# Patient Record
Sex: FEMALE | Race: WHITE | Employment: PART TIME | URBAN - METROPOLITAN AREA
[De-identification: names, ages, dates, MRNs, and addresses within clinical notes are randomized per-mention and may not be internally consistent; named-entity substitution may affect disease eponyms.]

---

## 2017-01-12 ENCOUNTER — TELEPHONE (OUTPATIENT)
Dept: FAMILY MEDICINE CLINIC | Age: 37
End: 2017-01-12

## 2017-05-22 ENCOUNTER — TELEPHONE (OUTPATIENT)
Dept: FAMILY MEDICINE CLINIC | Age: 37
End: 2017-05-22

## 2017-05-31 ENCOUNTER — OFFICE VISIT (OUTPATIENT)
Dept: FAMILY MEDICINE CLINIC | Age: 37
End: 2017-05-31

## 2017-05-31 VITALS
SYSTOLIC BLOOD PRESSURE: 124 MMHG | OXYGEN SATURATION: 98 % | WEIGHT: 293 LBS | HEART RATE: 72 BPM | BODY MASS INDEX: 53.63 KG/M2 | DIASTOLIC BLOOD PRESSURE: 82 MMHG

## 2017-05-31 DIAGNOSIS — E66.01 OBESITY, MORBID, BMI 50 OR HIGHER (HCC): ICD-10-CM

## 2017-05-31 DIAGNOSIS — M25.552 LEFT HIP PAIN: Primary | ICD-10-CM

## 2017-05-31 PROCEDURE — 96160 PT-FOCUSED HLTH RISK ASSMT: CPT | Performed by: FAMILY MEDICINE

## 2017-05-31 PROCEDURE — 96372 THER/PROPH/DIAG INJ SC/IM: CPT | Performed by: FAMILY MEDICINE

## 2017-05-31 PROCEDURE — 99213 OFFICE O/P EST LOW 20 MIN: CPT | Performed by: FAMILY MEDICINE

## 2017-05-31 RX ORDER — ACETAMINOPHEN AND CODEINE PHOSPHATE 300; 30 MG/1; MG/1
1 TABLET ORAL EVERY 6 HOURS PRN
Qty: 30 TABLET | Refills: 0 | Status: SHIPPED | OUTPATIENT
Start: 2017-05-31 | End: 2017-10-16

## 2017-05-31 RX ORDER — PHENTERMINE HYDROCHLORIDE 37.5 MG/1
37.5 TABLET ORAL
Qty: 30 TABLET | Refills: 0 | Status: SHIPPED | OUTPATIENT
Start: 2017-05-31 | End: 2017-06-30

## 2017-05-31 RX ORDER — METHOCARBAMOL 750 MG/1
750 TABLET, FILM COATED ORAL 2 TIMES DAILY
Qty: 30 TABLET | Refills: 0 | Status: SHIPPED | OUTPATIENT
Start: 2017-05-31 | End: 2017-06-15

## 2017-05-31 ASSESSMENT — PATIENT HEALTH QUESTIONNAIRE - PHQ9
SUM OF ALL RESPONSES TO PHQ9 QUESTIONS 1 & 2: 2
8. MOVING OR SPEAKING SO SLOWLY THAT OTHER PEOPLE COULD HAVE NOTICED. OR THE OPPOSITE, BEING SO FIGETY OR RESTLESS THAT YOU HAVE BEEN MOVING AROUND A LOT MORE THAN USUAL: 0
4. FEELING TIRED OR HAVING LITTLE ENERGY: 1
2. FEELING DOWN, DEPRESSED OR HOPELESS: 1
5. POOR APPETITE OR OVEREATING: 1
3. TROUBLE FALLING OR STAYING ASLEEP: 0
6. FEELING BAD ABOUT YOURSELF - OR THAT YOU ARE A FAILURE OR HAVE LET YOURSELF OR YOUR FAMILY DOWN: 1
9. THOUGHTS THAT YOU WOULD BE BETTER OFF DEAD, OR OF HURTING YOURSELF: 0
SUM OF ALL RESPONSES TO PHQ QUESTIONS 1-9: 5
1. LITTLE INTEREST OR PLEASURE IN DOING THINGS: 1
10. IF YOU CHECKED OFF ANY PROBLEMS, HOW DIFFICULT HAVE THESE PROBLEMS MADE IT FOR YOU TO DO YOUR WORK, TAKE CARE OF THINGS AT HOME, OR GET ALONG WITH OTHER PEOPLE: 0
7. TROUBLE CONCENTRATING ON THINGS, SUCH AS READING THE NEWSPAPER OR WATCHING TELEVISION: 0

## 2017-06-12 ENCOUNTER — TELEPHONE (OUTPATIENT)
Dept: FAMILY MEDICINE CLINIC | Age: 37
End: 2017-06-12

## 2017-06-12 RX ORDER — MELOXICAM 15 MG/1
15 TABLET ORAL DAILY
Qty: 30 TABLET | Refills: 1 | Status: SHIPPED | OUTPATIENT
Start: 2017-06-12 | End: 2017-12-15 | Stop reason: SDUPTHER

## 2017-06-12 ASSESSMENT — ENCOUNTER SYMPTOMS
COUGH: 0
SHORTNESS OF BREATH: 0

## 2017-06-29 ENCOUNTER — OFFICE VISIT (OUTPATIENT)
Dept: FAMILY MEDICINE CLINIC | Age: 37
End: 2017-06-29

## 2017-06-29 VITALS
DIASTOLIC BLOOD PRESSURE: 85 MMHG | OXYGEN SATURATION: 98 % | WEIGHT: 293 LBS | SYSTOLIC BLOOD PRESSURE: 125 MMHG | BODY MASS INDEX: 53.22 KG/M2 | HEART RATE: 94 BPM

## 2017-06-29 DIAGNOSIS — E66.01 OBESITY, MORBID, BMI 50 OR HIGHER (HCC): ICD-10-CM

## 2017-06-29 DIAGNOSIS — F41.9 ANXIETY: Primary | ICD-10-CM

## 2017-06-29 PROCEDURE — 99213 OFFICE O/P EST LOW 20 MIN: CPT | Performed by: FAMILY MEDICINE

## 2017-07-03 ENCOUNTER — TELEPHONE (OUTPATIENT)
Dept: BARIATRICS/WEIGHT MGMT | Age: 37
End: 2017-07-03

## 2017-07-09 ASSESSMENT — ENCOUNTER SYMPTOMS
COUGH: 0
SHORTNESS OF BREATH: 0

## 2017-07-13 ENCOUNTER — TELEPHONE (OUTPATIENT)
Dept: BARIATRICS/WEIGHT MGMT | Age: 37
End: 2017-07-13

## 2017-10-16 ENCOUNTER — OFFICE VISIT (OUTPATIENT)
Dept: FAMILY MEDICINE CLINIC | Age: 37
End: 2017-10-16

## 2017-10-16 VITALS
WEIGHT: 293 LBS | OXYGEN SATURATION: 97 % | HEART RATE: 67 BPM | DIASTOLIC BLOOD PRESSURE: 84 MMHG | SYSTOLIC BLOOD PRESSURE: 127 MMHG | BODY MASS INDEX: 52.84 KG/M2

## 2017-10-16 DIAGNOSIS — F41.9 ANXIETY: Primary | ICD-10-CM

## 2017-10-16 DIAGNOSIS — M25.562 CHRONIC PAIN OF BOTH KNEES: ICD-10-CM

## 2017-10-16 DIAGNOSIS — Z23 NEEDS FLU SHOT: ICD-10-CM

## 2017-10-16 DIAGNOSIS — G89.29 CHRONIC PAIN OF BOTH KNEES: ICD-10-CM

## 2017-10-16 DIAGNOSIS — M25.561 CHRONIC PAIN OF BOTH KNEES: ICD-10-CM

## 2017-10-16 PROCEDURE — 99213 OFFICE O/P EST LOW 20 MIN: CPT | Performed by: FAMILY MEDICINE

## 2017-10-16 PROCEDURE — G8417 CALC BMI ABV UP PARAM F/U: HCPCS | Performed by: FAMILY MEDICINE

## 2017-10-16 PROCEDURE — G8484 FLU IMMUNIZE NO ADMIN: HCPCS | Performed by: FAMILY MEDICINE

## 2017-10-16 PROCEDURE — 90471 IMMUNIZATION ADMIN: CPT | Performed by: FAMILY MEDICINE

## 2017-10-16 PROCEDURE — 90688 IIV4 VACCINE SPLT 0.5 ML IM: CPT | Performed by: FAMILY MEDICINE

## 2017-10-16 PROCEDURE — G8427 DOCREV CUR MEDS BY ELIG CLIN: HCPCS | Performed by: FAMILY MEDICINE

## 2017-10-16 PROCEDURE — 1036F TOBACCO NON-USER: CPT | Performed by: FAMILY MEDICINE

## 2017-10-16 NOTE — PROGRESS NOTES
Antonietta Steele  1980  10/21/17    Chief Complaint   Patient presents with    3 Month Follow-Up           Patient here for 3 months f/u anxiety, and chronic knees pain, and anemia, doing fine and has no new concerns. History reviewed. No pertinent past medical history. Past Surgical History:   Procedure Laterality Date     SECTION       History reviewed. No pertinent family history. Social History     Social History    Marital status: Single     Spouse name: N/A    Number of children: N/A    Years of education: N/A     Occupational History    Not on file. Social History Main Topics    Smoking status: Never Smoker    Smokeless tobacco: Never Used    Alcohol use No    Drug use: No    Sexual activity: Yes     Partners: Male     Other Topics Concern    Not on file     Social History Narrative    No narrative on file       Allergies   Allergen Reactions    Fentanyl Itching     Current Outpatient Prescriptions   Medication Sig Dispense Refill    meloxicam (MOBIC) 15 MG tablet Take 1 tablet by mouth daily 30 tablet 1    naproxen (NAPROSYN) 500 MG tablet Take 1 tablet by mouth 2 times daily (with meals) 60 tablet 1    citalopram (CELEXA) 40 MG tablet TAKE ONE TABLET BY MOUTH ONCE DAILY 30 tablet 5    Cholecalciferol (VITAMIN D) 2000 UNITS TABS tablet Take 1 tablet by mouth daily 30 tablet 5    ferrous sulfate (RIAZ-ARAMIS) 325 (65 FE) MG tablet Take 1 tablet by mouth daily 30 tablet 5    docosanol (ABREVA) 10 % CREA cream Apply topically 5 times daily 1 Tube 0     No current facility-administered medications for this visit. Review of Systems   Constitutional: Negative for activity change, chills and fatigue. Respiratory: Negative for cough and shortness of breath. Cardiovascular: Negative for chest pain and leg swelling. Psychiatric/Behavioral: Negative for agitation. The patient is not nervous/anxious.         /84 (Site: Left Arm, Position: Sitting, Cuff Size:

## 2017-10-21 ASSESSMENT — ENCOUNTER SYMPTOMS
SHORTNESS OF BREATH: 0
COUGH: 0

## 2017-12-15 ENCOUNTER — OFFICE VISIT (OUTPATIENT)
Dept: FAMILY MEDICINE CLINIC | Age: 37
End: 2017-12-15

## 2017-12-15 VITALS
SYSTOLIC BLOOD PRESSURE: 116 MMHG | BODY MASS INDEX: 52.37 KG/M2 | WEIGHT: 293 LBS | DIASTOLIC BLOOD PRESSURE: 83 MMHG | OXYGEN SATURATION: 96 % | HEART RATE: 70 BPM

## 2017-12-15 DIAGNOSIS — M25.552 LEFT HIP PAIN: ICD-10-CM

## 2017-12-15 PROCEDURE — G8417 CALC BMI ABV UP PARAM F/U: HCPCS | Performed by: FAMILY MEDICINE

## 2017-12-15 PROCEDURE — 99213 OFFICE O/P EST LOW 20 MIN: CPT | Performed by: FAMILY MEDICINE

## 2017-12-15 PROCEDURE — G8427 DOCREV CUR MEDS BY ELIG CLIN: HCPCS | Performed by: FAMILY MEDICINE

## 2017-12-15 PROCEDURE — 1036F TOBACCO NON-USER: CPT | Performed by: FAMILY MEDICINE

## 2017-12-15 PROCEDURE — G8484 FLU IMMUNIZE NO ADMIN: HCPCS | Performed by: FAMILY MEDICINE

## 2017-12-15 RX ORDER — NAPROXEN 500 MG/1
500 TABLET ORAL 2 TIMES DAILY WITH MEALS
Qty: 60 TABLET | Refills: 5 | Status: SHIPPED | OUTPATIENT
Start: 2017-12-15 | End: 2018-01-16

## 2017-12-15 RX ORDER — ACETAMINOPHEN AND CODEINE PHOSPHATE 300; 30 MG/1; MG/1
1 TABLET ORAL EVERY 6 HOURS PRN
Qty: 30 TABLET | Refills: 0 | Status: SHIPPED | OUTPATIENT
Start: 2017-12-15 | End: 2018-04-25 | Stop reason: ALTCHOICE

## 2017-12-15 RX ORDER — MELOXICAM 15 MG/1
15 TABLET ORAL DAILY
Qty: 30 TABLET | Refills: 5 | Status: SHIPPED | OUTPATIENT
Start: 2017-12-15 | End: 2019-12-13 | Stop reason: SDUPTHER

## 2017-12-15 NOTE — PATIENT INSTRUCTIONS
Patient Education        Patellofemoral Pain Syndrome (Runner's Knee): Exercises  Your Care Instructions  Here are some examples of typical rehabilitation exercises for your condition. Start each exercise slowly. Ease off the exercise if you start to have pain. Your doctor or physical therapist will tell you when you can start these exercises and which ones will work best for you. How to do the exercises  Calf wall stretch    1. Stand facing a wall with your hands on the wall at about eye level. Put your affected leg about a step behind your other leg. 2. Keeping your back leg straight and your back heel on the floor, bend your front knee and gently bring your hip and chest toward the wall until you feel a stretch in the calf of your back leg. 3. Hold the stretch for at least 15 to 30 seconds. 4. Repeat 2 to 4 times. 5. Repeat steps 1 through 4, but this time keep your back knee bent. Quadriceps stretch    1. If you are not steady on your feet, hold on to a chair, counter, or wall. 2. Bend your affected leg, and reach behind you to grab the front of your foot or ankle with the hand on the same side. For example, if you are stretching your right leg, use your right hand. 3. Keeping your knees next to each other, pull your foot toward your buttock until you feel a gentle stretch across the front of your hip and down the front of your thigh. Your knee should be pointed directly to the ground, and not out to the side. 4. Hold the stretch for at least 15 to 30 seconds. 5. Repeat 2 to 4 times. Hamstring wall stretch    1. Lie on your back in a doorway, with your good leg through the open door. 2. Slide your affected leg up the wall to straighten your knee. You should feel a gentle stretch down the back of your leg. 1. Do not arch your back. 2. Do not bend either knee. 3. Keep one heel touching the floor and the other heel touching the wall. Do not point your toes.   3. Hold the stretch for at least 1 minute. Then over time, try to lengthen the time you hold the stretch to as long as 6 minutes. 4. Repeat 2 to 4 times. 5. If you do not have a place to do this exercise in a doorway, there is another way to do it:  6. Lie on your back, and bend your affected leg. 7. Loop a towel under the ball and toes of that foot, and hold the ends of the towel in your hands. 8. Straighten your knee, and slowly pull back on the towel. You should feel a gentle stretch down the back of your leg. 9. Hold the stretch for at least 15 to 30 seconds. Or even better, hold the stretch for 1 minute if you can. 10. Repeat 2 to 4 times. Quad sets    1. Sit with your affected leg straight and supported on the floor or a firm bed. Place a small, rolled-up towel under your affected knee. Your other leg should be bent, with that foot flat on the floor. 2. Tighten the thigh muscles of your affected leg by pressing the back of your knee down into the towel. 3. Hold for about 6 seconds, then rest for up to 10 seconds. 4. Repeat 8 to 12 times. Straight-leg raises to the front    1. Lie on your back with your good knee bent so that your foot rests flat on the floor. Your affected leg should be straight. Make sure that your low back has a normal curve. You should be able to slip your hand in between the floor and the small of your back, with your palm touching the floor and your back touching the back of your hand. 2. Tighten the thigh muscles in your affected leg by pressing the back of your knee flat down to the floor. Hold your knee straight. 3. Keeping the thigh muscles tight and your leg straight, lift your affected leg up so that your heel is about 12 inches off the floor. 4. Hold for about 6 seconds, then lower your leg slowly. Rest for up to 10 seconds between repetitions. 5. Repeat 8 to 12 times. Straight-leg raises to the back    1. Lie on your stomach, and lift your leg straight up behind you (toward the ceiling).   2. Lift your toes about 6 inches off the floor, hold for about 6 seconds, then lower slowly. 3. Do 8 to 12 repetitions. Wall slide with ball squeeze    1. Stand with your back against a wall and with your feet about shoulder-width apart. Your feet should be about 12 inches away from the wall. 2. Put a ball about the size of a soccer ball between your knees. Then slowly slide down the wall until your knees are bent about 20 to 30 degrees. 3. Tighten your thigh muscles by squeezing the ball between your knees. Hold that position for about 10 seconds, then stop squeezing. Rest for up to 10 seconds between repetitions. 4. Repeat 8 to 12 times. Follow-up care is a key part of your treatment and safety. Be sure to make and go to all appointments, and call your doctor if you are having problems. It's also a good idea to know your test results and keep a list of the medicines you take. Where can you learn more? Go to https://Scoot Networkspeyepme.com.[employer]. org and sign in to your [employer] account. Enter A404 in the V[employer]Venta box to learn more about \"Patellofemoral Pain Syndrome (Runner's Knee): Exercises. \"     If you do not have an account, please click on the \"Sign Up Now\" link. Current as of: March 21, 2017  Content Version: 11.4  © 2436-7153 Health[employer], Incorporated. Care instructions adapted under license by [facility] ([facility]). If you have questions about a medical condition or this instruction, always ask your healthcare professional. [place] [address] any warranty or liability for your use of this information.

## 2017-12-16 ASSESSMENT — ENCOUNTER SYMPTOMS
SHORTNESS OF BREATH: 0
COUGH: 0

## 2017-12-16 NOTE — PROGRESS NOTES
Osito Arnett  16    Chief Complaint   Patient presents with    Knee Pain     x 7 days           Patient here c/o:      Knee Pain    There was no injury mechanism. Pain location: flare up left knee X 7 days. The quality of the pain is described as aching. The pain is at a severity of 6/10. The pain is moderate. The pain has been fluctuating since onset. Pertinent negatives include no inability to bear weight, loss of motion, loss of sensation, muscle weakness, numbness or tingling. Nothing aggravates the symptoms. She has tried acetaminophen and NSAIDs for the symptoms. The treatment provided mild relief. History reviewed. No pertinent past medical history. Past Surgical History:   Procedure Laterality Date     SECTION       History reviewed. No pertinent family history. Social History     Social History    Marital status: Single     Spouse name: N/A    Number of children: N/A    Years of education: N/A     Occupational History    Not on file. Social History Main Topics    Smoking status: Never Smoker    Smokeless tobacco: Never Used    Alcohol use No    Drug use: No    Sexual activity: Yes     Partners: Male     Other Topics Concern    Not on file     Social History Narrative    No narrative on file       Allergies   Allergen Reactions    Fentanyl Itching     Current Outpatient Prescriptions   Medication Sig Dispense Refill    naproxen (NAPROSYN) 500 MG tablet Take 1 tablet by mouth 2 times daily (with meals) 60 tablet 5    meloxicam (MOBIC) 15 MG tablet Take 1 tablet by mouth daily 30 tablet 5    acetaminophen-codeine (TYLENOL #3) 300-30 MG per tablet Take 1 tablet by mouth every 6 hours as needed for Pain .  30 tablet 0    citalopram (CELEXA) 40 MG tablet TAKE ONE TABLET BY MOUTH ONCE DAILY 30 tablet 5    Cholecalciferol (VITAMIN D) 2000 UNITS TABS tablet Take 1 tablet by mouth daily 30 tablet 5    ferrous sulfate (RIAZ-ARAMIS) 325 (65 FE) MG tablet Take 1

## 2017-12-18 DIAGNOSIS — M25.552 LEFT HIP PAIN: ICD-10-CM

## 2017-12-18 RX ORDER — ACETAMINOPHEN AND CODEINE PHOSPHATE 300; 30 MG/1; MG/1
1 TABLET ORAL EVERY 6 HOURS PRN
Qty: 30 TABLET | Refills: 0 | OUTPATIENT
Start: 2017-12-18

## 2018-01-16 ENCOUNTER — OFFICE VISIT (OUTPATIENT)
Dept: FAMILY MEDICINE CLINIC | Age: 38
End: 2018-01-16

## 2018-01-16 VITALS
HEIGHT: 67 IN | OXYGEN SATURATION: 97 % | DIASTOLIC BLOOD PRESSURE: 85 MMHG | HEART RATE: 67 BPM | BODY MASS INDEX: 45.99 KG/M2 | WEIGHT: 293 LBS | SYSTOLIC BLOOD PRESSURE: 124 MMHG

## 2018-01-16 DIAGNOSIS — F41.9 ANXIETY: Primary | ICD-10-CM

## 2018-01-16 DIAGNOSIS — M79.605 PAIN IN BOTH LOWER EXTREMITIES: ICD-10-CM

## 2018-01-16 DIAGNOSIS — M79.604 PAIN IN BOTH LOWER EXTREMITIES: ICD-10-CM

## 2018-01-16 DIAGNOSIS — D50.9 IRON DEFICIENCY ANEMIA, UNSPECIFIED IRON DEFICIENCY ANEMIA TYPE: ICD-10-CM

## 2018-01-16 DIAGNOSIS — D50.8 OTHER IRON DEFICIENCY ANEMIA: ICD-10-CM

## 2018-01-16 DIAGNOSIS — E55.9 VITAMIN D DEFICIENCY: ICD-10-CM

## 2018-01-16 LAB
A/G RATIO: 1.8 (ref 1.1–2.2)
ALBUMIN SERPL-MCNC: 4.2 G/DL (ref 3.4–5)
ALP BLD-CCNC: 67 U/L (ref 40–129)
ALT SERPL-CCNC: 14 U/L (ref 10–40)
ANION GAP SERPL CALCULATED.3IONS-SCNC: 11 MMOL/L (ref 3–16)
AST SERPL-CCNC: 14 U/L (ref 15–37)
BASOPHILS ABSOLUTE: 0.1 K/UL (ref 0–0.2)
BASOPHILS RELATIVE PERCENT: 0.7 %
BILIRUB SERPL-MCNC: 0.3 MG/DL (ref 0–1)
BUN BLDV-MCNC: 15 MG/DL (ref 7–20)
CALCIUM SERPL-MCNC: 8.8 MG/DL (ref 8.3–10.6)
CHLORIDE BLD-SCNC: 104 MMOL/L (ref 99–110)
CO2: 26 MMOL/L (ref 21–32)
CREAT SERPL-MCNC: 0.6 MG/DL (ref 0.6–1.1)
EOSINOPHILS ABSOLUTE: 0.1 K/UL (ref 0–0.6)
EOSINOPHILS RELATIVE PERCENT: 1 %
FOLATE: 13.57 NG/ML (ref 4.78–24.2)
GFR AFRICAN AMERICAN: >60
GFR NON-AFRICAN AMERICAN: >60
GLOBULIN: 2.3 G/DL
GLUCOSE BLD-MCNC: 81 MG/DL (ref 70–99)
HCT VFR BLD CALC: 36.7 % (ref 36–48)
HEMOGLOBIN: 12.2 G/DL (ref 12–16)
LYMPHOCYTES ABSOLUTE: 2.2 K/UL (ref 1–5.1)
LYMPHOCYTES RELATIVE PERCENT: 28.3 %
MCH RBC QN AUTO: 27.5 PG (ref 26–34)
MCHC RBC AUTO-ENTMCNC: 33.3 G/DL (ref 31–36)
MCV RBC AUTO: 82.8 FL (ref 80–100)
MONOCYTES ABSOLUTE: 0.5 K/UL (ref 0–1.3)
MONOCYTES RELATIVE PERCENT: 6.2 %
NEUTROPHILS ABSOLUTE: 4.9 K/UL (ref 1.7–7.7)
NEUTROPHILS RELATIVE PERCENT: 63.8 %
PDW BLD-RTO: 13.5 % (ref 12.4–15.4)
PLATELET # BLD: 223 K/UL (ref 135–450)
PMV BLD AUTO: 8.3 FL (ref 5–10.5)
POTASSIUM SERPL-SCNC: 4.5 MMOL/L (ref 3.5–5.1)
RBC # BLD: 4.43 M/UL (ref 4–5.2)
SODIUM BLD-SCNC: 141 MMOL/L (ref 136–145)
TOTAL PROTEIN: 6.5 G/DL (ref 6.4–8.2)
VITAMIN B-12: 522 PG/ML (ref 211–911)
VITAMIN D 25-HYDROXY: 18.1 NG/ML
WBC # BLD: 7.7 K/UL (ref 4–11)

## 2018-01-16 PROCEDURE — G8417 CALC BMI ABV UP PARAM F/U: HCPCS | Performed by: FAMILY MEDICINE

## 2018-01-16 PROCEDURE — 99213 OFFICE O/P EST LOW 20 MIN: CPT | Performed by: FAMILY MEDICINE

## 2018-01-16 PROCEDURE — G8484 FLU IMMUNIZE NO ADMIN: HCPCS | Performed by: FAMILY MEDICINE

## 2018-01-16 PROCEDURE — G8427 DOCREV CUR MEDS BY ELIG CLIN: HCPCS | Performed by: FAMILY MEDICINE

## 2018-01-16 PROCEDURE — 36415 COLL VENOUS BLD VENIPUNCTURE: CPT | Performed by: FAMILY MEDICINE

## 2018-01-16 PROCEDURE — 1036F TOBACCO NON-USER: CPT | Performed by: FAMILY MEDICINE

## 2018-01-16 RX ORDER — PNV NO.95/FERROUS FUM/FOLIC AC 28MG-0.8MG
TABLET ORAL
Qty: 30 TABLET | Refills: 5 | Status: SHIPPED | OUTPATIENT
Start: 2018-01-16 | End: 2019-12-13 | Stop reason: SDUPTHER

## 2018-01-16 RX ORDER — CHOLECALCIFEROL (VITAMIN D3) 125 MCG
CAPSULE ORAL
Qty: 30 TABLET | Refills: 5 | Status: SHIPPED | OUTPATIENT
Start: 2018-01-16 | End: 2019-12-13 | Stop reason: SDUPTHER

## 2018-01-16 RX ORDER — SERTRALINE HYDROCHLORIDE 25 MG/1
25 TABLET, FILM COATED ORAL DAILY
Qty: 30 TABLET | Refills: 3 | Status: SHIPPED | OUTPATIENT
Start: 2018-01-16 | End: 2018-03-06 | Stop reason: SDUPTHER

## 2018-01-16 ASSESSMENT — ENCOUNTER SYMPTOMS
DIARRHEA: 0
COUGH: 0
CONSTIPATION: 0
SINUS PAIN: 0
SHORTNESS OF BREATH: 0

## 2018-01-16 NOTE — PROGRESS NOTES
Leia Phillipsd  17    Chief Complaint   Patient presents with    Anxiety     3 month    Leg Pain           Patient here for 3 months f/u regarding anxiety, patient feels the celexa not working any more, patient is not happy, feeling sad, patient also c/o: leg pain, both leg, going on for 2 months, burning sensation only when shave her legs. Denies fever or swelling. History reviewed. No pertinent past medical history. Past Surgical History:   Procedure Laterality Date     SECTION       History reviewed. No pertinent family history. Social History     Social History    Marital status: Single     Spouse name: N/A    Number of children: N/A    Years of education: N/A     Occupational History    Not on file. Social History Main Topics    Smoking status: Never Smoker    Smokeless tobacco: Never Used    Alcohol use No    Drug use: No    Sexual activity: Yes     Partners: Male     Other Topics Concern    Not on file     Social History Narrative    No narrative on file       Allergies   Allergen Reactions    Fentanyl Itching     Current Outpatient Prescriptions   Medication Sig Dispense Refill    sertraline (ZOLOFT) 25 MG tablet Take 1 tablet by mouth daily 30 tablet 3    meloxicam (MOBIC) 15 MG tablet Take 1 tablet by mouth daily 30 tablet 5    Cholecalciferol (VITAMIN D) 2000 UNITS TABS tablet Take 1 tablet by mouth daily 30 tablet 5    ferrous sulfate (RIAZ-ARAMIS) 325 (65 FE) MG tablet Take 1 tablet by mouth daily 30 tablet 5    docosanol (ABREVA) 10 % CREA cream Apply topically 5 times daily 1 Tube 0    acetaminophen-codeine (TYLENOL #3) 300-30 MG per tablet Take 1 tablet by mouth every 6 hours as needed for Pain . 30 tablet 0     No current facility-administered medications for this visit. Review of Systems   Constitutional: Negative for activity change, chills and fatigue. HENT: Negative for congestion, nosebleeds, sinus pain and sneezing. jeff su. - Questions answered and patient verbalizes understanding.  - Call for any problem, questions, or concerns. Return in about 1 month (around 2/16/2018).

## 2018-01-17 DIAGNOSIS — E55.9 VITAMIN D DEFICIENCY: ICD-10-CM

## 2018-01-17 RX ORDER — ERGOCALCIFEROL (VITAMIN D2) 1250 MCG
50000 CAPSULE ORAL WEEKLY
Qty: 4 CAPSULE | Refills: 0 | Status: SHIPPED | OUTPATIENT
Start: 2018-01-17 | End: 2018-03-06

## 2018-02-02 ENCOUNTER — OFFICE VISIT (OUTPATIENT)
Dept: FAMILY MEDICINE CLINIC | Age: 38
End: 2018-02-02

## 2018-02-02 VITALS
OXYGEN SATURATION: 98 % | WEIGHT: 293 LBS | SYSTOLIC BLOOD PRESSURE: 114 MMHG | BODY MASS INDEX: 52.94 KG/M2 | HEART RATE: 71 BPM | DIASTOLIC BLOOD PRESSURE: 85 MMHG

## 2018-02-02 DIAGNOSIS — M62.838 MUSCLE SPASM OF LEFT SHOULDER AREA: ICD-10-CM

## 2018-02-02 DIAGNOSIS — M25.512 ACUTE PAIN OF LEFT SHOULDER: Primary | ICD-10-CM

## 2018-02-02 PROCEDURE — 99213 OFFICE O/P EST LOW 20 MIN: CPT | Performed by: NURSE PRACTITIONER

## 2018-02-02 RX ORDER — CYCLOBENZAPRINE HCL 10 MG
10 TABLET ORAL 3 TIMES DAILY PRN
Qty: 21 TABLET | Refills: 0 | Status: SHIPPED | OUTPATIENT
Start: 2018-02-02 | End: 2018-02-19 | Stop reason: SDUPTHER

## 2018-02-02 ASSESSMENT — ENCOUNTER SYMPTOMS
CHOKING: 0
NAUSEA: 0
DIARRHEA: 0
VOMITING: 0
SHORTNESS OF BREATH: 0
CHEST TIGHTNESS: 0
WHEEZING: 0

## 2018-02-02 NOTE — PATIENT INSTRUCTIONS
Patient Education        Shoulder Pain: Care Instructions  Your Care Instructions    You can hurt your shoulder by using it too much during an activity, such as fishing or baseball. It can also happen as part of the everyday wear and tear of getting older. Shoulder injuries can be slow to heal, but your shoulder should get better with time. Your doctor may recommend a sling to rest your shoulder. If you have injured your shoulder, you may need testing and treatment. Follow-up care is a key part of your treatment and safety. Be sure to make and go to all appointments, and call your doctor if you are having problems. It's also a good idea to know your test results and keep a list of the medicines you take. How can you care for yourself at home? · Take pain medicines exactly as directed. ¨ If the doctor gave you a prescription medicine for pain, take it as prescribed. ¨ If you are not taking a prescription pain medicine, ask your doctor if you can take an over-the-counter medicine. ¨ Do not take two or more pain medicines at the same time unless the doctor told you to. Many pain medicines contain acetaminophen, which is Tylenol. Too much acetaminophen (Tylenol) can be harmful. · If your doctor recommends that you wear a sling, use it as directed. Do not take it off before your doctor tells you to. · Put ice or a cold pack on the sore area for 10 to 20 minutes at a time. Put a thin cloth between the ice and your skin. · If there is no swelling, you can put moist heat, a heating pad, or a warm cloth on your shoulder. Some doctors suggest alternating between hot and cold. · Rest your shoulder for a few days. If your doctor recommends it, you can then begin gentle exercise of the shoulder, but do not lift anything heavy. When should you call for help? Call 911 anytime you think you may need emergency care. For example, call if:  ? · You have chest pain or pressure.  This may occur and no health information about you is shared. We are eager to improve for you and we are counting on your feedback to help make that happen.

## 2018-02-07 DIAGNOSIS — B00.2 ORAL HERPES SIMPLEX INFECTION: ICD-10-CM

## 2018-02-07 RX ORDER — ACYCLOVIR 400 MG/1
TABLET ORAL
Qty: 60 TABLET | Refills: 2 | Status: ON HOLD | OUTPATIENT
Start: 2018-02-07 | End: 2021-08-15

## 2018-02-19 ENCOUNTER — TELEPHONE (OUTPATIENT)
Dept: FAMILY MEDICINE CLINIC | Age: 38
End: 2018-02-19

## 2018-02-19 DIAGNOSIS — M62.838 MUSCLE SPASM OF LEFT SHOULDER AREA: ICD-10-CM

## 2018-02-19 RX ORDER — CYCLOBENZAPRINE HCL 10 MG
10 TABLET ORAL 2 TIMES DAILY PRN
Qty: 60 TABLET | Refills: 1 | Status: SHIPPED | OUTPATIENT
Start: 2018-02-19 | End: 2018-03-21

## 2018-02-19 NOTE — TELEPHONE ENCOUNTER
Patient was seen on 2/2/2018 by Nayana Parra and was given Flexeril for the muscle spasm and would like to know if she is to keep taking the Flexeril if so she needs a new script.  Please advise

## 2018-03-06 ENCOUNTER — OFFICE VISIT (OUTPATIENT)
Dept: FAMILY MEDICINE CLINIC | Age: 38
End: 2018-03-06

## 2018-03-06 VITALS
BODY MASS INDEX: 54.5 KG/M2 | DIASTOLIC BLOOD PRESSURE: 70 MMHG | RESPIRATION RATE: 18 BRPM | WEIGHT: 293 LBS | SYSTOLIC BLOOD PRESSURE: 120 MMHG | HEART RATE: 80 BPM

## 2018-03-06 DIAGNOSIS — M79.89 CYST OF SOFT TISSUE: ICD-10-CM

## 2018-03-06 DIAGNOSIS — F41.9 ANXIETY: Primary | ICD-10-CM

## 2018-03-06 PROCEDURE — 1036F TOBACCO NON-USER: CPT | Performed by: FAMILY MEDICINE

## 2018-03-06 PROCEDURE — G8417 CALC BMI ABV UP PARAM F/U: HCPCS | Performed by: FAMILY MEDICINE

## 2018-03-06 PROCEDURE — G8482 FLU IMMUNIZE ORDER/ADMIN: HCPCS | Performed by: FAMILY MEDICINE

## 2018-03-06 PROCEDURE — G8427 DOCREV CUR MEDS BY ELIG CLIN: HCPCS | Performed by: FAMILY MEDICINE

## 2018-03-06 PROCEDURE — 99213 OFFICE O/P EST LOW 20 MIN: CPT | Performed by: FAMILY MEDICINE

## 2018-03-13 ASSESSMENT — ENCOUNTER SYMPTOMS
DIARRHEA: 0
COUGH: 0
SHORTNESS OF BREATH: 0
CONSTIPATION: 0

## 2018-04-23 ENCOUNTER — TELEPHONE (OUTPATIENT)
Dept: FAMILY MEDICINE CLINIC | Age: 38
End: 2018-04-23

## 2018-04-23 DIAGNOSIS — G89.29 CHRONIC PAIN OF LEFT KNEE: Primary | ICD-10-CM

## 2018-04-23 DIAGNOSIS — M25.562 CHRONIC PAIN OF LEFT KNEE: Primary | ICD-10-CM

## 2018-04-24 RX ORDER — HYDROCODONE BITARTRATE AND ACETAMINOPHEN 5; 325 MG/1; MG/1
1 TABLET ORAL EVERY 8 HOURS PRN
Qty: 30 TABLET | Refills: 0 | Status: SHIPPED | OUTPATIENT
Start: 2018-04-24 | End: 2022-05-16 | Stop reason: SDUPTHER

## 2018-04-25 ENCOUNTER — OFFICE VISIT (OUTPATIENT)
Dept: FAMILY MEDICINE CLINIC | Age: 38
End: 2018-04-25

## 2018-04-25 VITALS
DIASTOLIC BLOOD PRESSURE: 88 MMHG | SYSTOLIC BLOOD PRESSURE: 128 MMHG | WEIGHT: 293 LBS | OXYGEN SATURATION: 98 % | HEART RATE: 77 BPM | RESPIRATION RATE: 24 BRPM | BODY MASS INDEX: 53.38 KG/M2

## 2018-04-25 DIAGNOSIS — M25.562 CHRONIC PAIN OF LEFT KNEE: Primary | ICD-10-CM

## 2018-04-25 DIAGNOSIS — J01.00 ACUTE NON-RECURRENT MAXILLARY SINUSITIS: ICD-10-CM

## 2018-04-25 DIAGNOSIS — G89.29 CHRONIC PAIN OF LEFT KNEE: Primary | ICD-10-CM

## 2018-04-25 PROCEDURE — 1036F TOBACCO NON-USER: CPT | Performed by: FAMILY MEDICINE

## 2018-04-25 PROCEDURE — G8427 DOCREV CUR MEDS BY ELIG CLIN: HCPCS | Performed by: FAMILY MEDICINE

## 2018-04-25 PROCEDURE — 96372 THER/PROPH/DIAG INJ SC/IM: CPT | Performed by: FAMILY MEDICINE

## 2018-04-25 PROCEDURE — G8417 CALC BMI ABV UP PARAM F/U: HCPCS | Performed by: FAMILY MEDICINE

## 2018-04-25 PROCEDURE — 99214 OFFICE O/P EST MOD 30 MIN: CPT | Performed by: FAMILY MEDICINE

## 2018-04-25 RX ORDER — HYDROCODONE BITARTRATE AND ACETAMINOPHEN 5; 325 MG/1; MG/1
1 TABLET ORAL EVERY 6 HOURS PRN
Qty: 10 TABLET | Refills: 0 | Status: SHIPPED | OUTPATIENT
Start: 2018-04-25 | End: 2018-04-28

## 2018-04-25 RX ORDER — AZITHROMYCIN 250 MG/1
TABLET, FILM COATED ORAL
Qty: 1 PACKET | Refills: 0 | Status: SHIPPED | OUTPATIENT
Start: 2018-04-25 | End: 2018-04-29

## 2018-04-25 ASSESSMENT — ENCOUNTER SYMPTOMS
TROUBLE SWALLOWING: 0
SINUS PRESSURE: 0
HEMOPTYSIS: 0
SINUS PAIN: 0
RHINORRHEA: 0
WHEEZING: 0
SORE THROAT: 0
COUGH: 1
SHORTNESS OF BREATH: 0

## 2018-04-30 ENCOUNTER — TELEPHONE (OUTPATIENT)
Dept: FAMILY MEDICINE CLINIC | Age: 38
End: 2018-04-30

## 2018-04-30 DIAGNOSIS — R05.9 COUGH: Primary | ICD-10-CM

## 2018-05-03 ENCOUNTER — HOSPITAL ENCOUNTER (OUTPATIENT)
Dept: GENERAL RADIOLOGY | Age: 38
Discharge: HOME | End: 2018-05-03
Attending: FAMILY MEDICINE | Admitting: FAMILY MEDICINE

## 2018-05-03 DIAGNOSIS — R05.9 COUGH: ICD-10-CM

## 2018-05-07 ENCOUNTER — TELEPHONE (OUTPATIENT)
Dept: FAMILY MEDICINE CLINIC | Age: 38
End: 2018-05-07

## 2018-06-06 ENCOUNTER — OFFICE VISIT (OUTPATIENT)
Dept: FAMILY MEDICINE CLINIC | Age: 38
End: 2018-06-06

## 2018-06-06 VITALS
HEART RATE: 70 BPM | OXYGEN SATURATION: 95 % | DIASTOLIC BLOOD PRESSURE: 80 MMHG | SYSTOLIC BLOOD PRESSURE: 108 MMHG | HEIGHT: 67 IN | BODY MASS INDEX: 45.99 KG/M2 | WEIGHT: 293 LBS

## 2018-06-06 DIAGNOSIS — M77.8 TENDINITIS OF RIGHT WRIST: Primary | ICD-10-CM

## 2018-06-06 DIAGNOSIS — F41.9 ANXIETY: ICD-10-CM

## 2018-06-06 DIAGNOSIS — E66.01 MORBID OBESITY (HCC): ICD-10-CM

## 2018-06-06 PROCEDURE — 1036F TOBACCO NON-USER: CPT | Performed by: FAMILY MEDICINE

## 2018-06-06 PROCEDURE — G8427 DOCREV CUR MEDS BY ELIG CLIN: HCPCS | Performed by: FAMILY MEDICINE

## 2018-06-06 PROCEDURE — G8417 CALC BMI ABV UP PARAM F/U: HCPCS | Performed by: FAMILY MEDICINE

## 2018-06-06 PROCEDURE — 99213 OFFICE O/P EST LOW 20 MIN: CPT | Performed by: FAMILY MEDICINE

## 2018-06-06 ASSESSMENT — PATIENT HEALTH QUESTIONNAIRE - PHQ9
SUM OF ALL RESPONSES TO PHQ QUESTIONS 1-9: 0
2. FEELING DOWN, DEPRESSED OR HOPELESS: 0
SUM OF ALL RESPONSES TO PHQ9 QUESTIONS 1 & 2: 0

## 2018-06-10 ASSESSMENT — ENCOUNTER SYMPTOMS
SHORTNESS OF BREATH: 0
COUGH: 0

## 2018-07-27 ENCOUNTER — TELEPHONE (OUTPATIENT)
Dept: FAMILY MEDICINE CLINIC | Age: 38
End: 2018-07-27

## 2018-08-07 ENCOUNTER — OFFICE VISIT (OUTPATIENT)
Dept: FAMILY MEDICINE CLINIC | Age: 38
End: 2018-08-07

## 2018-08-07 VITALS
WEIGHT: 293 LBS | SYSTOLIC BLOOD PRESSURE: 126 MMHG | HEART RATE: 66 BPM | OXYGEN SATURATION: 98 % | DIASTOLIC BLOOD PRESSURE: 85 MMHG | BODY MASS INDEX: 52.63 KG/M2

## 2018-08-07 DIAGNOSIS — M25.571 ACUTE RIGHT ANKLE PAIN: ICD-10-CM

## 2018-08-07 DIAGNOSIS — M79.671 FOOT PAIN, RIGHT: Primary | ICD-10-CM

## 2018-08-07 PROCEDURE — G8428 CUR MEDS NOT DOCUMENT: HCPCS | Performed by: FAMILY MEDICINE

## 2018-08-07 PROCEDURE — 99213 OFFICE O/P EST LOW 20 MIN: CPT | Performed by: FAMILY MEDICINE

## 2018-08-07 PROCEDURE — G8417 CALC BMI ABV UP PARAM F/U: HCPCS | Performed by: FAMILY MEDICINE

## 2018-08-07 PROCEDURE — 1036F TOBACCO NON-USER: CPT | Performed by: FAMILY MEDICINE

## 2018-08-07 ASSESSMENT — PATIENT HEALTH QUESTIONNAIRE - PHQ9
SUM OF ALL RESPONSES TO PHQ QUESTIONS 1-9: 0
SUM OF ALL RESPONSES TO PHQ9 QUESTIONS 1 & 2: 0
1. LITTLE INTEREST OR PLEASURE IN DOING THINGS: 0
2. FEELING DOWN, DEPRESSED OR HOPELESS: 0

## 2018-08-07 NOTE — PATIENT INSTRUCTIONS
Patient Education        Foot Pain: Care Instructions  Your Care Instructions  Foot injuries that cause pain and swelling are fairly common. Almost all sports or home repair projects can cause a misstep that ends up as foot pain. Normal wear and tear, especially as you get older, also can cause foot pain. Most minor foot injuries will heal on their own, and home treatment is usually all you need to do. If you have a severe injury, you may need tests and treatment. Follow-up care is a key part of your treatment and safety. Be sure to make and go to all appointments, and call your doctor if you are having problems. It's also a good idea to know your test results and keep a list of the medicines you take. How can you care for yourself at home? · Take pain medicines exactly as directed. ¨ If the doctor gave you a prescription medicine for pain, take it as prescribed. ¨ If you are not taking a prescription pain medicine, ask your doctor if you can take an over-the-counter medicine. · Rest and protect your foot. Take a break from any activity that may cause pain. · Put ice or a cold pack on your foot for 10 to 20 minutes at a time. Put a thin cloth between the ice and your skin. · Prop up the sore foot on a pillow when you ice it or anytime you sit or lie down during the next 3 days. Try to keep it above the level of your heart. This will help reduce swelling. · Your doctor may recommend that you wrap your foot with an elastic bandage. Keep your foot wrapped for as long as your doctor advises. · If your doctor recommends crutches, use them as directed. · Wear roomy footwear. · As soon as pain and swelling end, begin gentle exercises of your foot. Your doctor can tell you which exercises will help. When should you call for help? Call 911 anytime you think you may need emergency care.  For example, call if:    · Your foot turns pale, white, blue, or cold.    Call your doctor now or seek immediate medical care if:    · You cannot move or stand on your foot.     · Your foot looks twisted or out of its normal position.     · Your foot is not stable when you step down.     · You have signs of infection, such as:  ¨ Increased pain, swelling, warmth, or redness. ¨ Red streaks leading from the sore area. ¨ Pus draining from a place on your foot. ¨ A fever.     · Your foot is numb or tingly.    Watch closely for changes in your health, and be sure to contact your doctor if:    · You do not get better as expected.     · You have bruises from an injury that last longer than 2 weeks. Where can you learn more? Go to https://TuneGOpeSHERPANDIPITY.[employer] Computer. org and sign in to your [employer] account. Enter C163 in the Collexpo box to learn more about \"Foot Pain: Care Instructions. \"     If you do not have an account, please click on the \"Sign Up Now\" link. Current as of: November 29, 2017  Content Version: 11.6  © 4655-8109 [employer]. Care instructions adapted under license by [facility] ([facility]). If you have questions about a medical condition or this instruction, always ask your healthcare professional. [place] [address] any warranty or liability for your use of this information. Patient Education        Ankle: Exercises  Your Care Instructions  Here are some examples of exercises for your ankle. Start each exercise slowly. Ease off the exercise if you start to have pain. Your doctor or physical therapist will tell you when you can start these exercises and which ones will work best for you. How to do the exercises  \"Alphabet\" exercise    1. Trace the alphabet with your toe. This helps your ankle move in all directions. Side-to-side knee swing exercise    1. Sit in a chair with your foot flat on the floor. 2. Slowly move your knee from side to side while keeping your foot pressed flat. 3. Continue this exercise for 2 to 3 minutes. Towel curl    1.  While sitting, place your foot on a towel on the floor and scrunch the towel toward you with your toes. 2. Then use your toes to push the towel away from you. 3. Make this exercise more challenging by placing a weighted object, such as a soup can, on the other end of the towel. Towel stretch    1. Sit with your legs extended and knees straight. 2. Place a towel around your foot just under the toes. 3. Hold each end of the towel in each hand, with your hands above your knees. 4. Pull back with the towel so that your foot stretches toward you. 5. Hold the position for at least 15 to 30 seconds. 6. Repeat 2 to 4 times a session, up to 5 sessions a day. Ankle eversion exercise    1. Start by sitting with your foot flat on the floor and pushing it outward against an immovable object such as the wall or heavy furniture. Hold for about 6 seconds, then relax. Repeat 8 to 12 times. 2. After you feel comfortable with this, try using rubber tubing looped around the outside of your feet for resistance. Push your foot out to the side against the tubing, and then count to 10 as you slowly bring your foot back to the middle. Repeat 8 to 12 times. Isometric opposition exercises    1. While sitting, put your feet together flat on the floor. 2. Press your injured foot inward against your other foot. Hold for about 6 seconds, and relax. Repeat 8 to 12 times. 3. Then place the heel of your other foot on top of the injured one. Push down with the top heel while trying to push up with your injured foot. Hold for about 6 seconds, and relax. Repeat 8 to 12 times. Follow-up care is a key part of your treatment and safety. Be sure to make and go to all appointments, and call your doctor if you are having problems. It's also a good idea to know your test results and keep a list of the medicines you take. Where can you learn more? Go to https://dali.[employer] Media. org and sign in to your Worldscape account.  Enter U603 in the [address] box to learn more about \"Ankle: Exercises. \"     If you do not have an account, please click on the \"Sign Up Now\" link. Current as of: November 29, 2017  Content Version: 11.6  © 3725-6708 [employer], Incorporated. Care instructions adapted under license by [facility] ([facility]). If you have questions about a medical condition or this instruction, always ask your healthcare professional. [address] any warranty or liability for your use of this information.

## 2018-09-06 ENCOUNTER — HOSPITAL ENCOUNTER (OUTPATIENT)
Dept: GENERAL RADIOLOGY | Age: 38
Discharge: HOME | End: 2018-09-06
Attending: PODIATRIST | Admitting: PODIATRIST

## 2018-09-06 DIAGNOSIS — M79.671 RIGHT FOOT PAIN: ICD-10-CM

## 2018-09-06 DIAGNOSIS — M79.672 LEFT FOOT PAIN: ICD-10-CM

## 2018-09-06 DIAGNOSIS — M25.572 LEFT ANKLE PAIN, UNSPECIFIED CHRONICITY: ICD-10-CM

## 2018-09-06 DIAGNOSIS — M25.571 RIGHT ANKLE PAIN, UNSPECIFIED CHRONICITY: ICD-10-CM

## 2019-02-16 DIAGNOSIS — M25.552 LEFT HIP PAIN: ICD-10-CM

## 2019-02-18 RX ORDER — NAPROXEN 500 MG/1
TABLET ORAL
Qty: 60 TABLET | Refills: 5 | Status: SHIPPED | OUTPATIENT
Start: 2019-02-18 | End: 2019-12-13 | Stop reason: SDUPTHER

## 2019-12-13 ENCOUNTER — OFFICE VISIT (OUTPATIENT)
Dept: FAMILY MEDICINE CLINIC | Age: 39
End: 2019-12-13
Payer: COMMERCIAL

## 2019-12-13 VITALS
SYSTOLIC BLOOD PRESSURE: 124 MMHG | HEART RATE: 76 BPM | WEIGHT: 293 LBS | DIASTOLIC BLOOD PRESSURE: 84 MMHG | OXYGEN SATURATION: 97 % | BODY MASS INDEX: 52.16 KG/M2

## 2019-12-13 DIAGNOSIS — E55.9 VITAMIN D DEFICIENCY: ICD-10-CM

## 2019-12-13 DIAGNOSIS — E66.01 MORBID OBESITY (HCC): ICD-10-CM

## 2019-12-13 DIAGNOSIS — D50.8 OTHER IRON DEFICIENCY ANEMIA: ICD-10-CM

## 2019-12-13 DIAGNOSIS — F41.9 ANXIETY: Primary | ICD-10-CM

## 2019-12-13 DIAGNOSIS — M72.2 PLANTAR FASCIITIS: ICD-10-CM

## 2019-12-13 DIAGNOSIS — M25.552 LEFT HIP PAIN: ICD-10-CM

## 2019-12-13 PROCEDURE — G8427 DOCREV CUR MEDS BY ELIG CLIN: HCPCS | Performed by: FAMILY MEDICINE

## 2019-12-13 PROCEDURE — G8417 CALC BMI ABV UP PARAM F/U: HCPCS | Performed by: FAMILY MEDICINE

## 2019-12-13 PROCEDURE — G8484 FLU IMMUNIZE NO ADMIN: HCPCS | Performed by: FAMILY MEDICINE

## 2019-12-13 PROCEDURE — 99214 OFFICE O/P EST MOD 30 MIN: CPT | Performed by: FAMILY MEDICINE

## 2019-12-13 PROCEDURE — 1036F TOBACCO NON-USER: CPT | Performed by: FAMILY MEDICINE

## 2019-12-13 RX ORDER — CHOLECALCIFEROL (VITAMIN D3) 125 MCG
1 CAPSULE ORAL DAILY
Qty: 30 TABLET | Refills: 5 | Status: ON HOLD | OUTPATIENT
Start: 2019-12-13 | End: 2021-08-15

## 2019-12-13 RX ORDER — MELOXICAM 15 MG/1
15 TABLET ORAL DAILY
Qty: 30 TABLET | Refills: 5 | Status: ON HOLD | OUTPATIENT
Start: 2019-12-13 | End: 2021-08-15

## 2019-12-13 RX ORDER — NAPROXEN 500 MG/1
500 TABLET ORAL 2 TIMES DAILY WITH MEALS
Qty: 60 TABLET | Refills: 5 | Status: ON HOLD | OUTPATIENT
Start: 2019-12-13 | End: 2021-08-15

## 2019-12-13 RX ORDER — CITALOPRAM 10 MG/1
10 TABLET ORAL DAILY
Qty: 30 TABLET | Refills: 3 | Status: SHIPPED | OUTPATIENT
Start: 2019-12-13 | End: 2019-12-25

## 2019-12-13 RX ORDER — PNV NO.95/FERROUS FUM/FOLIC AC 28MG-0.8MG
1 TABLET ORAL DAILY
Qty: 30 TABLET | Refills: 5 | Status: ON HOLD | OUTPATIENT
Start: 2019-12-13 | End: 2021-08-15

## 2019-12-13 ASSESSMENT — PATIENT HEALTH QUESTIONNAIRE - PHQ9
2. FEELING DOWN, DEPRESSED OR HOPELESS: 1
SUM OF ALL RESPONSES TO PHQ QUESTIONS 1-9: 1
SUM OF ALL RESPONSES TO PHQ9 QUESTIONS 1 & 2: 1
1. LITTLE INTEREST OR PLEASURE IN DOING THINGS: 0
SUM OF ALL RESPONSES TO PHQ QUESTIONS 1-9: 1

## 2019-12-25 ASSESSMENT — ENCOUNTER SYMPTOMS
SHORTNESS OF BREATH: 0
WHEEZING: 0

## 2020-02-06 ENCOUNTER — TELEPHONE (OUTPATIENT)
Dept: FAMILY MEDICINE CLINIC | Age: 40
End: 2020-02-06

## 2021-08-14 PROCEDURE — 96375 TX/PRO/DX INJ NEW DRUG ADDON: CPT

## 2021-08-14 PROCEDURE — 85025 COMPLETE CBC W/AUTO DIFF WBC: CPT

## 2021-08-14 PROCEDURE — 96374 THER/PROPH/DIAG INJ IV PUSH: CPT

## 2021-08-14 PROCEDURE — 83690 ASSAY OF LIPASE: CPT

## 2021-08-14 PROCEDURE — 93005 ELECTROCARDIOGRAM TRACING: CPT | Performed by: EMERGENCY MEDICINE

## 2021-08-14 PROCEDURE — 99285 EMERGENCY DEPT VISIT HI MDM: CPT

## 2021-08-14 PROCEDURE — 80053 COMPREHEN METABOLIC PANEL: CPT

## 2021-08-14 RX ORDER — ONDANSETRON 4 MG/1
4 TABLET, ORALLY DISINTEGRATING ORAL ONCE
Status: COMPLETED | OUTPATIENT
Start: 2021-08-14 | End: 2021-08-15

## 2021-08-14 ASSESSMENT — PAIN DESCRIPTION - LOCATION: LOCATION: ABDOMEN

## 2021-08-14 ASSESSMENT — PAIN DESCRIPTION - ORIENTATION: ORIENTATION: RIGHT;LOWER

## 2021-08-14 ASSESSMENT — PAIN SCALES - GENERAL: PAINLEVEL_OUTOF10: 9

## 2021-08-15 ENCOUNTER — APPOINTMENT (OUTPATIENT)
Dept: CT IMAGING | Age: 41
DRG: 463 | End: 2021-08-15
Payer: COMMERCIAL

## 2021-08-15 ENCOUNTER — HOSPITAL ENCOUNTER (INPATIENT)
Age: 41
LOS: 1 days | Discharge: HOME | DRG: 463 | End: 2021-08-16
Attending: EMERGENCY MEDICINE | Admitting: STUDENT IN AN ORGANIZED HEALTH CARE EDUCATION/TRAINING PROGRAM
Payer: COMMERCIAL

## 2021-08-15 ENCOUNTER — ANESTHESIA (OUTPATIENT)
Dept: OPERATING ROOM | Age: 41
DRG: 463 | End: 2021-08-15
Payer: COMMERCIAL

## 2021-08-15 ENCOUNTER — APPOINTMENT (OUTPATIENT)
Dept: GENERAL RADIOLOGY | Age: 41
DRG: 463 | End: 2021-08-15
Payer: COMMERCIAL

## 2021-08-15 ENCOUNTER — ANESTHESIA EVENT (OUTPATIENT)
Dept: OPERATING ROOM | Age: 41
DRG: 463 | End: 2021-08-15
Payer: COMMERCIAL

## 2021-08-15 VITALS — OXYGEN SATURATION: 100 % | TEMPERATURE: 98.6 F | SYSTOLIC BLOOD PRESSURE: 133 MMHG | DIASTOLIC BLOOD PRESSURE: 90 MMHG

## 2021-08-15 DIAGNOSIS — N10 ACUTE PYELONEPHRITIS: Primary | ICD-10-CM

## 2021-08-15 DIAGNOSIS — N20.1 URETEROLITHIASIS: ICD-10-CM

## 2021-08-15 PROBLEM — N13.2 HYDRONEPHROSIS WITH URINARY OBSTRUCTION DUE TO URETERAL CALCULUS: Status: ACTIVE | Noted: 2021-08-15

## 2021-08-15 LAB
ALBUMIN SERPL-MCNC: 4.1 GM/DL (ref 3.4–5)
ALP BLD-CCNC: 80 IU/L (ref 40–128)
ALT SERPL-CCNC: 30 U/L (ref 10–40)
ANION GAP SERPL CALCULATED.3IONS-SCNC: 10 MMOL/L (ref 4–16)
APTT: 27.1 SECONDS (ref 25.1–37.1)
AST SERPL-CCNC: 21 IU/L (ref 15–37)
BACTERIA: ABNORMAL /HPF
BASOPHILS ABSOLUTE: 0.1 K/CU MM
BASOPHILS RELATIVE PERCENT: 0.5 % (ref 0–1)
BILIRUB SERPL-MCNC: 0.5 MG/DL (ref 0–1)
BILIRUBIN URINE: NEGATIVE MG/DL
BLOOD, URINE: ABNORMAL
BUN BLDV-MCNC: 11 MG/DL (ref 6–23)
CALCIUM SERPL-MCNC: 9.1 MG/DL (ref 8.3–10.6)
CHLORIDE BLD-SCNC: 103 MMOL/L (ref 99–110)
CLARITY: ABNORMAL
CO2: 26 MMOL/L (ref 21–32)
COLOR: YELLOW
CREAT SERPL-MCNC: 0.8 MG/DL (ref 0.6–1.1)
DIFFERENTIAL TYPE: ABNORMAL
EKG ATRIAL RATE: 73 BPM
EKG ATRIAL RATE: 76 BPM
EKG DIAGNOSIS: NORMAL
EKG DIAGNOSIS: NORMAL
EKG P AXIS: 28 DEGREES
EKG P AXIS: 39 DEGREES
EKG P-R INTERVAL: 152 MS
EKG P-R INTERVAL: 156 MS
EKG Q-T INTERVAL: 358 MS
EKG Q-T INTERVAL: 370 MS
EKG QRS DURATION: 86 MS
EKG QRS DURATION: 92 MS
EKG QTC CALCULATION (BAZETT): 416 MS
EKG QTC CALCULATION (BAZETT): 430 MS
EKG R AXIS: 35 DEGREES
EKG R AXIS: 36 DEGREES
EKG T AXIS: 5 DEGREES
EKG T AXIS: 8 DEGREES
EKG VENTRICULAR RATE: 76 BPM
EKG VENTRICULAR RATE: 87 BPM
EOSINOPHILS ABSOLUTE: 0.1 K/CU MM
EOSINOPHILS RELATIVE PERCENT: 0.5 % (ref 0–3)
GFR AFRICAN AMERICAN: >60 ML/MIN/1.73M2
GFR NON-AFRICAN AMERICAN: >60 ML/MIN/1.73M2
GLUCOSE BLD-MCNC: 118 MG/DL (ref 70–99)
GLUCOSE, URINE: NEGATIVE MG/DL
HCT VFR BLD CALC: 40.1 % (ref 37–47)
HEMOGLOBIN: 12.8 GM/DL (ref 12.5–16)
IMMATURE NEUTROPHIL %: 0.4 % (ref 0–0.43)
INR BLD: 0.98 INDEX
INTERPRETATION: NORMAL
KETONES, URINE: ABNORMAL MG/DL
LEUKOCYTE ESTERASE, URINE: ABNORMAL
LIPASE: 22 IU/L (ref 13–60)
LYMPHOCYTES ABSOLUTE: 1.8 K/CU MM
LYMPHOCYTES RELATIVE PERCENT: 15.2 % (ref 24–44)
MCH RBC QN AUTO: 28.1 PG (ref 27–31)
MCHC RBC AUTO-ENTMCNC: 31.9 % (ref 32–36)
MCV RBC AUTO: 87.9 FL (ref 78–100)
MONOCYTES ABSOLUTE: 0.7 K/CU MM
MONOCYTES RELATIVE PERCENT: 5.6 % (ref 0–4)
MUCUS: ABNORMAL HPF
NITRITE URINE, QUANTITATIVE: NEGATIVE
NUCLEATED RBC %: 0 %
PDW BLD-RTO: 12.6 % (ref 11.7–14.9)
PH, URINE: 6 (ref 5–8)
PLATELET # BLD: 220 K/CU MM (ref 140–440)
PMV BLD AUTO: 9.6 FL (ref 7.5–11.1)
POTASSIUM SERPL-SCNC: 3.9 MMOL/L (ref 3.5–5.1)
PREGNANCY, URINE: NEGATIVE
PROTEIN UA: 30 MG/DL
PROTHROMBIN TIME: 12.7 SECONDS (ref 11.7–14.5)
RBC # BLD: 4.56 M/CU MM (ref 4.2–5.4)
RBC URINE: 34 /HPF (ref 0–6)
SARS-COV-2, NAAT: NOT DETECTED
SEGMENTED NEUTROPHILS ABSOLUTE COUNT: 9 K/CU MM
SEGMENTED NEUTROPHILS RELATIVE PERCENT: 77.8 % (ref 36–66)
SODIUM BLD-SCNC: 139 MMOL/L (ref 135–145)
SOURCE: NORMAL
SPECIFIC GRAVITY UA: 1.02 (ref 1–1.03)
SPECIFIC GRAVITY, URINE: 1.02 (ref 1–1.03)
SQUAMOUS EPITHELIAL: 3 /HPF
TOTAL IMMATURE NEUTOROPHIL: 0.05 K/CU MM
TOTAL NUCLEATED RBC: 0 K/CU MM
TOTAL PROTEIN: 7 GM/DL (ref 6.4–8.2)
TRICHOMONAS: ABNORMAL /HPF
TROPONIN T: <0.01 NG/ML
UROBILINOGEN, URINE: NEGATIVE MG/DL (ref 0.2–1)
WBC # BLD: 11.5 K/CU MM (ref 4–10.5)
WBC UA: 25 /HPF (ref 0–5)
YEAST: ABNORMAL /HPF

## 2021-08-15 PROCEDURE — 94761 N-INVAS EAR/PLS OXIMETRY MLT: CPT

## 2021-08-15 PROCEDURE — 81025 URINE PREGNANCY TEST: CPT

## 2021-08-15 PROCEDURE — 85730 THROMBOPLASTIN TIME PARTIAL: CPT

## 2021-08-15 PROCEDURE — 6360000004 HC RX CONTRAST MEDICATION: Performed by: EMERGENCY MEDICINE

## 2021-08-15 PROCEDURE — 6360000002 HC RX W HCPCS: Performed by: NURSE ANESTHETIST, CERTIFIED REGISTERED

## 2021-08-15 PROCEDURE — 2580000003 HC RX 258: Performed by: EMERGENCY MEDICINE

## 2021-08-15 PROCEDURE — 6360000004 HC RX CONTRAST MEDICATION: Performed by: SPECIALIST

## 2021-08-15 PROCEDURE — 36415 COLL VENOUS BLD VENIPUNCTURE: CPT

## 2021-08-15 PROCEDURE — 74177 CT ABD & PELVIS W/CONTRAST: CPT

## 2021-08-15 PROCEDURE — 3700000001 HC ADD 15 MINUTES (ANESTHESIA): Performed by: SPECIALIST

## 2021-08-15 PROCEDURE — 6370000000 HC RX 637 (ALT 250 FOR IP): Performed by: EMERGENCY MEDICINE

## 2021-08-15 PROCEDURE — C1769 GUIDE WIRE: HCPCS | Performed by: SPECIALIST

## 2021-08-15 PROCEDURE — 87086 URINE CULTURE/COLONY COUNT: CPT

## 2021-08-15 PROCEDURE — 1200000000 HC SEMI PRIVATE

## 2021-08-15 PROCEDURE — 87088 URINE BACTERIA CULTURE: CPT

## 2021-08-15 PROCEDURE — 2709999900 HC NON-CHARGEABLE SUPPLY: Performed by: SPECIALIST

## 2021-08-15 PROCEDURE — C1758 CATHETER, URETERAL: HCPCS | Performed by: SPECIALIST

## 2021-08-15 PROCEDURE — 3600000003 HC SURGERY LEVEL 3 BASE: Performed by: SPECIALIST

## 2021-08-15 PROCEDURE — C2617 STENT, NON-COR, TEM W/O DEL: HCPCS | Performed by: SPECIALIST

## 2021-08-15 PROCEDURE — 3700000000 HC ANESTHESIA ATTENDED CARE: Performed by: SPECIALIST

## 2021-08-15 PROCEDURE — 94664 DEMO&/EVAL PT USE INHALER: CPT

## 2021-08-15 PROCEDURE — 3600000013 HC SURGERY LEVEL 3 ADDTL 15MIN: Performed by: SPECIALIST

## 2021-08-15 PROCEDURE — BT1D1ZZ FLUOROSCOPY OF RIGHT KIDNEY, URETER AND BLADDER USING LOW OSMOLAR CONTRAST: ICD-10-PCS | Performed by: SPECIALIST

## 2021-08-15 PROCEDURE — 85610 PROTHROMBIN TIME: CPT

## 2021-08-15 PROCEDURE — 84484 ASSAY OF TROPONIN QUANT: CPT

## 2021-08-15 PROCEDURE — 93010 ELECTROCARDIOGRAM REPORT: CPT | Performed by: INTERNAL MEDICINE

## 2021-08-15 PROCEDURE — 94150 VITAL CAPACITY TEST: CPT

## 2021-08-15 PROCEDURE — 2500000003 HC RX 250 WO HCPCS: Performed by: NURSE ANESTHETIST, CERTIFIED REGISTERED

## 2021-08-15 PROCEDURE — 0T768DZ DILATION OF RIGHT URETER WITH INTRALUMINAL DEVICE, VIA NATURAL OR ARTIFICIAL OPENING ENDOSCOPIC: ICD-10-PCS | Performed by: SPECIALIST

## 2021-08-15 PROCEDURE — 87635 SARS-COV-2 COVID-19 AMP PRB: CPT

## 2021-08-15 PROCEDURE — 2580000003 HC RX 258: Performed by: STUDENT IN AN ORGANIZED HEALTH CARE EDUCATION/TRAINING PROGRAM

## 2021-08-15 PROCEDURE — 87186 SC STD MICRODIL/AGAR DIL: CPT

## 2021-08-15 PROCEDURE — 6360000002 HC RX W HCPCS: Performed by: EMERGENCY MEDICINE

## 2021-08-15 PROCEDURE — 81001 URINALYSIS AUTO W/SCOPE: CPT

## 2021-08-15 PROCEDURE — 76000 FLUOROSCOPY <1 HR PHYS/QHP: CPT

## 2021-08-15 DEVICE — VARIABLE LENGTH URETERAL STENT
Type: IMPLANTABLE DEVICE | Status: FUNCTIONAL
Brand: CONTOUR VL™

## 2021-08-15 RX ORDER — LIDOCAINE HYDROCHLORIDE 20 MG/ML
INJECTION, SOLUTION INTRAVENOUS PRN
Status: DISCONTINUED | OUTPATIENT
Start: 2021-08-15 | End: 2021-08-15 | Stop reason: SDUPTHER

## 2021-08-15 RX ORDER — POLYETHYLENE GLYCOL 3350 17 G/17G
17 POWDER, FOR SOLUTION ORAL DAILY PRN
Status: DISCONTINUED | OUTPATIENT
Start: 2021-08-15 | End: 2021-08-16 | Stop reason: HOSPADM

## 2021-08-15 RX ORDER — MORPHINE SULFATE 4 MG/ML
4 INJECTION, SOLUTION INTRAMUSCULAR; INTRAVENOUS ONCE
Status: COMPLETED | OUTPATIENT
Start: 2021-08-15 | End: 2021-08-15

## 2021-08-15 RX ORDER — MORPHINE SULFATE 2 MG/ML
2 INJECTION, SOLUTION INTRAMUSCULAR; INTRAVENOUS
Status: DISCONTINUED | OUTPATIENT
Start: 2021-08-15 | End: 2021-08-16 | Stop reason: HOSPADM

## 2021-08-15 RX ORDER — ACETAMINOPHEN 325 MG/1
650 TABLET ORAL EVERY 4 HOURS PRN
Status: DISCONTINUED | OUTPATIENT
Start: 2021-08-15 | End: 2021-08-16 | Stop reason: HOSPADM

## 2021-08-15 RX ORDER — CEFAZOLIN SODIUM 2 G/100ML
2000 INJECTION, SOLUTION INTRAVENOUS ONCE
Status: DISCONTINUED | OUTPATIENT
Start: 2021-08-15 | End: 2021-08-15

## 2021-08-15 RX ORDER — FENTANYL CITRATE 50 UG/ML
INJECTION, SOLUTION INTRAMUSCULAR; INTRAVENOUS PRN
Status: DISCONTINUED | OUTPATIENT
Start: 2021-08-15 | End: 2021-08-15 | Stop reason: SDUPTHER

## 2021-08-15 RX ORDER — SODIUM CHLORIDE 0.9 % (FLUSH) 0.9 %
5-40 SYRINGE (ML) INJECTION 2 TIMES DAILY
Status: DISCONTINUED | OUTPATIENT
Start: 2021-08-15 | End: 2021-08-16 | Stop reason: HOSPADM

## 2021-08-15 RX ORDER — KETAMINE HCL 50MG/ML(1)
SYRINGE (ML) INTRAVENOUS PRN
Status: DISCONTINUED | OUTPATIENT
Start: 2021-08-15 | End: 2021-08-15 | Stop reason: SDUPTHER

## 2021-08-15 RX ORDER — SODIUM CHLORIDE 0.9 % (FLUSH) 0.9 %
5-40 SYRINGE (ML) INJECTION PRN
Status: DISCONTINUED | OUTPATIENT
Start: 2021-08-15 | End: 2021-08-16 | Stop reason: HOSPADM

## 2021-08-15 RX ORDER — 0.9 % SODIUM CHLORIDE 0.9 %
1000 INTRAVENOUS SOLUTION INTRAVENOUS ONCE
Status: COMPLETED | OUTPATIENT
Start: 2021-08-15 | End: 2021-08-15

## 2021-08-15 RX ORDER — PROCHLORPERAZINE EDISYLATE 5 MG/ML
10 INJECTION INTRAMUSCULAR; INTRAVENOUS EVERY 6 HOURS PRN
Status: DISCONTINUED | OUTPATIENT
Start: 2021-08-15 | End: 2021-08-16 | Stop reason: HOSPADM

## 2021-08-15 RX ORDER — SODIUM CHLORIDE 9 MG/ML
INJECTION, SOLUTION INTRAVENOUS CONTINUOUS
Status: DISPENSED | OUTPATIENT
Start: 2021-08-15 | End: 2021-08-15

## 2021-08-15 RX ORDER — ONDANSETRON 2 MG/ML
8 INJECTION INTRAMUSCULAR; INTRAVENOUS ONCE
Status: DISCONTINUED | OUTPATIENT
Start: 2021-08-15 | End: 2021-08-15

## 2021-08-15 RX ORDER — KETOROLAC TROMETHAMINE 30 MG/ML
30 INJECTION, SOLUTION INTRAMUSCULAR; INTRAVENOUS ONCE
Status: COMPLETED | OUTPATIENT
Start: 2021-08-15 | End: 2021-08-15

## 2021-08-15 RX ORDER — PROPOFOL 10 MG/ML
INJECTION, EMULSION INTRAVENOUS PRN
Status: DISCONTINUED | OUTPATIENT
Start: 2021-08-15 | End: 2021-08-15 | Stop reason: SDUPTHER

## 2021-08-15 RX ORDER — SODIUM CHLORIDE 9 MG/ML
25 INJECTION, SOLUTION INTRAVENOUS PRN
Status: DISCONTINUED | OUTPATIENT
Start: 2021-08-15 | End: 2021-08-16 | Stop reason: HOSPADM

## 2021-08-15 RX ORDER — SODIUM CHLORIDE 0.9 % (FLUSH) 0.9 %
5-40 SYRINGE (ML) INJECTION EVERY 12 HOURS SCHEDULED
Status: DISCONTINUED | OUTPATIENT
Start: 2021-08-15 | End: 2021-08-16 | Stop reason: HOSPADM

## 2021-08-15 RX ADMIN — IOPAMIDOL 75 ML: 755 INJECTION, SOLUTION INTRAVENOUS at 05:08

## 2021-08-15 RX ADMIN — FENTANYL CITRATE 50 MCG: 50 INJECTION, SOLUTION INTRAMUSCULAR; INTRAVENOUS at 11:00

## 2021-08-15 RX ADMIN — LIDOCAINE HYDROCHLORIDE 100 MG: 20 INJECTION, SOLUTION INTRAVENOUS at 10:53

## 2021-08-15 RX ADMIN — Medication 5 MG: at 11:02

## 2021-08-15 RX ADMIN — FENTANYL CITRATE 50 MCG: 50 INJECTION, SOLUTION INTRAMUSCULAR; INTRAVENOUS at 10:53

## 2021-08-15 RX ADMIN — SODIUM CHLORIDE: 9 INJECTION, SOLUTION INTRAVENOUS at 14:39

## 2021-08-15 RX ADMIN — CEFTRIAXONE SODIUM 1000 MG: 1 INJECTION, POWDER, FOR SOLUTION INTRAMUSCULAR; INTRAVENOUS at 06:37

## 2021-08-15 RX ADMIN — SODIUM CHLORIDE 1000 ML: 9 INJECTION, SOLUTION INTRAVENOUS at 06:37

## 2021-08-15 RX ADMIN — KETOROLAC TROMETHAMINE 30 MG: 30 INJECTION, SOLUTION INTRAMUSCULAR; INTRAVENOUS at 01:08

## 2021-08-15 RX ADMIN — PROPOFOL 170 MG: 10 INJECTION, EMULSION INTRAVENOUS at 10:53

## 2021-08-15 RX ADMIN — MORPHINE SULFATE 4 MG: 4 INJECTION INTRAVENOUS at 06:37

## 2021-08-15 RX ADMIN — Medication 25 MG: at 10:53

## 2021-08-15 RX ADMIN — Medication 10 MG: at 11:06

## 2021-08-15 RX ADMIN — SODIUM CHLORIDE: 9 INJECTION, SOLUTION INTRAVENOUS at 08:30

## 2021-08-15 RX ADMIN — ONDANSETRON 4 MG: 4 TABLET, ORALLY DISINTEGRATING ORAL at 00:57

## 2021-08-15 RX ADMIN — MORPHINE SULFATE 4 MG: 4 INJECTION INTRAVENOUS at 01:09

## 2021-08-15 RX ADMIN — Medication 10 MG: at 10:58

## 2021-08-15 ASSESSMENT — PULMONARY FUNCTION TESTS
PIF_VALUE: 1
PIF_VALUE: 1
PIF_VALUE: 2
PIF_VALUE: 1
PIF_VALUE: 2
PIF_VALUE: 1
PIF_VALUE: 2
PIF_VALUE: 1

## 2021-08-15 ASSESSMENT — ENCOUNTER SYMPTOMS
NAUSEA: 1
ABDOMINAL PAIN: 1
VOMITING: 1
COUGH: 0
CHEST TIGHTNESS: 0
WHEEZING: 0
BACK PAIN: 1
SHORTNESS OF BREATH: 0
EYES NEGATIVE: 1

## 2021-08-15 ASSESSMENT — PAIN SCALES - GENERAL
PAINLEVEL_OUTOF10: 0
PAINLEVEL_OUTOF10: 6
PAINLEVEL_OUTOF10: 8
PAINLEVEL_OUTOF10: 5

## 2021-08-15 ASSESSMENT — LIFESTYLE VARIABLES: SMOKING_STATUS: 0

## 2021-08-15 NOTE — ED NOTES
Lactic drawn if needed.  Pt informed of need for urine sample     Saima Levin, FERCHO  08/14/21 [id number]

## 2021-08-15 NOTE — CONSULTS
[facility]   Consult Note  [facility] 1 2 3 4 5    Date: 8/15/2021   Patient: Lacy Jordan   : 1980   DOA: 8/15/2021   MRN: [id number]   ROOM#: OR/NONE     Reason for Consult:  Right flank pain  Requesting Physician:  Dr. Belle Noonan  Collaborating Urologist on Call at time of admission: Alfonso Ache:  \"my right side hurts\"    History Obtained From:  patient    HISTORY OF PRESENT ILLNESS:                The patient is a 42 y.o. female with significant past medical history of n/a who presented with acute onset of right flank pain -- no previous  history. +n/v  No f/c    Past Medical History:    History reviewed. No pertinent past medical history. Past Surgical History:        Procedure Laterality Date    APPENDECTOMY  06/10/2020     SECTION      HYSTERECTOMY  06/10/2020     Current Medications:   Current Facility-Administered Medications: sodium chloride flush 0.9 % injection 5-40 mL, 5-40 mL, Intravenous, BID  sertraline (ZOLOFT) tablet 50 mg, 50 mg, Oral, Daily  0.9 % sodium chloride infusion, , Intravenous, Continuous  sodium chloride flush 0.9 % injection 5-40 mL, 5-40 mL, Intravenous, 2 times per day  sodium chloride flush 0.9 % injection 5-40 mL, 5-40 mL, Intravenous, PRN  0.9 % sodium chloride infusion, 25 mL, Intravenous, PRN  acetaminophen (TYLENOL) tablet 650 mg, 650 mg, Oral, Q4H PRN  polyethylene glycol (GLYCOLAX) packet 17 g, 17 g, Oral, Daily PRN  [START ON 2021] cefTRIAXone (ROCEPHIN) 1000 mg IVPB in 50 mL D5W minibag, 1,000 mg, Intravenous, Q24H  morphine (PF) injection 2 mg, 2 mg, Intravenous, Q2H PRN  prochlorperazine (COMPAZINE) injection 10 mg, 10 mg, Intravenous, Q6H PRN  ceFAZolin (ANCEF) 2000 mg in dextrose 4 % 100 mL IVPB (premix), 2,000 mg, Intravenous, Once    Allergies:  Fentanyl    Social History:   TOBACCO:   reports that she has never smoked.  She has never used smokeless tobacco.  ETOH:   reports no history of alcohol use. DRUGS:   reports no history of drug use. Family History:   History reviewed. No pertinent family history. REVIEW OF SYSTEMS:     CONSTITUTIONAL:  negative  EYES:  negative  HEENT:  negative  RESPIRATORY:  negative  CARDIOVASCULAR:  negative  GASTROINTESTINAL:  positive for abdominal pain  GENITOURINARY:  negative  INTEGUMENT/BREAST:  negative  HEMATOLOGIC/LYMPHATIC:  negative  ALLERGIC/IMMUNOLOGIC:  negative  ENDOCRINE:  negative  MUSCULOSKELETAL:  negative  NEUROLOGICAL:  negative  BEHAVIOR/PSYCH:  negative    PHYSICAL EXAM:      VITALS:  /69   Pulse 68   Temp 97.8 °F (36.6 °C) (Temporal)   Resp 16   Ht 5' 7\" (1.702 m)   Wt (!) 320 lb (145.2 kg)   SpO2 100%   BMI 50.12 kg/m²      TEMPERATURE:  Current - Temp: 97.8 °F (36.6 °C);  Max - Temp  Av.8 °F (36.6 °C)  Min: 97.8 °F (36.6 °C)  Max: 97.8 °F (36.6 °C)  24HR BLOOD PRESSURE RANGE:  Systolic (03DSU), ZYK:369 , Min:123 , RAX:999   ; Diastolic (54VOI), WYATT:19, Min:69, Max:88      /67   Pulse 66   Temp 97.8 °F (36.6 °C) (Temporal)   Resp 16   Ht 5' 7\" (1.702 m)   Wt (!) 320 lb (145.2 kg)   SpO2 99%   BMI 50.12 kg/m²   General appearance: alert, appears stated age and cooperative  Heart: regular rate and rhythm  Abdomen: soft, non-tender; bowel sounds normal; no masses,  no organomegaly  Extremities: extremities normal, atraumatic, no cyanosis or edema    DATA:    WBC:    Lab Results   Component Value Date    WBC 11.5 2021     Hemoglobin/Hematocrit:    Lab Results   Component Value Date    HGB 12.8 2021    HCT 40.1 2021     BMP:    Lab Results   Component Value Date     2021    K 3.9 2021     2021    CO2 26 2021    BUN 11 2021    LABALBU 4.1 2021    CREATININE 0.8 2021    CALCIUM 9.1 2021    GFRAA >60 2021    LABGLOM >60 2021     PT/INR:    Lab Results   Component Value Date    PROTIME 12.7 08/15/2021    INR 0.98 08/15/2021 Blood Culture:   Urine Culture:     Imaging:  CT ABDOMEN PELVIS W IV CONTRAST Additional Contrast? None    Result Date: 8/15/2021  EXAMINATION: CT OF THE ABDOMEN AND PELVIS WITH CONTRAST 8/15/2021 5:09 am TECHNIQUE: CT of the abdomen and pelvis was performed with the administration of intravenous contrast. Multiplanar reformatted images are provided for review. Dose modulation, iterative reconstruction, and/or weight based adjustment of the mA/kV was utilized to reduce the radiation dose to as low as reasonably achievable. COMPARISON: December 16, 2016 HISTORY: ORDERING SYSTEM PROVIDED HISTORY: rlq pain TECHNOLOGIST PROVIDED HISTORY: Reason for exam:->rlq pain Additional Contrast?->None Decision Support Exception - unselect if not a suspected or confirmed emergency medical condition->Emergency Medical Condition (MA) Reason for Exam: RLQ PAIN Acuity: Acute Type of Exam: Initial FINDINGS: Lower Chest: Clear lung bases. Organs: The liver, gallbladder, spleen, pancreas, and adrenal glands demonstrate no acute abnormality. The left kidney and collecting system are normal. Mild hypoenhancement of the right kidney is seen relative to the left. Mild perinephric stranding is present. Nonobstructing stone is seen in the lower pole measuring up to 6 mm. The right collecting system demonstrates mild to moderate hydronephrosis and hydroureter secondary to a 10 mm stone in the distal ureter. GI/Bowel: The stomach, small bowel, and colon are normal in course and caliber without evidence of wall thickening or obstruction. Status post appendectomy. Pelvis: Normal bladder. Status post hysterectomy. No adnexal masses. Peritoneum/Retroperitoneum: No free fluid or free air. No pathologic lymphadenopathy. Aorta and its branches are patent and normal in course and caliber. No significant atherosclerosis. Bones/Soft Tissues: No acute or aggressive osseous lesion.   Grade 1 anterolisthesis of L5 on S1 is seen secondary to bilateral L5 pars defects. Severe degenerative changes are identified. 1. Mild-to-moderate right hydronephrosis and hydroureter secondary to a 10 mm stone in the distal ureter. 2. Nonobstructing right nephrolithiasis. 3. Status post appendectomy and hysterectomy. Assessment & Plan:      Cassy Cunningham is a 42y.o. year old female admitted 8/15/2021 for right ureteral calculus    1) UA suspicious for infection -- recommend cysto with right stent placement today - IV abx -- reassess later today for disposition      Patient seen and examined, chart reviewed.      Electronically signed by Lizbeth Shay MD on 8/15/2021 at 10:24 AM

## 2021-08-15 NOTE — PROGRESS NOTES
0920: Arrived to PACU while waiting to go surgery. Monitors applied, alarms on.  0935: Dr. Laverne Lerma at bedside. Anesthesia consent obtained.   1019: Dr. Cheryl Uribe at bedside Consent obtained

## 2021-08-15 NOTE — ANESTHESIA POSTPROCEDURE EVALUATION
Department of Anesthesiology  Postprocedure Note    Patient: Mecca Merritt  MRN: [id number]  YOB: 1980  Date of evaluation: 8/15/2021  Time:  11:22 AM     Procedure Summary     Date: 08/15/21 Room / Location: [address]    Anesthesia Start: 8116 Anesthesia Stop: 8645    Procedure: CYSTOSCOPY RETROGRADE PYELOGRAM STENT INSERTION (N/A ) Diagnosis: (KIDNEY STONE)    Surgeons: Candace Razo MD Responsible Provider: Eligio Romero MD    Anesthesia Type: MAC ASA Status: 3          Anesthesia Type: MAC    Shelia Phase I:      Shelia Phase II:      Last vitals: Reviewed and per EMR flowsheets.        Anesthesia Post Evaluation    Patient location during evaluation: bedside  Patient participation: complete - patient participated  Level of consciousness: awake and alert  Pain score: 0  Airway patency: patent  Nausea & Vomiting: no nausea and no vomiting  Complications: no  Cardiovascular status: hemodynamically stable  Respiratory status: acceptable  Hydration status: euvolemic

## 2021-08-15 NOTE — H&P
abdominal pain. The pain is described as aching and cramping, and is 10/10 in intensity. The patient is experiencing RLQ and with radiating to the back and to the thigh . Onset was Around 8:30 PM last night  . Symptoms have been gradually worsening. Aggravating factors: none. Alleviating factors: none. Tried Tylenol, different positioning with minimal relief. Associated symptoms: nausea and vomiting. The patient denies chills, constipation, diarrhea, dysuria, fever, headache, hematuria, myalgias and sweats. Denies history of kidney stones. Past surgical history of partial hysterectomy, appendectomy. She occasionally drinks pop. Review of Systems   Constitutional: Negative for chills, fatigue and fever. HENT: Negative. Eyes: Negative. Respiratory: Negative for cough, chest tightness, shortness of breath and wheezing. Cardiovascular: Negative for chest pain and palpitations. Gastrointestinal: Positive for abdominal pain, nausea and vomiting. Genitourinary: Positive for difficulty urinating and flank pain. Negative for dysuria, hematuria, pelvic pain and urgency. Musculoskeletal: Positive for back pain. Negative for gait problem and myalgias. Skin: Negative. Neurological: Negative for dizziness, syncope and weakness. Psychiatric/Behavioral: The patient is not nervous/anxious. Ten point ROS reviewed negative, unless as noted above    Objective:   No intake or output data in the 24 hours ending 08/15/21 0847   Vitals:   Vitals:    08/15/21 0825   BP: 125/78   Pulse: 67   Resp: 16   Temp: 97.8 °F (36.6 °C)   SpO2: 98%     Physical Exam:   Physical Exam  Constitutional:       General: She is not in acute distress. Appearance: Normal appearance. She is obese. She is not ill-appearing. HENT:      Head: Normocephalic and atraumatic. Nose: Nose normal.      Mouth/Throat:      Mouth: Mucous membranes are dry. Eyes:      Extraocular Movements: Extraocular movements intact. Conjunctiva/sclera: Conjunctivae normal.   Cardiovascular:      Rate and Rhythm: Normal rate and regular rhythm. Pulses: Normal pulses. Heart sounds: Normal heart sounds. No murmur heard. No friction rub. No gallop. Pulmonary:      Effort: Pulmonary effort is normal.      Breath sounds: Normal breath sounds. No wheezing, rhonchi or rales. Chest:      Chest wall: No tenderness. Abdominal:      General: Abdomen is flat. Bowel sounds are normal. There is no distension. Palpations: Abdomen is soft. Tenderness: There is no abdominal tenderness. There is no guarding or rebound. Musculoskeletal:      Right lower leg: No edema. Left lower leg: No edema. Skin:     General: Skin is warm. Neurological:      General: No focal deficit present. Mental Status: She is alert and oriented to person, place, and time. Psychiatric:         Mood and Affect: Mood normal.         Behavior: Behavior normal.         Past Medical History:    History reviewed. No pertinent past medical history. PSHX:  has a past surgical history that includes  section. Allergies: Allergies   Allergen Reactions    Fentanyl Itching       FAM HX: family history is not on file.   Soc HX:   Social History     Socioeconomic History    Marital status: Single     Spouse name: None    Number of children: None    Years of education: None    Highest education level: None   Occupational History    None   Tobacco Use    Smoking status: Never Smoker    Smokeless tobacco: Never Used   Substance and Sexual Activity    Alcohol use: No    Drug use: No    Sexual activity: Yes     Partners: Male   Other Topics Concern    None   Social History Narrative    None     Social Determinants of Health     Financial Resource Strain:     Difficulty of Paying Living Expenses:    Food Insecurity:     Worried About Running Out of Food in the Last Year:     Kenyatta of Food in the Last Year:    Transportation Needs:  Lack of Transportation (Medical):  Lack of Transportation (Non-Medical):    Physical Activity:     Days of Exercise per Week:     Minutes of Exercise per Session:    Stress:     Feeling of Stress :    Social Connections:     Frequency of Communication with Friends and Family:     Frequency of Social Gatherings with Friends and Family:     Attends Nondenominational Services:     Active Member of Clubs or Organizations:     Attends Club or Organization Meetings:     Marital Status:    Intimate Partner Violence:     Fear of Current or Ex-Partner:     Emotionally Abused:     Physically Abused:     Sexually Abused:        Medications:   Medications:    sodium chloride flush  5-40 mL Intravenous BID    sertraline  50 mg Oral Daily    sodium chloride flush  5-40 mL Intravenous 2 times per day    [START ON 8/16/2021] cefTRIAXone (ROCEPHIN) IV  1,000 mg Intravenous Q24H      Infusions:    sodium chloride 150 mL/hr at 08/15/21 0830    sodium chloride       PRN Meds: sodium chloride flush, 5-40 mL, PRN  sodium chloride, 25 mL, PRN  acetaminophen, 650 mg, Q4H PRN  polyethylene glycol, 17 g, Daily PRN  morphine, 2 mg, Q2H PRN  prochlorperazine, 10 mg, Q6H PRN    CT ABDOMEN PELVIS W IV CONTRAST Additional Contrast? None    Result Date: 8/15/2021  1. Mild-to-moderate right hydronephrosis and hydroureter secondary to a 10 mm stone in the distal ureter. 2. Nonobstructing right nephrolithiasis. 3. Status post appendectomy and hysterectomy.      Electronically signed by IRIS Liu on 8/15/2021 at 8:47 AM

## 2021-08-15 NOTE — OP NOTE
snug in there, another reason for this big of her stone, large  stone, we will go ahead and leave a stent in for a week or two, make  sure urine is sterile, and bring her back at a later date. So once we  got that passed under direct fluoroscopy, the stone into the kidney, a  4.5-Danish variable-length double-J ureteral stent was passed over the  guidewire in a standard fashion. A little snug by the stone, there was  a pop it through there, and release of cloudy urine, debris, not ally  purulence though. Once position was confirmed using fluoroscopy,  cystoscopy deemed good. The patient's bladder was emptied and taken to  recovery room having tolerated the procedure well. DISCHARGE SUMMARY:  The patient was discharged from Same-day Surgery in  stable condition, will be transferred to floor, and reassessed later  today or tomorrow morning.         Hardik Whiting MD    D: 08/15/2021 11:17:01       T: 08/15/2021 11:19:39     [email or web address]  Job#: [id number]     Doc#: [id number]    CC:

## 2021-08-15 NOTE — BRIEF OP NOTE
Brief Postoperative Note      Patient: Daniel Kirby  YOB: 1980  MRN: [id number]    Date of Procedure: 8/15/2021    Pre-Op Diagnosis: KIDNEY STONE    Post-Op Diagnosis: Same       Procedure(s):  CYSTOSCOPY RETROGRADE PYELOGRAM STENT INSERTION    Surgeon(s):  Domenico Blair MD    Assistant:  * No surgical staff found *    Anesthesia: General    Estimated Blood Loss (mL): Minimal    Complications: None    Specimens:   * No specimens in log *    Implants:  Implant Name Type Inv.  Item Serial No.  Lot No. LRB No. Used Action   STENT URET 4.8FR [id number] PERCFLX HYDR+ SFT PGTL TAPR TIP  STENT URET 4.8FR [id number] PERCFLX HYDR+ SFT PGTL TAPR TIP  [employer]- [id number] Right 1 Implanted         Drains: * No LDAs found *    Findings: 10 mm right distal stone    Electronically signed by Domenico Blair MD on 8/15/2021 at 11:10 AM

## 2021-08-15 NOTE — Clinical Note
Patient Class: Inpatient [101]   REQUIRED: Diagnosis: Hydronephrosis with urinary obstruction due to ureteral calculus [[id number]]   Estimated Length of Stay: Estimated stay of more than 2 midnights   Admitting Provider: Carmen German [[id number]]

## 2021-08-15 NOTE — PLAN OF CARE
improve  Description: Ability to achieve and maintain a regular respiratory rate will improve  Outcome: Ongoing     Problem: Safety:  Goal: Ability to remain free from injury will improve  Description: Ability to remain free from injury will improve  Outcome: Ongoing     Problem: Skin Integrity:  Goal: Demonstration of wound healing without infection will improve  Description: Demonstration of wound healing without infection will improve  Outcome: Ongoing

## 2021-08-15 NOTE — ED PROVIDER NOTES
Triage Chief Complaint:   Abdominal Pain (RLQ started sharply arfter eating, not resolving. )    [place]:  Alesha Michael is a 42 y.o. female that presents with right lower quadrant abdominal pain. The patient states that while she was eating a few hours ago she had sudden onset sharp right lower quadrant abdominal pain that is constant, nonradiating and worse with certain movements. She has associated nausea and vomiting. Denies any diarrhea, constipation, urinary symptoms. Patient has previously had an appendectomy and partial hysterectomy with right oophorectomy. States that this feels like prior appendicitis. She has not had any fevers. ROS:  At least 10 systems reviewed and otherwise acutely negative except as in the [address] History reviewed. No pertinent past medical history. Past Surgical History:   Procedure Laterality Date     SECTION       History reviewed. No pertinent family history. Social History     Socioeconomic History    Marital status: Single     Spouse name: Not on file    Number of children: Not on file    Years of education: Not on file    Highest education level: Not on file   Occupational History    Not on file   Tobacco Use    Smoking status: Never Smoker    Smokeless tobacco: Never Used   Substance and Sexual Activity    Alcohol use: No    Drug use: No    Sexual activity: Yes     Partners: Male   Other Topics Concern    Not on file   Social History Narrative    Not on file     Social Determinants of Health     Financial Resource Strain:     Difficulty of Paying Living Expenses:    Food Insecurity:     Worried About Running Out of Food in the Last Year:     [address] in the Last Year:    Transportation Needs:     Lack of Transportation (Medical):      Lack of Transportation (Non-Medical):    Physical Activity:     Days of Exercise per Week:     Minutes of Exercise per Session:    Stress:     Feeling of Stress :    Social Connections:     Frequency of Communication with Friends and Family:     Frequency of Social Gatherings with Friends and Family:     Attends [employer] Services:     Active Member of Clubs or Organizations:     Attends Club or Organization Meetings:     Marital Status:    Intimate Partner Violence:     Fear of Current or Ex-Partner:     Emotionally Abused:     Physically Abused:     Sexually Abused:      Current Facility-Administered Medications   Medication Dose Route Frequency Provider Last Rate Last Admin    ondansetron (ZOFRAN) injection 8 mg  8 mg Intravenous Once Anibal Stratton MD        sodium chloride flush 0.9 % injection 5-40 mL  5-40 mL Intravenous BID Anibal Stratton MD        cefTRIAXone (ROCEPHIN) 1000 mg IVPB in 50 mL D5W minibag  1,000 mg Intravenous Once Anibal Stratton MD        0.9 % sodium chloride bolus  1,000 mL Intravenous Once Anibal Stratton MD        morphine sulfate (PF) injection 4 mg  4 mg Intravenous Once Anibal Stratton MD         Current Outpatient Medications   Medication Sig Dispense Refill    Cholecalciferol (VITAMIN D3) 50 MCG (2000 UT) TABS Take 1 tablet by mouth daily 30 tablet 5    Ferrous Sulfate (IRON) 325 (65 Fe) MG TABS Take 1 tablet by mouth daily 30 tablet 5    meloxicam (MOBIC) 15 MG tablet Take 1 tablet by mouth daily 30 tablet 5    naproxen (NAPROSYN) 500 MG tablet Take 1 tablet by mouth 2 times daily (with meals) 60 tablet 5    sertraline (ZOLOFT) 50 MG tablet Take 1 tablet by mouth daily 30 tablet 5    acyclovir (ZOVIRAX) 400 MG tablet TAKE ONE TABLET BY MOUTH TWICE DAILY (Patient not taking: Reported on 12/13/2019) 60 tablet 2     Allergies   Allergen Reactions    Fentanyl Itching       Nursing Notes Reviewed    Physical Exam:  ED Triage Vitals [08/14/21 2321]   Enc Vitals Group      BP (!) 160/88      Pulse 75      Resp 25      Temp 97.8 °F (36.6 °C)      Temp Source Oral      SpO2 98 %      Weight (!) 320 lb (145.2 kg)      Height 5' 7\" (1.702 m)      Head Circumference Peak Flow       Pain Score       Pain Loc       Pain Edu? Excl. in [address]? GENERAL APPEARANCE: Awake and alert. Cooperative. No acute distress. Appears uncomfortable secondary to pain  HEAD: Normocephalic. Atraumatic. EYES: EOM's grossly intact. Sclera anicteric. ENT: Mucous membranes are moist. Tolerates saliva. No trismus. NECK: Supple. Trachea midline. HEART: RRR. Radial pulses 2+. LUNGS: Respirations unlabored. CTAB  ABDOMEN: Soft. Tender to palpation right lower quadrant. Nondistended. No guarding or rebound. EXTREMITIES: No acute deformities. SKIN: Warm and dry. NEUROLOGICAL: No gross facial drooping. Moves all 4 extremities spontaneously. PSYCHIATRIC: Normal mood.     I have reviewed and interpreted all of the currently available lab results from this visit (if applicable):  Results for orders placed or performed during the hospital encounter of 08/15/21   Comprehensive Metabolic Panel   Result Value Ref Range    Sodium 139 135 - 145 MMOL/L    Potassium 3.9 3.5 - 5.1 MMOL/L    Chloride 103 99 - 110 mMol/L    CO2 26 21 - 32 MMOL/L    BUN 11 6 - 23 MG/DL    CREATININE 0.8 0.6 - 1.1 MG/DL    Glucose 118 (H) 70 - 99 MG/DL    Calcium 9.1 8.3 - 10.6 MG/DL    Albumin 4.1 3.4 - 5.0 GM/DL    Total Protein 7.0 6.4 - 8.2 GM/DL    Total Bilirubin 0.5 0.0 - 1.0 MG/DL    ALT 30 10 - 40 U/L    AST 21 15 - 37 IU/L    Alkaline Phosphatase 80 40 - 128 IU/L    GFR Non-African American >60 >60 mL/min/1.73m2    GFR African American >60 >60 mL/min/1.73m2    Anion Gap 10 4 - 16   CBC Auto Differential   Result Value Ref Range    WBC 11.5 (H) 4.0 - 10.5 K/CU MM    RBC 4.56 4.2 - 5.4 M/CU MM    Hemoglobin 12.8 12.5 - 16.0 GM/DL    Hematocrit 40.1 37 - 47 %    MCV 87.9 78 - 100 FL    MCH 28.1 27 - 31 PG    MCHC 31.9 (L) 32.0 - 36.0 %    RDW 12.6 11.7 - 14.9 %    Platelets 086 007 - 661 K/CU MM    MPV 9.6 7.5 - 11.1 FL    Differential Type AUTOMATED DIFFERENTIAL     Segs Relative 77.8 (H) 36 - 66 %    Lymphocytes % 15.2 (L) 24 - 44 %    Monocytes % 5.6 (H) 0 - 4 %    Eosinophils % 0.5 0 - 3 %    Basophils % 0.5 0 - 1 %    Segs Absolute 9.0 K/CU MM    Lymphocytes Absolute 1.8 K/CU MM    Monocytes Absolute 0.7 K/CU MM    Eosinophils Absolute 0.1 K/CU MM    Basophils Absolute 0.1 K/CU MM    Nucleated RBC % 0.0 %    Total Nucleated RBC 0.0 K/CU MM    Total Immature Neutrophil 0.05 K/CU MM    Immature Neutrophil % 0.4 0 - 0.43 %   Lipase   Result Value Ref Range    Lipase 22 13 - 60 IU/L   Urinalysis with Microscopic   Result Value Ref Range    Color, UA YELLOW YELLOW    Clarity, UA HAZY (A) CLEAR    Glucose, Urine NEGATIVE NEGATIVE MG/DL    Bilirubin Urine NEGATIVE NEGATIVE MG/DL    Ketones, Urine LARGE (A) NEGATIVE MG/DL    Specific Gravity, UA 1.025 1.001 - 1.035    Blood, Urine SMALL (A) NEGATIVE    pH, Urine 6.0 5.0 - 8.0    Protein, UA 30 (A) NEGATIVE MG/DL    Urobilinogen, Urine NEGATIVE 0.2 - 1.0 MG/DL    Nitrite Urine, Quantitative NEGATIVE NEGATIVE    Leukocyte Esterase, Urine MODERATE (A) NEGATIVE    RBC, UA 34 (H) 0 - 6 /HPF    WBC, UA 25 (H) 0 - 5 /HPF    Bacteria, UA RARE (A) NEGATIVE /HPF    Yeast, UA RARE /HPF    Squam Epithel, UA 3 /HPF    Mucus, UA FEW (A) NEGATIVE HPF    Trichomonas, UA NONE SEEN NONE SEEN /HPF   Pregnancy, Urine   Result Value Ref Range    Pregnancy, Urine NEGATIVE NEGATIVE    Specific Gravity, Urine 1.025 1.001 - 1.035    Interpretation HCG METHOD LIMITATIONS:       Radiographs (if obtained):  [] The following radiograph was interpreted by myself in the absence of a radiologist:  [x] Radiologist's Report Reviewed:    EKG (if obtained): (All EKG's are interpreted by myself in the absence of a cardiologist)  12 lead EKG as interpreted by me reveals sinus rhythm. Axis is normal. There are no ischemic ST elevations or other suspicious ST changes, but does demonstrate nonspecific ST & T wave abnormality;  QRS interval is narrow, QT interval is not prolonged.  Final Interpretation: Sinus rhythm with nonspecific ST & T wave abnormality. MDM:  Plan of care is discussed thoroughly with the patient and family if present. If performed, all imaging and lab work also discussed with patient. All relevant prior results and chart reviewed if available. Patient presents as above. She is in no acute distress. Vital signs are normal.  She has some right lower quadrant tenderness. Patient previously has had appendectomy. She has had a right oophorectomy. Given Toradol, Zofran, morphine for symptoms. CT ordered for further evaluation. Suspect possible renal colic. Patient's urinalysis is suspicious for infection and CT is showing a large mid ureteral stone on the right. Patient started on Rocephin, kept n.p.o. and urine culture is sent. Urology was contacted. Dr. Sueanne Goodpasture agrees with the plan of care to admit the patient for surgical intervention and patient admitted to the hospitalist for further management. Clinical Impression:  1. Acute pyelonephritis    2.  Ureterolithiasis      (Please note that portions of this note may have been completed with a voice recognition program. Efforts were made to edit the dictations but occasionally words are mis-transcribed.)    MD Xochilt Sifuentes MD  08/15/21 [address], [place]  08/15/21 0752

## 2021-08-15 NOTE — PROGRESS NOTES
Skin assessment complete with Kirk Martel RN. Skin is warm, dry and intact; no open wounds or pressure areas noted. Will continue to assess and monitor.

## 2021-08-15 NOTE — ANESTHESIA PRE PROCEDURE
Department of Anesthesiology  Preprocedure Note       Name:  Brenda Correia   Age:  42 y.o.  :  1980                                          MRN:  [id number]         Date:  8/15/2021      Surgeon: Mario Mendoza):  Leigh Hedrick MD    Procedure: Procedure(s):  CYSTOSCOPY RETROGRADE PYELOGRAM STENT INSERTION    Medications prior to admission:   Prior to Admission medications    Not on File       Current medications:    Current Facility-Administered Medications   Medication Dose Route Frequency Provider Last Rate Last Admin    sodium chloride flush 0.9 % injection 5-40 mL  5-40 mL Intravenous BID [place], DO        sertraline (ZOLOFT) tablet 50 mg  50 mg Oral Daily [place], DO        0.9 % sodium chloride infusion   Intravenous Continuous [place],  mL/hr at 08/15/21 0830 New Bag at 08/15/21 0830    sodium chloride flush 0.9 % injection 5-40 mL  5-40 mL Intravenous 2 times per day [place], DO        sodium chloride flush 0.9 % injection 5-40 mL  5-40 mL Intravenous PRN [place], DO        0.9 % sodium chloride infusion  25 mL Intravenous PRN [place], DO        acetaminophen (TYLENOL) tablet 650 mg  650 mg Oral Q4H PRN [place], DO        polyethylene glycol (GLYCOLAX) packet 17 g  17 g Oral Daily PRN [place],         [START ON 2021] cefTRIAXone (ROCEPHIN) 1000 mg IVPB in 50 mL D5W minibag  1,000 mg Intravenous Q24H [place], DO        morphine (PF) injection 2 mg  2 mg Intravenous Q2H PRN [place], DO        prochlorperazine (COMPAZINE) injection 10 mg  10 mg Intravenous Q6H PRN [place],            Allergies:     Allergies   Allergen Reactions    Fentanyl Itching       Problem List:    Patient Active Problem List   Diagnosis Code    High-risk pregnancy O09.90    Morbid obesity ([address] E66.01    Advanced maternal age (AMA) in pregnancy [id number]    41 weeks gestation of pregnancy Z3A.41    Patella-femoral syndrome M22.2X9    Iron deficiency anemia D50.9    Vitamin D deficiency E55.9    Knee pain, chronic M25.569, G89.29    Oral herpes simplex infection B00.2    Patellar subluxation S83.003A    Knee MCL sprain S83.419A    Knee LCL sprain S83.429A    Anxiety F41.9    Obesity, morbid, BMI 50 or higher ([facility]) E66.01    Left knee pain M25.562    Chronic pain of both knees M25.561, M25.562, G89.29    Hydronephrosis with urinary obstruction due to ureteral calculus N13.2       Past Medical History:  History reviewed. No pertinent past medical history. Past Surgical History:        Procedure Laterality Date     SECTION         Social History:    Social History     Tobacco Use    Smoking status: Never Smoker    Smokeless tobacco: Never Used   Substance Use Topics    Alcohol use: No                                Counseling given: Not Answered      Vital Signs (Current):   Vitals:    08/15/21 0632 08/15/21 0731 08/15/21 0825 08/15/21 0920   BP: 130/72 123/76 125/78 131/69   Pulse: 75 70 67 68   Resp:   16 16   Temp:   97.8 °F (36.6 °C) 97.8 °F (36.6 °C)   TempSrc:   Oral Temporal   SpO2: 96% 97% 98% 100%   Weight:       Height:                                                  BP Readings from Last 3 Encounters:   08/15/21 131/69   19 124/84   18 126/85       NPO Status: Time of last liquid consumption:                         Time of last solid consumption:                         Date of last liquid consumption: 21                        Date of last solid food consumption: 21    BMI:   Wt Readings from Last 3 Encounters:   21 (!) 320 lb (145.2 kg)   19 (!) 333 lb (151 kg)   18 (!) 336 lb (152.4 kg)     Body mass index is 50.12 kg/m².     CBC:   Lab Results   Component Value Date    WBC 11.5 2021    RBC 4.56 2021    HGB 12.8 2021    HCT 40.1 2021    MCV 87.9 2021    RDW 12.6 2021     2021       CMP: Lab Results   Component Value Date     08/14/2021    K 3.9 08/14/2021     08/14/2021    CO2 26 08/14/2021    BUN 11 08/14/2021    CREATININE 0.8 08/14/2021    GFRAA >60 08/14/2021    AGRATIO 1.8 01/16/2018    LABGLOM >60 08/14/2021    GLUCOSE 118 08/14/2021    PROT 7.0 08/14/2021    CALCIUM 9.1 08/14/2021    BILITOT 0.5 08/14/2021    ALKPHOS 80 08/14/2021    AST 21 08/14/2021    ALT 30 08/14/2021       POC Tests: No results for input(s): POCGLU, POCNA, POCK, POCCL, POCBUN, POCHEMO, POCHCT in the last 72 hours. Coags:   Lab Results   Component Value Date    PROTIME 12.7 08/15/2021    INR 0.98 08/15/2021    APTT 27.1 08/15/2021       HCG (If Applicable):   Lab Results   Component Value Date    PREGTESTUR NEGATIVE 08/15/2021        ABGs: No results found for: PHART, PO2ART, NUK6FMA, YNT3JEO, BEART, O6GUSJRC     Type & Screen (If Applicable):  No results found for: LABABO, LABRH    Drug/Infectious Status (If Applicable):  No results found for: HIV, HEPCAB    COVID-19 Screening (If Applicable): No results found for: COVID19        Anesthesia Evaluation  Patient summary reviewed and Nursing notes reviewed no history of anesthetic complications:   Airway: Mallampati: I  TM distance: >3 FB   Neck ROM: full  Mouth opening: > = 3 FB Dental: normal exam     Comment: Good dental repair    Pulmonary:normal exam        (-) not a current smoker                           Cardiovascular:  Exercise tolerance: good (>4 METS),           Rhythm: regular  Rate: normal           Beta Blocker:  Not on Beta Blocker         Neuro/Psych:               GI/Hepatic/Renal:   (+) renal disease: kidney stones, morbid obesity          Endo/Other:                     Abdominal:             Vascular: Other Findings:             Anesthesia Plan      MAC     ASA 3       Induction: intravenous. Anesthetic plan and risks discussed with patient. Plan discussed with CRNA.                   Mayi Gan MD

## 2021-08-15 NOTE — ED TRIAGE NOTES
Pt from home states RLQ abd pain, sharp, sudden onset while eating. Worsening, not resolved at all tried to eat more or go to bathroom without relief.

## 2021-08-16 VITALS
HEIGHT: 67 IN | WEIGHT: 293 LBS | SYSTOLIC BLOOD PRESSURE: 142 MMHG | HEART RATE: 79 BPM | OXYGEN SATURATION: 97 % | RESPIRATION RATE: 16 BRPM | BODY MASS INDEX: 45.99 KG/M2 | DIASTOLIC BLOOD PRESSURE: 92 MMHG | TEMPERATURE: 98.4 F

## 2021-08-16 PROBLEM — N13.2 HYDRONEPHROSIS WITH URINARY OBSTRUCTION DUE TO URETERAL CALCULUS: Status: RESOLVED | Noted: 2021-08-15 | Resolved: 2021-08-16

## 2021-08-16 LAB
ANION GAP SERPL CALCULATED.3IONS-SCNC: 8 MMOL/L (ref 4–16)
BUN BLDV-MCNC: 9 MG/DL (ref 6–23)
CALCIUM SERPL-MCNC: 8.2 MG/DL (ref 8.3–10.6)
CHLORIDE BLD-SCNC: 107 MMOL/L (ref 99–110)
CO2: 25 MMOL/L (ref 21–32)
CREAT SERPL-MCNC: 0.6 MG/DL (ref 0.6–1.1)
GFR AFRICAN AMERICAN: >60 ML/MIN/1.73M2
GFR NON-AFRICAN AMERICAN: >60 ML/MIN/1.73M2
GLUCOSE BLD-MCNC: 95 MG/DL (ref 70–99)
HCT VFR BLD CALC: 35.3 % (ref 37–47)
HEMOGLOBIN: 11.2 GM/DL (ref 12.5–16)
MCH RBC QN AUTO: 27.9 PG (ref 27–31)
MCHC RBC AUTO-ENTMCNC: 31.7 % (ref 32–36)
MCV RBC AUTO: 88 FL (ref 78–100)
PDW BLD-RTO: 12.7 % (ref 11.7–14.9)
PLATELET # BLD: 177 K/CU MM (ref 140–440)
PMV BLD AUTO: 9.8 FL (ref 7.5–11.1)
POTASSIUM SERPL-SCNC: 4 MMOL/L (ref 3.5–5.1)
RBC # BLD: 4.01 M/CU MM (ref 4.2–5.4)
SODIUM BLD-SCNC: 140 MMOL/L (ref 135–145)
WBC # BLD: 8.6 K/CU MM (ref 4–10.5)

## 2021-08-16 PROCEDURE — 80048 BASIC METABOLIC PNL TOTAL CA: CPT

## 2021-08-16 PROCEDURE — 85027 COMPLETE CBC AUTOMATED: CPT

## 2021-08-16 PROCEDURE — 6370000000 HC RX 637 (ALT 250 FOR IP): Performed by: SPECIALIST

## 2021-08-16 PROCEDURE — 94761 N-INVAS EAR/PLS OXIMETRY MLT: CPT

## 2021-08-16 PROCEDURE — 6360000002 HC RX W HCPCS: Performed by: SPECIALIST

## 2021-08-16 PROCEDURE — 2580000003 HC RX 258: Performed by: SPECIALIST

## 2021-08-16 PROCEDURE — 36415 COLL VENOUS BLD VENIPUNCTURE: CPT

## 2021-08-16 RX ORDER — CIPROFLOXACIN 500 MG/1
500 TABLET, FILM COATED ORAL 2 TIMES DAILY
Qty: 14 TABLET | Refills: 0 | Status: SHIPPED | OUTPATIENT
Start: 2021-08-16 | End: 2021-08-23

## 2021-08-16 RX ADMIN — SERTRALINE HYDROCHLORIDE 50 MG: 50 TABLET ORAL at 09:03

## 2021-08-16 RX ADMIN — CEFTRIAXONE 1000 MG: 1 INJECTION, POWDER, FOR SOLUTION INTRAMUSCULAR; INTRAVENOUS at 05:19

## 2021-08-16 ASSESSMENT — PAIN SCALES - GENERAL
PAINLEVEL_OUTOF10: 0
PAINLEVEL_OUTOF10: 0

## 2021-08-16 NOTE — DISCHARGE SUMMARY
Discharge Summary           Name:  Gilda Yoon /Age/Sex: 1980  (42 y.o. female)   MRN & CSN:  [id number] & [id number] Admission Date/Time: 8/15/2021 12:44 AM   Attending:  Vinicio Espinoza MD Discharging Physician: Vinicio Espinoza MD     Hospital Course:   Gilda Yoon is a 42 y.o.  female  who presents with Hydronephrosis with urinary obstruction due to ureteral calculus. Patient was admitted and went to the OR from the ED; patient underwent cystoscopy with right ureteral stent placement without complication. Patient progressed well and was discharged on the first hospital day. Urine Culture grew E coli but patient did not want to stay for sensitivity results so patient was discharged with one week of Ciprofloxacin and close f/u with Urology as outpatient. Patient was discharged in stable condition. Right Hydronephrosis 2/2 Ureteral Calculus  UTI  - Discharged with Cipro for one week; patient did not want to stay for sensitivities  - f/u Urology in 1 week    Anxiety  - Continue Zoloft    Obesity  - Lifestyle and dietary modifications      The patient expressed appropriate understanding of and agreement with the discharge recommendations, medications, and plan.      Consults this admission:  IP CONSULT TO [facility] TO HOSPITALIST    Discharge Instruction:   Follow up appointments: Urology  Primary care physician:  within 2 weeks    Diet:  regular diet   Activity: activity as tolerated  Disposition: Discharged to:   [x]Home, []C, []SNF, []Acute Rehab, []Hospice   Condition on discharge: Stable    Discharge Medications:      Javi Southward \"Janelle\"   Home Medication Instructions [id number]    Printed on:21 1140   Medication Information                      ciprofloxacin (CIPRO) 500 MG tablet  Take 1 tablet by mouth 2 times daily for 7 days                 Objective Findings at Discharge:   BP (!) 142/92   Pulse 79   Temp 98.4 °F (36.9 °C) (Oral)   Resp 16   Ht 5' 7\" (1.702 m)   Wt (!) 320 lb (145.2 kg)   SpO2 97%   BMI 50.12 kg/m²            PHYSICAL EXAM   GEN Awake female, sitting upright in bed in no apparent distress. Appears given age. EYES Pupils are equally round. No scleral erythema, discharge, or conjunctivitis. HENT Mucous membranes are moist.   NECK Supple, no apparent thyromegaly or masses. RESP Clear to auscultation, no wheezes, rales or rhonchi. Symmetric chest movement while on room air. CARDIO/VASC S1/S2 auscultated. Regular rate without appreciable murmurs, rubs, or gallops. GI Abdomen is soft without significant tenderness, masses, or guarding    No costovertebral angle tenderness  HEME/LYMPH No petechiae or ecchymoses. MSK No gross joint deformities. SKIN Normal coloration, warm, dry. NEURO Cranial nerves appear grossly intact, normal speech  PSYCH Awake, alert, oriented x 4. Affect appropriate. BMP/CBC  Recent Labs     08/14/21  2348 08/16/21  0644    140   K 3.9 4.0    107   CO2 26 25   BUN 11 9   CREATININE 0.8 0.6   WBC 11.5* 8.6   HCT 40.1 35.3*    177       IMAGING:  CT A/P:  1. Mild-to-moderate right hydronephrosis and hydroureter secondary to a 10 mm   stone in the distal ureter. 2. Nonobstructing right nephrolithiasis. 3. Status post appendectomy and hysterectomy.        Discharge Time of 35 minutes    Electronically signed by Lake Castillo MD on 8/16/2021 at 11:40 AM

## 2021-08-16 NOTE — PLAN OF CARE
Problem: Pain:  Goal: Pain level will decrease  Description: Pain level will decrease  Outcome: Completed  Goal: Control of acute pain  Description: Control of acute pain  Outcome: Completed  Goal: Control of chronic pain  Description: Control of chronic pain  Outcome: Completed     Problem: Sensory:  Goal: General experience of comfort will improve  Description: General experience of comfort will improve  Outcome: Completed     Problem: Urinary Elimination:  Goal: Signs and symptoms of infection will decrease  Description: Signs and symptoms of infection will decrease  Outcome: Completed  Goal: Ability to reestablish a normal urinary elimination pattern will improve - after catheter removal  Description: Ability to reestablish a normal urinary elimination pattern will improve  Outcome: Completed  Goal: Complications related to the disease process, condition or treatment will be avoided or minimized  Description: Complications related to the disease process, condition or treatment will be avoided or minimized  Outcome: Completed     Problem: Activity:  Goal: Ability to return to normal activity level will improve  Description: Ability to return to normal activity level will improve  Outcome: Completed     Problem:  Bowel/Gastric:  Goal: Gastrointestinal status for postoperative course will improve  Description: Gastrointestinal status for postoperative course will improve  Outcome: Completed     Problem: Health Behavior:  Goal: Identification of resources available to assist in meeting health care needs will improve  Description: Identification of resources available to assist in meeting health care needs will improve  Outcome: Completed     Problem: Physical Regulation:  Goal: Postoperative complications will be avoided or minimized  Description: Postoperative complications will be avoided or minimized  Outcome: Completed     Problem: Respiratory:  Goal: Ability to achieve and maintain a regular respiratory rate will improve  Description: Ability to achieve and maintain a regular respiratory rate will improve  Outcome: Completed     Problem: Safety:  Goal: Ability to remain free from injury will improve  Description: Ability to remain free from injury will improve  Outcome: Completed     Problem: Skin Integrity:  Goal: Demonstration of wound healing without infection will improve  Description: Demonstration of wound healing without infection will improve  Outcome: Completed

## 2021-08-16 NOTE — PROGRESS NOTES
ESCHERICHIA COLI >100,000 CFU/ml Sensitivity to follow     Imaging:  CT ABDOMEN PELVIS W IV CONTRAST Additional Contrast? None    Result Date: 8/15/2021  EXAMINATION: CT OF THE ABDOMEN AND PELVIS WITH CONTRAST 8/15/2021 5:09 am TECHNIQUE: CT of the abdomen and pelvis was performed with the administration of intravenous contrast. Multiplanar reformatted images are provided for review. Dose modulation, iterative reconstruction, and/or weight based adjustment of the mA/kV was utilized to reduce the radiation dose to as low as reasonably achievable. COMPARISON: December 16, 2016 HISTORY: ORDERING SYSTEM PROVIDED HISTORY: rlq pain TECHNOLOGIST PROVIDED HISTORY: Reason for exam:->rlq pain Additional Contrast?->None Decision Support Exception - unselect if not a suspected or confirmed emergency medical condition->Emergency Medical Condition (MA) Reason for Exam: RLQ PAIN Acuity: Acute Type of Exam: Initial FINDINGS: Lower Chest: Clear lung bases. Organs: The liver, gallbladder, spleen, pancreas, and adrenal glands demonstrate no acute abnormality. The left kidney and collecting system are normal. Mild hypoenhancement of the right kidney is seen relative to the left. Mild perinephric stranding is present. Nonobstructing stone is seen in the lower pole measuring up to 6 mm. The right collecting system demonstrates mild to moderate hydronephrosis and hydroureter secondary to a 10 mm stone in the distal ureter. GI/Bowel: The stomach, small bowel, and colon are normal in course and caliber without evidence of wall thickening or obstruction. Status post appendectomy. Pelvis: Normal bladder. Status post hysterectomy. No adnexal masses. Peritoneum/Retroperitoneum: No free fluid or free air. No pathologic lymphadenopathy. Aorta and its branches are patent and normal in course and caliber. No significant atherosclerosis. Bones/Soft Tissues: No acute or aggressive osseous lesion.   Grade 1 anterolisthesis of L5 on S1 is seen secondary to bilateral L5 pars defects. Severe degenerative changes are identified. 1. Mild-to-moderate right hydronephrosis and hydroureter secondary to a 10 mm stone in the distal ureter. 2. Nonobstructing right nephrolithiasis. 3. Status post appendectomy and hysterectomy. FL LESS THAN 1 HOUR    Result Date: 8/15/2021  Radiology exam is complete. No Radiologist dictation. Please follow up with ordering provider. Assessment & Plan:      Gilda Yoon is a 42y.o. year old female admitted 8/15/2021 for ureteral calculus. 1) Right Ureteral Calculus   POD #1: Cystoscopy, right retrograde pyelogram, right ureteral stent placement. CT a/p w contrast 8/15/21; Mild-to-moderate right hydronephrosis and hydroureter secondary to a 10 mm stone in the distal ureter. Nonobstructing right nephrolithiasis. Cr 0.6   Pt will need right ureteroscopy/stone manipulation as outpatient once her urine is clear of infection, likely in 2-3 weeks. 2) UTI   Urine cx +e.coli >100,000, sensitivities pending   WBC 8.6, afebrile   On IV Rocephin; recommend discharge on appropriate oral abx x7 days    Pt stable from a  standpoint. Will sign off, please call with any questions. Pt to follow up in our office in 1 week for urine check and scheduling of next procedure. Patient seen and examined, chart reviewed.      Electronically signed by Sarath Merchant PA-C on 8/16/2021 at 8:18 AM

## 2021-08-17 LAB
CULTURE: ABNORMAL
CULTURE: ABNORMAL
Lab: ABNORMAL
SPECIMEN: ABNORMAL

## 2021-09-10 NOTE — PROGRESS NOTES
9/10/21 - . LM concerning  surgery on 9/16/21- have your covid-19 test performed within 2-7 days of your procedure, then quarantine yourself until after your procedure. Please call the PAT Nurse.

## 2021-09-13 ENCOUNTER — HOSPITAL ENCOUNTER (OUTPATIENT)
Dept: LAB | Age: 41
Discharge: HOME | End: 2021-09-13
Payer: COMMERCIAL

## 2021-09-13 PROCEDURE — U0005 INFEC AGEN DETEC AMPLI PROBE: HCPCS

## 2021-09-13 PROCEDURE — U0003 INFECTIOUS AGENT DETECTION BY NUCLEIC ACID (DNA OR RNA); SEVERE ACUTE RESPIRATORY SYNDROME CORONAVIRUS 2 (SARS-COV-2) (CORONAVIRUS DISEASE [COVID-19]), AMPLIFIED PROBE TECHNIQUE, MAKING USE OF HIGH THROUGHPUT TECHNOLOGIES AS DESCRIBED BY CMS-2020-01-R: HCPCS

## 2021-09-13 NOTE — PROGRESS NOTES
9/13/21 - . LM concerning  surgery on 9/16/21 - have your covid-19 test performed within 2-7 days of your procedure, then quarantine yourself until after your procedure. Please call the PAT Nurse.

## 2021-09-14 LAB
SARS-COV-2: NOT DETECTED
SOURCE: NORMAL

## 2021-09-14 RX ORDER — PROMETHAZINE HYDROCHLORIDE 25 MG/1
TABLET ORAL
Status: ON HOLD | COMMUNITY
Start: 2021-09-08 | End: 2021-10-01 | Stop reason: ALTCHOICE

## 2021-09-14 RX ORDER — CIPROFLOXACIN 500 MG/1
TABLET, FILM COATED ORAL
Status: ON HOLD | COMMUNITY
Start: 2021-08-28 | End: 2021-10-01 | Stop reason: ALTCHOICE

## 2021-09-14 NOTE — PROGRESS NOTES
.Surgery  9/15/21  you will be called  9/15/21    with times               1. Do not eat or drink anything after midnight - unless instructed by your doctor prior to surgery. This includes                   no water, chewing gum or mints. 2. Follow your directions as prescribed by the doctor for your procedure and medications. 3. Check with your Doctor regarding stopping Plavix, Coumadin, Lovenox,Effient,Pradaxa,Xarelto, Fragmin or other blood thinners and                   follow their instructions. Do not take any medication the day of surgery   4. Do not smoke, and do not drink any alcoholic beverages 24 hours prior to surgery. This includes NA Beer. 5. You may brush your teeth and gargle the morning of surgery. DO NOT SWALLOW WATER   6. You MUST make arrangements for a responsible adult to take you home after your surgery and be able to check on you every couple                   hours for the day. You will not be allowed to leave alone or drive yourself home. It is strongly suggested someone stay with you the first 24                   hrs. Your surgery will be cancelled if you do not have a ride home. 7. Please wear simple, loose fitting clothing to the hospital.  Dione Chatsworth not bring valuables (money, credit cards, checkbooks, etc.) Do not wear any                   makeup (including no eye makeup) or nail polish on your fingers or toes. 8. DO NOT wear any jewelry or piercings on day of surgery. All body piercing jewelry must be removed. 9. If you have dentures, they will be removed before going to the OR; we will provide you a container. If you wear contact lenses or glasses,                  they will be removed; please bring a case for them. 10. If you  have a Living Will and Durable Power of  for Healthcare, please bring in a copy.            11. Please bring picture ID,  insurance card, paperwork from the doctors office    (H & P, Consent, & card for implantable devices). 12. Take a shower the night before or morning of your procedure, do not apply any lotion, oil or powder. 13. Wear a mask covering your nose & mouth when entering the hospital. Have your covid-19 test performed within 2-7 days of your                  surgery. Quarantine yourself after the test until after your surgery.   Covid test done 9/13/21, results pending

## 2021-09-23 NOTE — PROGRESS NOTES
Spoke with patient to let her know she will be called with an arrival time the day before her surgery and to see if she had any questions, she will come in on Monday to get her covid test done.

## 2021-09-27 ENCOUNTER — HOSPITAL ENCOUNTER (OUTPATIENT)
Age: 41
Discharge: HOME | End: 2021-09-27
Payer: COMMERCIAL

## 2021-09-27 PROCEDURE — U0005 INFEC AGEN DETEC AMPLI PROBE: HCPCS

## 2021-09-27 PROCEDURE — U0003 INFECTIOUS AGENT DETECTION BY NUCLEIC ACID (DNA OR RNA); SEVERE ACUTE RESPIRATORY SYNDROME CORONAVIRUS 2 (SARS-COV-2) (CORONAVIRUS DISEASE [COVID-19]), AMPLIFIED PROBE TECHNIQUE, MAKING USE OF HIGH THROUGHPUT TECHNOLOGIES AS DESCRIBED BY CMS-2020-01-R: HCPCS

## 2021-09-28 LAB
SARS-COV-2: NOT DETECTED
SOURCE: NORMAL

## 2021-09-30 ENCOUNTER — ANESTHESIA EVENT (OUTPATIENT)
Dept: OPERATING ROOM | Age: 41
End: 2021-09-30
Payer: COMMERCIAL

## 2021-09-30 NOTE — ANESTHESIA PRE PROCEDURE
Department of Anesthesiology  Preprocedure Note       Name:  Jeremy Peña   Age:  42 y.o.  :  1980                                          MRN:  [id number]         Date:  2021      Surgeon: Saira Wharton):  Denis Quesada MD    Procedure: Procedure(s):  RIGHT CYSTOSCOPY URETEROSCOPY RETROGRADE PYELOGRAMS    Medications prior to admission:   Prior to Admission medications    Medication Sig Start Date End Date Taking? Authorizing Provider   ciprofloxacin (CIPRO) 500 MG tablet TAKE 1 TABLET BY MOUTH EVERY 12 HOURS 21   Historical Provider, MD   promethazine (PHENERGAN) 25 MG tablet TAKE 1 TABLET BY MOUTH EVERY 4 TO 6 HOURS AS NEEDED 21   Historical Provider, MD       Current medications:    No current facility-administered medications for this encounter. Current Outpatient Medications   Medication Sig Dispense Refill    ciprofloxacin (CIPRO) 500 MG tablet TAKE 1 TABLET BY MOUTH EVERY 12 HOURS      promethazine (PHENERGAN) 25 MG tablet TAKE 1 TABLET BY MOUTH EVERY 4 TO 6 HOURS AS NEEDED         Allergies:     Allergies   Allergen Reactions    Fentanyl Itching       Problem List:    Patient Active Problem List   Diagnosis Code    High-risk pregnancy O09.90    Morbid obesity ([address] E66.01    Advanced maternal age (AMA) in pregnancy [id number]    41 weeks gestation of pregnancy Z3A.41    Patella-femoral syndrome M22.2X9    Iron deficiency anemia D50.9    Vitamin D deficiency E55.9    Knee pain, chronic M25.569, G89.29    Oral herpes simplex infection B00.2    Patellar subluxation S83.003A    Knee MCL sprain S83.419A    Knee LCL sprain S83.429A    Anxiety F41.9    Obesity, morbid, BMI 50 or higher ([address] E66.01    Left knee pain M25.562    Chronic pain of both knees M25.561, M25.562, G89.29       Past Medical History:        Diagnosis Date    UTI (urinary tract infection)        Past Surgical History:        Procedure Laterality Date    APPENDECTOMY  06/10/2020   [facility] BLADDER SURGERY N/A 8/15/2021    CYSTOSCOPY RETROGRADE PYELOGRAM STENT INSERTION performed by Ute Brantley MD at [address]  06/10/2020       Social History:    Social History     Tobacco Use    Smoking status: Never Smoker    Smokeless tobacco: Never Used   Substance Use Topics    Alcohol use: No                                Counseling given: Not Answered      Vital Signs (Current):   Vitals:    09/14/21 1541   Weight: (!) 318 lb (144.2 kg)   Height: 5' 7\" (1.702 m)                                              BP Readings from Last 3 Encounters:   08/16/21 (!) 142/92   08/15/21 (!) 133/90   12/13/19 124/84       NPO Status:                                                                                 BMI:   Wt Readings from Last 3 Encounters:   08/14/21 (!) 320 lb (145.2 kg)   12/13/19 (!) 333 lb (151 kg)   08/07/18 (!) 336 lb (152.4 kg)     Body mass index is 49.81 kg/m². CBC:   Lab Results   Component Value Date    WBC 8.6 08/16/2021    RBC 4.01 08/16/2021    HGB 11.2 08/16/2021    HCT 35.3 08/16/2021    MCV 88.0 08/16/2021    RDW 12.7 08/16/2021     08/16/2021       CMP:   Lab Results   Component Value Date     08/16/2021    K 4.0 08/16/2021     08/16/2021    CO2 25 08/16/2021    BUN 9 08/16/2021    CREATININE 0.6 08/16/2021    GFRAA >60 08/16/2021    AGRATIO 1.8 01/16/2018    LABGLOM >60 08/16/2021    GLUCOSE 95 08/16/2021    PROT 7.0 08/14/2021    CALCIUM 8.2 08/16/2021    BILITOT 0.5 08/14/2021    ALKPHOS 80 08/14/2021    AST 21 08/14/2021    ALT 30 08/14/2021       POC Tests: No results for input(s): POCGLU, POCNA, POCK, POCCL, POCBUN, POCHEMO, POCHCT in the last 72 hours.     Coags:   Lab Results   Component Value Date    PROTIME 12.7 08/15/2021    INR 0.98 08/15/2021    APTT 27.1 08/15/2021       HCG (If Applicable):   Lab Results   Component Value Date    PREGTESTUR NEGATIVE 08/15/2021        ABGs: No results found for: PHART, PO2ART, NHP7LXA, WAD3NIP, BEART, Y0ZFMARZ     Type & Screen (If Applicable):  No results found for: LABABO, LABRH    Drug/Infectious Status (If Applicable):  No results found for: HIV, HEPCAB    COVID-19 Screening (If Applicable):   Lab Results   Component Value Date    COVID19 NOT DETECTED 09/27/2021           Anesthesia Evaluation    Airway: Mallampati: II  TM distance: >3 FB   Neck ROM: full  Mouth opening: > = 3 FB Dental:          Pulmonary:normal exam                               Cardiovascular:Negative CV ROS                      Neuro/Psych:   (+) depression/anxiety             GI/Hepatic/Renal: Neg GI/Hepatic/Renal ROS            Endo/Other: Negative Endo/Other ROS                    Abdominal:   (+) obese,     Abdomen: soft. Vascular: Other Findings:           Anesthesia Plan      general     ASA 3     (Chart review)  Induction: intravenous. Anesthetic plan and risks discussed with patient. Use of blood products discussed with patient whom.    Plan discussed with CRNA and surgical team.                IRIS Berman - CRNA   9/30/2021

## 2021-10-01 ENCOUNTER — APPOINTMENT (OUTPATIENT)
Dept: GENERAL RADIOLOGY | Age: 41
End: 2021-10-01
Attending: UROLOGY
Payer: COMMERCIAL

## 2021-10-01 ENCOUNTER — ANESTHESIA (OUTPATIENT)
Dept: OPERATING ROOM | Age: 41
End: 2021-10-01
Payer: COMMERCIAL

## 2021-10-01 ENCOUNTER — HOSPITAL ENCOUNTER (OUTPATIENT)
Age: 41
Setting detail: OUTPATIENT SURGERY
Discharge: HOME | End: 2021-10-01
Attending: UROLOGY | Admitting: UROLOGY
Payer: COMMERCIAL

## 2021-10-01 VITALS
WEIGHT: 293 LBS | HEART RATE: 62 BPM | BODY MASS INDEX: 45.99 KG/M2 | OXYGEN SATURATION: 96 % | DIASTOLIC BLOOD PRESSURE: 65 MMHG | SYSTOLIC BLOOD PRESSURE: 124 MMHG | RESPIRATION RATE: 16 BRPM | HEIGHT: 67 IN | TEMPERATURE: 98 F

## 2021-10-01 VITALS
OXYGEN SATURATION: 100 % | DIASTOLIC BLOOD PRESSURE: 96 MMHG | RESPIRATION RATE: 7 BRPM | SYSTOLIC BLOOD PRESSURE: 123 MMHG

## 2021-10-01 DIAGNOSIS — N20.1 URETERAL STONE: Primary | ICD-10-CM

## 2021-10-01 DIAGNOSIS — G89.18 POST-OPERATIVE PAIN: ICD-10-CM

## 2021-10-01 PROCEDURE — 3600000013 HC SURGERY LEVEL 3 ADDTL 15MIN: Performed by: SPECIALIST

## 2021-10-01 PROCEDURE — C1769 GUIDE WIRE: HCPCS | Performed by: SPECIALIST

## 2021-10-01 PROCEDURE — C1758 CATHETER, URETERAL: HCPCS | Performed by: SPECIALIST

## 2021-10-01 PROCEDURE — 3700000000 HC ANESTHESIA ATTENDED CARE: Performed by: SPECIALIST

## 2021-10-01 PROCEDURE — 2580000003 HC RX 258: Performed by: UROLOGY

## 2021-10-01 PROCEDURE — 6360000002 HC RX W HCPCS

## 2021-10-01 PROCEDURE — 3600000003 HC SURGERY LEVEL 3 BASE: Performed by: SPECIALIST

## 2021-10-01 PROCEDURE — 7100000001 HC PACU RECOVERY - ADDTL 15 MIN: Performed by: SPECIALIST

## 2021-10-01 PROCEDURE — 7100000011 HC PHASE II RECOVERY - ADDTL 15 MIN: Performed by: SPECIALIST

## 2021-10-01 PROCEDURE — 6360000002 HC RX W HCPCS: Performed by: UROLOGY

## 2021-10-01 PROCEDURE — 2709999900 HC NON-CHARGEABLE SUPPLY: Performed by: SPECIALIST

## 2021-10-01 PROCEDURE — 2580000003 HC RX 258

## 2021-10-01 PROCEDURE — 2720000010 HC SURG SUPPLY STERILE: Performed by: SPECIALIST

## 2021-10-01 PROCEDURE — 7100000000 HC PACU RECOVERY - FIRST 15 MIN: Performed by: SPECIALIST

## 2021-10-01 PROCEDURE — 7100000010 HC PHASE II RECOVERY - FIRST 15 MIN: Performed by: SPECIALIST

## 2021-10-01 PROCEDURE — 76000 FLUOROSCOPY <1 HR PHYS/QHP: CPT

## 2021-10-01 PROCEDURE — 6360000002 HC RX W HCPCS: Performed by: ANESTHESIOLOGY

## 2021-10-01 PROCEDURE — C2617 STENT, NON-COR, TEM W/O DEL: HCPCS | Performed by: SPECIALIST

## 2021-10-01 PROCEDURE — 3700000001 HC ADD 15 MINUTES (ANESTHESIA): Performed by: SPECIALIST

## 2021-10-01 DEVICE — VARIABLE LENGTH URETERAL STENT
Type: IMPLANTABLE DEVICE | Site: URETER | Status: FUNCTIONAL
Brand: CONTOUR VL™

## 2021-10-01 RX ORDER — PROPOFOL 10 MG/ML
INJECTION, EMULSION INTRAVENOUS PRN
Status: DISCONTINUED | OUTPATIENT
Start: 2021-10-01 | End: 2021-10-01 | Stop reason: SDUPTHER

## 2021-10-01 RX ORDER — DEXAMETHASONE SODIUM PHOSPHATE 4 MG/ML
INJECTION, SOLUTION INTRA-ARTICULAR; INTRALESIONAL; INTRAMUSCULAR; INTRAVENOUS; SOFT TISSUE PRN
Status: DISCONTINUED | OUTPATIENT
Start: 2021-10-01 | End: 2021-10-01 | Stop reason: SDUPTHER

## 2021-10-01 RX ORDER — SODIUM CHLORIDE, SODIUM LACTATE, POTASSIUM CHLORIDE, CALCIUM CHLORIDE 600; 310; 30; 20 MG/100ML; MG/100ML; MG/100ML; MG/100ML
INJECTION, SOLUTION INTRAVENOUS CONTINUOUS PRN
Status: DISCONTINUED | OUTPATIENT
Start: 2021-10-01 | End: 2021-10-01 | Stop reason: SDUPTHER

## 2021-10-01 RX ORDER — HYDROCODONE BITARTRATE AND ACETAMINOPHEN 5; 325 MG/1; MG/1
1 TABLET ORAL EVERY 6 HOURS PRN
Qty: 20 TABLET | Refills: 0 | Status: SHIPPED | OUTPATIENT
Start: 2021-10-01 | End: 2021-10-04

## 2021-10-01 RX ORDER — LABETALOL HYDROCHLORIDE 5 MG/ML
5 INJECTION, SOLUTION INTRAVENOUS EVERY 10 MIN PRN
Status: DISCONTINUED | OUTPATIENT
Start: 2021-10-01 | End: 2021-10-01 | Stop reason: HOSPADM

## 2021-10-01 RX ORDER — SODIUM CHLORIDE, SODIUM LACTATE, POTASSIUM CHLORIDE, CALCIUM CHLORIDE 600; 310; 30; 20 MG/100ML; MG/100ML; MG/100ML; MG/100ML
INJECTION, SOLUTION INTRAVENOUS CONTINUOUS
Status: DISCONTINUED | OUTPATIENT
Start: 2021-10-01 | End: 2021-10-01 | Stop reason: HOSPADM

## 2021-10-01 RX ORDER — MORPHINE SULFATE 2 MG/ML
2 INJECTION, SOLUTION INTRAMUSCULAR; INTRAVENOUS EVERY 5 MIN PRN
Status: DISCONTINUED | OUTPATIENT
Start: 2021-10-01 | End: 2021-10-01 | Stop reason: HOSPADM

## 2021-10-01 RX ORDER — HYDROMORPHONE HCL 110MG/55ML
0.25 PATIENT CONTROLLED ANALGESIA SYRINGE INTRAVENOUS EVERY 5 MIN PRN
Status: DISCONTINUED | OUTPATIENT
Start: 2021-10-01 | End: 2021-10-01 | Stop reason: HOSPADM

## 2021-10-01 RX ORDER — MIDAZOLAM HYDROCHLORIDE 1 MG/ML
INJECTION INTRAMUSCULAR; INTRAVENOUS PRN
Status: DISCONTINUED | OUTPATIENT
Start: 2021-10-01 | End: 2021-10-01 | Stop reason: SDUPTHER

## 2021-10-01 RX ORDER — FENTANYL CITRATE 50 UG/ML
INJECTION, SOLUTION INTRAMUSCULAR; INTRAVENOUS PRN
Status: DISCONTINUED | OUTPATIENT
Start: 2021-10-01 | End: 2021-10-01 | Stop reason: SDUPTHER

## 2021-10-01 RX ORDER — HYDRALAZINE HYDROCHLORIDE 20 MG/ML
5 INJECTION INTRAMUSCULAR; INTRAVENOUS EVERY 10 MIN PRN
Status: DISCONTINUED | OUTPATIENT
Start: 2021-10-01 | End: 2021-10-01 | Stop reason: HOSPADM

## 2021-10-01 RX ORDER — ONDANSETRON 2 MG/ML
INJECTION INTRAMUSCULAR; INTRAVENOUS PRN
Status: DISCONTINUED | OUTPATIENT
Start: 2021-10-01 | End: 2021-10-01 | Stop reason: SDUPTHER

## 2021-10-01 RX ORDER — HYDROMORPHONE HCL 110MG/55ML
0.5 PATIENT CONTROLLED ANALGESIA SYRINGE INTRAVENOUS EVERY 5 MIN PRN
Status: DISCONTINUED | OUTPATIENT
Start: 2021-10-01 | End: 2021-10-01 | Stop reason: HOSPADM

## 2021-10-01 RX ORDER — PROMETHAZINE HYDROCHLORIDE 25 MG/ML
6.25 INJECTION, SOLUTION INTRAMUSCULAR; INTRAVENOUS
Status: DISCONTINUED | OUTPATIENT
Start: 2021-10-01 | End: 2021-10-01 | Stop reason: HOSPADM

## 2021-10-01 RX ORDER — LIDOCAINE HYDROCHLORIDE 20 MG/ML
INJECTION, SOLUTION INTRAVENOUS PRN
Status: DISCONTINUED | OUTPATIENT
Start: 2021-10-01 | End: 2021-10-01 | Stop reason: SDUPTHER

## 2021-10-01 RX ORDER — CIPROFLOXACIN 500 MG/1
500 TABLET, FILM COATED ORAL 2 TIMES DAILY
Qty: 10 TABLET | Refills: 0 | Status: SHIPPED | OUTPATIENT
Start: 2021-10-01 | End: 2021-10-11

## 2021-10-01 RX ADMIN — DEXAMETHASONE SODIUM PHOSPHATE 4 MG: 4 INJECTION, SOLUTION INTRAMUSCULAR; INTRAVENOUS at 14:45

## 2021-10-01 RX ADMIN — MIDAZOLAM 2 MG: 1 INJECTION INTRAMUSCULAR; INTRAVENOUS at 14:32

## 2021-10-01 RX ADMIN — LIDOCAINE HYDROCHLORIDE 100 MG: 20 INJECTION, SOLUTION INTRAVENOUS at 14:36

## 2021-10-01 RX ADMIN — FENTANYL CITRATE 50 MCG: 50 INJECTION, SOLUTION INTRAMUSCULAR; INTRAVENOUS at 14:36

## 2021-10-01 RX ADMIN — SODIUM CHLORIDE, POTASSIUM CHLORIDE, SODIUM LACTATE AND CALCIUM CHLORIDE: 600; 310; 30; 20 INJECTION, SOLUTION INTRAVENOUS at 14:32

## 2021-10-01 RX ADMIN — FENTANYL CITRATE 50 MCG: 50 INJECTION, SOLUTION INTRAMUSCULAR; INTRAVENOUS at 14:45

## 2021-10-01 RX ADMIN — CEFAZOLIN SODIUM 3000 MG: 1 INJECTION, POWDER, FOR SOLUTION INTRAMUSCULAR; INTRAVENOUS at 14:45

## 2021-10-01 RX ADMIN — HYDROMORPHONE HYDROCHLORIDE 0.5 MG: 2 INJECTION, SOLUTION INTRAMUSCULAR; INTRAVENOUS; SUBCUTANEOUS at 16:07

## 2021-10-01 RX ADMIN — PROPOFOL 50 MG: 10 INJECTION, EMULSION INTRAVENOUS at 14:40

## 2021-10-01 RX ADMIN — ONDANSETRON 4 MG: 2 INJECTION INTRAMUSCULAR; INTRAVENOUS at 14:45

## 2021-10-01 RX ADMIN — PROPOFOL 200 MG: 10 INJECTION, EMULSION INTRAVENOUS at 14:36

## 2021-10-01 RX ADMIN — HYDROMORPHONE HYDROCHLORIDE 0.5 MG: 2 INJECTION, SOLUTION INTRAMUSCULAR; INTRAVENOUS; SUBCUTANEOUS at 16:01

## 2021-10-01 ASSESSMENT — PULMONARY FUNCTION TESTS
PIF_VALUE: 32
PIF_VALUE: 5
PIF_VALUE: 4
PIF_VALUE: 3
PIF_VALUE: 10
PIF_VALUE: 4
PIF_VALUE: 3
PIF_VALUE: 3
PIF_VALUE: 4
PIF_VALUE: 3
PIF_VALUE: 4
PIF_VALUE: 3
PIF_VALUE: 4
PIF_VALUE: 1
PIF_VALUE: 3
PIF_VALUE: 4
PIF_VALUE: 3
PIF_VALUE: 0
PIF_VALUE: 4
PIF_VALUE: 3
PIF_VALUE: 1
PIF_VALUE: 3
PIF_VALUE: 3
PIF_VALUE: 10
PIF_VALUE: 3
PIF_VALUE: 4
PIF_VALUE: 9
PIF_VALUE: 0
PIF_VALUE: 0
PIF_VALUE: 6
PIF_VALUE: 1
PIF_VALUE: 6
PIF_VALUE: 2
PIF_VALUE: 3
PIF_VALUE: 0
PIF_VALUE: 3
PIF_VALUE: 17
PIF_VALUE: 3
PIF_VALUE: 0
PIF_VALUE: 2
PIF_VALUE: 3
PIF_VALUE: 28
PIF_VALUE: 3
PIF_VALUE: 2
PIF_VALUE: 3
PIF_VALUE: 3

## 2021-10-01 ASSESSMENT — PAIN DESCRIPTION - FREQUENCY
FREQUENCY: CONTINUOUS
FREQUENCY: INTERMITTENT

## 2021-10-01 ASSESSMENT — PAIN DESCRIPTION - ORIENTATION
ORIENTATION: RIGHT

## 2021-10-01 ASSESSMENT — PAIN DESCRIPTION - DESCRIPTORS
DESCRIPTORS: DISCOMFORT

## 2021-10-01 ASSESSMENT — PAIN SCALES - GENERAL
PAINLEVEL_OUTOF10: 3
PAINLEVEL_OUTOF10: 4
PAINLEVEL_OUTOF10: 3
PAINLEVEL_OUTOF10: 7
PAINLEVEL_OUTOF10: 7

## 2021-10-01 ASSESSMENT — PAIN DESCRIPTION - LOCATION
LOCATION: BACK

## 2021-10-01 ASSESSMENT — PAIN DESCRIPTION - PAIN TYPE
TYPE: SURGICAL PAIN

## 2021-10-01 ASSESSMENT — PAIN - FUNCTIONAL ASSESSMENT: PAIN_FUNCTIONAL_ASSESSMENT: 0-10

## 2021-10-01 NOTE — PROGRESS NOTES
1601: Medicated for right back discomfort. Much reassurance given. 1620: Dozing. 1634: Transported to [facility].

## 2021-10-01 NOTE — ANESTHESIA POSTPROCEDURE EVALUATION
Department of Anesthesiology  Postprocedure Note    Patient: Valerie Tadeo  MRN: [id number]  YOB: 1980  Date of evaluation: 10/1/2021  Time:  3:33 PM     Procedure Summary     Date: 10/01/21 Room / Location: [place] [address] / [facility]    Anesthesia Start: 7409 Anesthesia Stop: 7801    Procedure: RIGHT CYSTOSCOPY, RIGHT URETEROSCOPY STENT EXCHANGE, RETROGRADE PYELOGRAMS, LITHOTRIPSY, STONE EXTRACTION (Right Urethra) Diagnosis:       Ureteral stone      (Ureteral stone [N20.1])    Surgeons: Patricia Davis MD Responsible Provider: Mini Moe MD    Anesthesia Type: general ASA Status: 3          Anesthesia Type: general    Shelia Phase I:      Shelia Phase II:      Last vitals: Reviewed and per EMR flowsheets.        Anesthesia Post Evaluation    Patient location during evaluation: PACU  Patient participation: complete - patient participated  Level of consciousness: awake and alert  Pain score: 0  Airway patency: patent  Nausea & Vomiting: no nausea and no vomiting  Complications: no  Cardiovascular status: blood pressure returned to baseline  Respiratory status: acceptable, spontaneous ventilation and face mask

## 2021-10-01 NOTE — PROGRESS NOTES
26 Pt received from PACU and report received from [facility]. Pt given clifford crackers and pt brought her own drink. Call light in reach. 1700 In to check on pt. Pt denies needs. Call light in reach. 46 Pt states that she isn't ready to go home yet. Pt given more clifford crackers. Call light in reach. 1800 Pt called boyfriend to pick her up. Pt ready to get dressed to go home. Call light in reach. [phone] Pt discharged to home per wheelchair to husbands vehicle.

## 2021-10-01 NOTE — OP NOTE
[address] [place] [address], [address] [place] [address]                                OPERATIVE REPORT    PATIENT NAME: Ann Barrera                   :        1980  MED REC NO:   [id number]                          ROOM:  ACCOUNT NO:   [de-identified]                           ADMIT DATE: 10/01/2021  PROVIDER:     Ben Ellison MD    DATE OF PROCEDURE:  10/01/2021    PREOPERATIVE DIAGNOSIS:  Right ureteral calculus. POSTOPERATIVE DIAGNOSIS:  Right ureteral calculus. OPERATIONS PERFORMED:  Cystoscopy, right ureteral stent exchange, right  uteroscopy, laser lithotripsy, stone basket extraction. SURGEON:  Ben Ellison MD    ANESTHESIA:  General anesthesia. ESTIMATED BLOOD LOSS:  None. COMPLICATIONS:  None. BRIEF HISTORY:  This is a 70-year-old female with recent stent placement  secondary to 10 mm obstructing stone with UTI.  UTI is cleared. She is  here now for definitive treatment of her stone. DESCRIPTION OF PROCEDURE:  The patient identified in the holding area,  taken to the procedure room, and placed in a dorsal lithotomy position. The patient's genital area was prepped and draped in a sterile fashion. A 21-Uzbek cystoscope sheath was introduced per urethra under direct  visualization. Right ureteral stent was identified, grasped and removed  intact. The 10-mm stone could be seen just at the level of the iliac  vessels, right mid ureter. A sensor wire was passed up the right  ureter. Semi-rigid uteroscope was then passed all the way under direct  visualization to the right ureter and to the stone. Using a 365-micron  holmium laser fiber setting at 0.8 joules at a rate of 15, stone was  fragmented into multiple small pieces. A ZeroTip stone basket was used  to retrieve all the larger pieces. At this point, it was impacted.   It  did not move right at the level of the iliac vessels, so at this point,  we deemed it necessary to replace the stent. Ureteroscope was removed. Safety wire was placed. Cystoscope was reintroduced over the indwelling  safety wire. A 4.5-Lithuanian variable length double J ureteral stent was  passed over guidewire in a standard fashion. Position was confirmed  using fluoroscopy and cystoscopy and deemed good. The patient's bladder  was emptied, taken to the recovery room, having tolerated the procedure  well. DISCHARGE SUMMARY:  The patient was discharged from same-day surgery in  stable condition.   We will see her back in the office in 1 week for  cysto and stent removal.        Gamaliel Price MD    D: 10/01/2021 15:32:25       T: 10/01/2021 15:34:48     [email or web address]  Job#: [id number]     Doc#: [id number]    CC:

## 2021-10-01 NOTE — PROGRESS NOTES
1530 - transferred from OR on cart, monitor applied, alarms on and verified, bedside handoff provided by [facility] RN and [address] [place] [address] Way  8827 - handoff provided to [facility]

## 2021-10-28 ENCOUNTER — HOSPITAL ENCOUNTER (OUTPATIENT)
Dept: LAB | Age: 41
Discharge: HOME | End: 2021-10-28
Payer: COMMERCIAL

## 2021-10-28 PROCEDURE — U0003 INFECTIOUS AGENT DETECTION BY NUCLEIC ACID (DNA OR RNA); SEVERE ACUTE RESPIRATORY SYNDROME CORONAVIRUS 2 (SARS-COV-2) (CORONAVIRUS DISEASE [COVID-19]), AMPLIFIED PROBE TECHNIQUE, MAKING USE OF HIGH THROUGHPUT TECHNOLOGIES AS DESCRIBED BY CMS-2020-01-R: HCPCS

## 2021-10-28 PROCEDURE — U0005 INFEC AGEN DETEC AMPLI PROBE: HCPCS

## 2021-10-28 PROCEDURE — 36415 COLL VENOUS BLD VENIPUNCTURE: CPT

## 2021-10-28 NOTE — PROGRESS NOTES
I called Dr. Cecilia Guevara office and was transferred to the assistants voicemail. I left a message with patient's name, date of birth, and stated I have left multiple voicemail message on patients phone and one on patients mothers phone to have patient contact me to complete phone PAT assessment. I still have not heard back from patient. Also, patient will need to be tested for Covid a minimum of 2 days before surgery. I left my call back number. Addendum: Patient returned call and completed Phone PAT assessment. I called Dr. Yong Howard and left a voicemail on his assistance voicemail letting her know that the patient did return my call and completed the phone PAT assessment.

## 2021-10-29 LAB
SARS-COV-2: NOT DETECTED
SOURCE: NORMAL

## 2021-11-01 ENCOUNTER — ANESTHESIA EVENT (OUTPATIENT)
Dept: OPERATING ROOM | Age: 41
End: 2021-11-01
Payer: COMMERCIAL

## 2021-11-01 NOTE — ANESTHESIA PRE PROCEDURE
Department of Anesthesiology  Preprocedure Note       Name:  Rosanna Caldwell   Age:  42 y.o.  :  1980                                          MRN:  [id number]         Date:  2021      Surgeon: Jessie Mohs):  Иван Louise MD    Procedure: Procedure(s):  RIGHT CYSTOSCOPY STENT REMOVAL    Medications prior to admission:   Prior to Admission medications    Medication Sig Start Date End Date Taking? Authorizing Provider   NONFORMULARY Take 1 capsule by mouth Antibiotic- patient doesn't know name   Yes Historical Provider, MD       Current medications:    No current facility-administered medications for this encounter. Current Outpatient Medications   Medication Sig Dispense Refill    NONFORMULARY Take 1 capsule by mouth Antibiotic- patient doesn't know name         Allergies:     Allergies   Allergen Reactions    Fentanyl Itching       Problem List:    Patient Active Problem List   Diagnosis Code    High-risk pregnancy O09.90    Morbid obesity ([facility] [address] E66.01    Advanced maternal age (AMA) in pregnancy [id number]    41 weeks gestation of pregnancy Z3A.41    Patella-femoral syndrome M22.2X9    Iron deficiency anemia D50.9    Vitamin D deficiency E55.9    Knee pain, chronic M25.569, G89.29    Oral herpes simplex infection B00.2    Patellar subluxation S83.003A    Knee MCL sprain S83.419A    Knee LCL sprain S83.429A    Anxiety F41.9    Obesity, morbid, BMI 50 or higher ([facility] [address] [facility] E66.01    Left knee pain M25.562    Chronic pain of both knees M25.561, M25.562, G89.29       Past Medical History:        Diagnosis Date    Obesity     UTI (urinary tract infection)        Past Surgical History:        Procedure Laterality Date    APPENDECTOMY  06/10/2020    BLADDER SURGERY N/A 8/15/2021    CYSTOSCOPY RETROGRADE PYELOGRAM STENT INSERTION performed by Иван Louise MD at [address] [facility] [address]      CYSTOSCOPY Right 10/1/2021 RIGHT CYSTOSCOPY, RIGHT URETEROSCOPY STENT EXCHANGE, RETROGRADE PYELOGRAMS, LITHOTRIPSY, STONE EXTRACTION performed by Patricia Knight MD at [address]  06/10/2020       Social History:    Social History     Tobacco Use    Smoking status: Never Smoker    Smokeless tobacco: Never Used   Substance Use Topics    Alcohol use: No                                Counseling given: Not Answered      Vital Signs (Current):   Vitals:    10/28/21 1127   Weight: (!) 318 lb (144.2 kg)   Height: 5' 7\" (1.702 m)                                              BP Readings from Last 3 Encounters:   10/01/21 (!) 123/96   10/01/21 124/65   08/16/21 (!) 142/92       NPO Status:                                                                                 BMI:   Wt Readings from Last 3 Encounters:   10/01/21 (!) 318 lb (144.2 kg)   08/14/21 (!) 320 lb (145.2 kg)   12/13/19 (!) 333 lb (151 kg)     Body mass index is 49.81 kg/m². CBC:   Lab Results   Component Value Date    WBC 8.6 08/16/2021    RBC 4.01 08/16/2021    HGB 11.2 08/16/2021    HCT 35.3 08/16/2021    MCV 88.0 08/16/2021    RDW 12.7 08/16/2021     08/16/2021       CMP:   Lab Results   Component Value Date     08/16/2021    K 4.0 08/16/2021     08/16/2021    CO2 25 08/16/2021    BUN 9 08/16/2021    CREATININE 0.6 08/16/2021    GFRAA >60 08/16/2021    AGRATIO 1.8 01/16/2018    LABGLOM >60 08/16/2021    GLUCOSE 95 08/16/2021    PROT 7.0 08/14/2021    CALCIUM 8.2 08/16/2021    BILITOT 0.5 08/14/2021    ALKPHOS 80 08/14/2021    AST 21 08/14/2021    ALT 30 08/14/2021       POC Tests: No results for input(s): POCGLU, POCNA, POCK, POCCL, POCBUN, POCHEMO, POCHCT in the last 72 hours.     Coags:   Lab Results   Component Value Date    PROTIME 12.7 08/15/2021    INR 0.98 08/15/2021    APTT 27.1 08/15/2021       HCG (If Applicable):   Lab Results   Component Value Date    PREGTESTUR NEGATIVE 08/15/2021        ABGs: No results found for: PHART, PO2ART, PAH1BOJ, CEJ7VEL, BEART, I6EQIVQP     Type & Screen (If Applicable):  No results found for: LABABO, LABRH    Drug/Infectious Status (If Applicable):  No results found for: HIV, HEPCAB    COVID-19 Screening (If Applicable):   Lab Results   Component Value Date    COVID19 NOT DETECTED 10/28/2021           Anesthesia Evaluation  Patient summary reviewed  Airway: Mallampati: III        Dental:          Pulmonary: breath sounds clear to auscultation                             Cardiovascular:            Rhythm: regular                      Neuro/Psych:   (+) depression/anxiety             GI/Hepatic/Renal:   (+) morbid obesity          Endo/Other:                     Abdominal:   (+) obese,           Vascular: Other Findings:           Anesthesia Plan      MAC     ASA 3       Induction: intravenous. MIPS: Postoperative opioids intended. Anesthetic plan and risks discussed with patient. Plan discussed with CRNA.     Attending anesthesiologist reviewed and agrees with Preprocedure content            IRIS Angel - CRNA   11/1/2021

## 2021-11-02 ENCOUNTER — APPOINTMENT (OUTPATIENT)
Dept: GENERAL RADIOLOGY | Age: 41
End: 2021-11-02
Attending: SPECIALIST
Payer: COMMERCIAL

## 2021-11-02 ENCOUNTER — ANESTHESIA (OUTPATIENT)
Dept: OPERATING ROOM | Age: 41
End: 2021-11-02
Payer: COMMERCIAL

## 2021-11-02 ENCOUNTER — HOSPITAL ENCOUNTER (OUTPATIENT)
Age: 41
Setting detail: OUTPATIENT SURGERY
Discharge: HOME | End: 2021-11-02
Attending: SPECIALIST | Admitting: SPECIALIST
Payer: COMMERCIAL

## 2021-11-02 VITALS
DIASTOLIC BLOOD PRESSURE: 71 MMHG | HEIGHT: 67 IN | WEIGHT: 293 LBS | OXYGEN SATURATION: 97 % | RESPIRATION RATE: 16 BRPM | SYSTOLIC BLOOD PRESSURE: 112 MMHG | HEART RATE: 58 BPM | TEMPERATURE: 97.7 F | BODY MASS INDEX: 45.99 KG/M2

## 2021-11-02 VITALS
OXYGEN SATURATION: 100 % | SYSTOLIC BLOOD PRESSURE: 132 MMHG | DIASTOLIC BLOOD PRESSURE: 95 MMHG | RESPIRATION RATE: 18 BRPM

## 2021-11-02 DIAGNOSIS — Z01.818 ENCOUNTER FOR PREADMISSION TESTING: Primary | ICD-10-CM

## 2021-11-02 LAB — PREGNANCY TEST URINE, POC: NEGATIVE

## 2021-11-02 PROCEDURE — 6360000002 HC RX W HCPCS: Performed by: SPECIALIST

## 2021-11-02 PROCEDURE — 7100000011 HC PHASE II RECOVERY - ADDTL 15 MIN: Performed by: SPECIALIST

## 2021-11-02 PROCEDURE — 3600000012 HC SURGERY LEVEL 2 ADDTL 15MIN: Performed by: SPECIALIST

## 2021-11-02 PROCEDURE — C1769 GUIDE WIRE: HCPCS | Performed by: SPECIALIST

## 2021-11-02 PROCEDURE — 2709999900 HC NON-CHARGEABLE SUPPLY: Performed by: SPECIALIST

## 2021-11-02 PROCEDURE — 3600000002 HC SURGERY LEVEL 2 BASE: Performed by: SPECIALIST

## 2021-11-02 PROCEDURE — 2580000003 HC RX 258: Performed by: NURSE ANESTHETIST, CERTIFIED REGISTERED

## 2021-11-02 PROCEDURE — 2500000003 HC RX 250 WO HCPCS: Performed by: NURSE ANESTHETIST, CERTIFIED REGISTERED

## 2021-11-02 PROCEDURE — 2580000003 HC RX 258: Performed by: SPECIALIST

## 2021-11-02 PROCEDURE — 3700000000 HC ANESTHESIA ATTENDED CARE: Performed by: SPECIALIST

## 2021-11-02 PROCEDURE — 3700000001 HC ADD 15 MINUTES (ANESTHESIA): Performed by: SPECIALIST

## 2021-11-02 PROCEDURE — 7100000000 HC PACU RECOVERY - FIRST 15 MIN: Performed by: SPECIALIST

## 2021-11-02 PROCEDURE — 81025 URINE PREGNANCY TEST: CPT

## 2021-11-02 PROCEDURE — 6360000002 HC RX W HCPCS: Performed by: NURSE ANESTHETIST, CERTIFIED REGISTERED

## 2021-11-02 PROCEDURE — 7100000001 HC PACU RECOVERY - ADDTL 15 MIN: Performed by: SPECIALIST

## 2021-11-02 PROCEDURE — 7100000010 HC PHASE II RECOVERY - FIRST 15 MIN: Performed by: SPECIALIST

## 2021-11-02 RX ORDER — ONDANSETRON 2 MG/ML
INJECTION INTRAMUSCULAR; INTRAVENOUS PRN
Status: DISCONTINUED | OUTPATIENT
Start: 2021-11-02 | End: 2021-11-02 | Stop reason: SDUPTHER

## 2021-11-02 RX ORDER — ONDANSETRON 2 MG/ML
4 INJECTION INTRAMUSCULAR; INTRAVENOUS
Status: DISCONTINUED | OUTPATIENT
Start: 2021-11-02 | End: 2021-11-02 | Stop reason: HOSPADM

## 2021-11-02 RX ORDER — SODIUM CHLORIDE, SODIUM LACTATE, POTASSIUM CHLORIDE, CALCIUM CHLORIDE 600; 310; 30; 20 MG/100ML; MG/100ML; MG/100ML; MG/100ML
INJECTION, SOLUTION INTRAVENOUS ONCE
Status: COMPLETED | OUTPATIENT
Start: 2021-11-02 | End: 2021-11-02

## 2021-11-02 RX ORDER — LABETALOL HYDROCHLORIDE 5 MG/ML
5 INJECTION, SOLUTION INTRAVENOUS EVERY 10 MIN PRN
Status: DISCONTINUED | OUTPATIENT
Start: 2021-11-02 | End: 2021-11-02 | Stop reason: HOSPADM

## 2021-11-02 RX ORDER — HYDRALAZINE HYDROCHLORIDE 20 MG/ML
5 INJECTION INTRAMUSCULAR; INTRAVENOUS EVERY 10 MIN PRN
Status: DISCONTINUED | OUTPATIENT
Start: 2021-11-02 | End: 2021-11-02 | Stop reason: HOSPADM

## 2021-11-02 RX ORDER — LIDOCAINE HYDROCHLORIDE 20 MG/ML
INJECTION, SOLUTION EPIDURAL; INFILTRATION; INTRACAUDAL; PERINEURAL PRN
Status: DISCONTINUED | OUTPATIENT
Start: 2021-11-02 | End: 2021-11-02 | Stop reason: SDUPTHER

## 2021-11-02 RX ORDER — MIDAZOLAM HYDROCHLORIDE 1 MG/ML
INJECTION INTRAMUSCULAR; INTRAVENOUS PRN
Status: DISCONTINUED | OUTPATIENT
Start: 2021-11-02 | End: 2021-11-02 | Stop reason: SDUPTHER

## 2021-11-02 RX ORDER — SODIUM CHLORIDE, SODIUM LACTATE, POTASSIUM CHLORIDE, CALCIUM CHLORIDE 600; 310; 30; 20 MG/100ML; MG/100ML; MG/100ML; MG/100ML
INJECTION, SOLUTION INTRAVENOUS CONTINUOUS PRN
Status: DISCONTINUED | OUTPATIENT
Start: 2021-11-02 | End: 2021-11-02 | Stop reason: SDUPTHER

## 2021-11-02 RX ORDER — DEXAMETHASONE SODIUM PHOSPHATE 4 MG/ML
INJECTION, SOLUTION INTRA-ARTICULAR; INTRALESIONAL; INTRAMUSCULAR; INTRAVENOUS; SOFT TISSUE PRN
Status: DISCONTINUED | OUTPATIENT
Start: 2021-11-02 | End: 2021-11-02 | Stop reason: SDUPTHER

## 2021-11-02 RX ORDER — PROPOFOL 10 MG/ML
INJECTION, EMULSION INTRAVENOUS PRN
Status: DISCONTINUED | OUTPATIENT
Start: 2021-11-02 | End: 2021-11-02 | Stop reason: SDUPTHER

## 2021-11-02 RX ADMIN — LIDOCAINE HYDROCHLORIDE 100 MG: 20 INJECTION, SOLUTION EPIDURAL; INFILTRATION; INTRACAUDAL; PERINEURAL at 10:40

## 2021-11-02 RX ADMIN — MIDAZOLAM 2 MG: 1 INJECTION INTRAMUSCULAR; INTRAVENOUS at 10:38

## 2021-11-02 RX ADMIN — DEXAMETHASONE SODIUM PHOSPHATE 8 MG: 4 INJECTION, SOLUTION INTRAMUSCULAR; INTRAVENOUS at 10:49

## 2021-11-02 RX ADMIN — PROPOFOL 100 MG: 10 INJECTION, EMULSION INTRAVENOUS at 10:40

## 2021-11-02 RX ADMIN — ONDANSETRON 8 MG: 2 INJECTION INTRAMUSCULAR; INTRAVENOUS at 10:49

## 2021-11-02 RX ADMIN — SODIUM CHLORIDE, POTASSIUM CHLORIDE, SODIUM LACTATE AND CALCIUM CHLORIDE: 600; 310; 30; 20 INJECTION, SOLUTION INTRAVENOUS at 09:32

## 2021-11-02 RX ADMIN — CEFAZOLIN SODIUM 3000 MG: 1 INJECTION, POWDER, FOR SOLUTION INTRAMUSCULAR; INTRAVENOUS at 10:38

## 2021-11-02 RX ADMIN — SODIUM CHLORIDE, POTASSIUM CHLORIDE, SODIUM LACTATE AND CALCIUM CHLORIDE: 600; 310; 30; 20 INJECTION, SOLUTION INTRAVENOUS at 10:22

## 2021-11-02 ASSESSMENT — PULMONARY FUNCTION TESTS
PIF_VALUE: 0
PIF_VALUE: 1
PIF_VALUE: 3
PIF_VALUE: 1
PIF_VALUE: 9
PIF_VALUE: 7
PIF_VALUE: 3
PIF_VALUE: 1
PIF_VALUE: 6
PIF_VALUE: 6
PIF_VALUE: 8
PIF_VALUE: 1
PIF_VALUE: 5
PIF_VALUE: 0
PIF_VALUE: 6
PIF_VALUE: 6
PIF_VALUE: 0
PIF_VALUE: 20
PIF_VALUE: 7
PIF_VALUE: 1
PIF_VALUE: 1
PIF_VALUE: 0
PIF_VALUE: 1
PIF_VALUE: 9
PIF_VALUE: 5
PIF_VALUE: 0
PIF_VALUE: 1
PIF_VALUE: 1
PIF_VALUE: 8
PIF_VALUE: 21
PIF_VALUE: 0
PIF_VALUE: 1
PIF_VALUE: 5
PIF_VALUE: 9
PIF_VALUE: 6
PIF_VALUE: 1
PIF_VALUE: 1
PIF_VALUE: 2
PIF_VALUE: 0
PIF_VALUE: 1

## 2021-11-02 ASSESSMENT — PAIN SCALES - GENERAL
PAINLEVEL_OUTOF10: 2
PAINLEVEL_OUTOF10: 0

## 2021-11-02 ASSESSMENT — PAIN - FUNCTIONAL ASSESSMENT: PAIN_FUNCTIONAL_ASSESSMENT: 0-10

## 2021-11-02 NOTE — PROGRESS NOTES
1106- pt arrived from OR, monitors applied. Report received from [facility] and [facility]Jem Downing. Respirations even and unlabored. VSS  1135- recovery complete, preparing to transfer to [facility].

## 2021-11-02 NOTE — PROGRESS NOTES
1200:  Pt received from PACU sleepy but easily arousible, denies c/o pain or discomfort at this time.

## 2021-11-02 NOTE — BRIEF OP NOTE
Brief Postoperative Note      Patient: Tay Baptiste  YOB: 1980  MRN: [id number]    Date of Procedure: 11/2/2021    Pre-Op Diagnosis: Ureteric stone [N20.1]    Post-Op Diagnosis: Same       Procedure(s):  RIGHT CYSTOSCOPY STENT REMOVAL    Surgeon(s):  Romulo Burt MD    Assistant:  * No surgical staff found *    Anesthesia: General    Estimated Blood Loss (mL): Minimal    Complications: None    Specimens:   * No specimens in log *    Implants:  * No implants in log *      Drains: * No LDAs found *    Findings: uneventful right ureteral stent removal    Electronically signed by Romulo Burt MD on 11/2/2021 at 11:01 AM

## 2021-11-02 NOTE — ANESTHESIA POSTPROCEDURE EVALUATION
Department of Anesthesiology  Postprocedure Note    Patient: Maddi Cash  MRN: [id number]  YOB: 1980  Date of evaluation: 11/2/2021  Time:  11:11 AM     Procedure Summary     Date: 11/02/21 Room / Location: [place] [address] / [facility]    Anesthesia Start: 1022 Anesthesia Stop: 1111    Procedure: RIGHT CYSTOSCOPY STENT REMOVAL (Right ) Diagnosis:       Ureteric stone      (Ureteric stone [N20.1])    Surgeons: Johnnie Jackson MD Responsible Provider: Ngoc Gonsales MD    Anesthesia Type: MAC ASA Status: 3          Anesthesia Type: MAC    Shelia Phase I: Shelia Score: 9    Shelia Phase II:      Last vitals: Reviewed and per EMR flowsheets.        Anesthesia Post Evaluation    Patient location during evaluation: PACU  Patient participation: complete - patient participated  Level of consciousness: awake and alert  Pain score: 0  Airway patency: patent  Nausea & Vomiting: no nausea and no vomiting  Complications: no  Cardiovascular status: blood pressure returned to baseline and hemodynamically stable  Respiratory status: acceptable, spontaneous ventilation, nonlabored ventilation and face mask  Hydration status: stable

## 2021-11-02 NOTE — OP NOTE
[address] [place] [address], [address] [place] [address]                                OPERATIVE REPORT    PATIENT NAME: Mortimer Bream                   :        1980  MED REC NO:   [id number]                          ROOM:  ACCOUNT NO:   [de-identified]                           ADMIT DATE: 2021  PROVIDER:     Leilani Huizar MD    DATE OF PROCEDURE:  2021    PREOPERATIVE DIAGNOSIS:  Indwelling right ureteral stent. POSTOPERATIVE DIAGNOSIS:  Indwelling right ureteral stent. OPERATIONS PERFORMED:  Cystoscopy and right ureteral stent removal.    SURGEON:  Leilani Huizar MD    ANESTHESIA:  General anesthesia. ESTIMATED BLOOD LOSS:  None. COMPLICATIONS:  None. BRIEF HISTORY:  This is a 48-year-old female, who recently underwent  cystoscopy, uteroscopy, and stone manipulation. We did attempt a stent  removal in the office, stone was gone; however, this patient was too  uncomfortable to have this performed in the office, as she is here now  for anesthesia for cysto and stent removal.    DESCRIPTION OF PROCEDURE:  The patient was identified in the holding  area, taken to the procedure room, and placed in a dorsal lithotomy  position. The patient's genital area was prepped and draped in a  sterile fashion. A 21-Zambian cystoscope sheath was introduced per  urethra under direct visualization. Right ureteral stent was  identified, grasped, and removed intact. Bladder was emptied, taken to  the recovery room having tolerated the procedure well. DISCHARGE SUMMARY:  The patient was discharged from [facility] in  stable condition. We will see her back in the office in 1 to 2 weeks  for postoperative assessment.         Tere Lynn MD    D: 2021 11:03:48       T: 2021 11:07:01     [id number]  Job#: [id number]     Doc#: [id number]    CC:

## 2021-12-09 ENCOUNTER — HOSPITAL ENCOUNTER (OUTPATIENT)
Dept: ULTRASOUND IMAGING | Age: 41
Discharge: HOME | End: 2021-12-09
Payer: COMMERCIAL

## 2021-12-09 DIAGNOSIS — N20.1 CALCULUS OF URETER: ICD-10-CM

## 2021-12-09 PROCEDURE — 76775 US EXAM ABDO BACK WALL LIM: CPT

## 2021-12-15 ENCOUNTER — TELEPHONE (OUTPATIENT)
Dept: FAMILY MEDICINE CLINIC | Age: 41
End: 2021-12-15

## 2021-12-15 NOTE — TELEPHONE ENCOUNTER
Patient needing appt for side pain. Not seen since 2019. Tried to schedule nothing until after the first of the year due to Providers out and staff shortages.  Advised her to go to an urgent care or [facility], or to call back to for after the 1rst of the year appt

## 2021-12-15 NOTE — TELEPHONE ENCOUNTER
----- Message from Elyse Diamond sent at 12/14/2021  9:20 AM EST -----  Subject: Appointment Request    Reason for Call: Routine Existing Condition Follow Up    QUESTIONS  Type of Appointment? Established Patient  Reason for appointment request? No appointments available during search  Additional Information for Provider? Patient is having pains in right side   under ribs and had test done and wants to be seen for this no appointments   till January and patient wants to be seen before then. Requesting a call   back. Requesting to leave a voicemail with all details for appointment as   patient is at work  ---------------------------------------------------------------------------  --------------  [facility]  What is the best way for the office to contact you? OK to leave message on   voicemail  Preferred Call Back Phone Number? [phone]  ---------------------------------------------------------------------------  --------------  SCRIPT ANSWERS  Relationship to Patient? Self  Is this follow up request related to routine Diabetes Management? No  Have you been diagnosed with, awaiting test results for, or told that you   are suspected of having COVID-19 (Coronavirus)? (If patient has tested   negative or was tested as a requirement for work, school, or travel and   not based on symptoms, answer no)? No  Within the past two weeks have you developed any of the following symptoms   (answer no if symptoms have been present longer than 2 weeks or began   more than 2 weeks ago)? Fever or Chills, Cough, Shortness of breath or   difficulty breathing, Loss of taste or smell, Sore throat, Nasal   congestion, Sneezing or runny nose, Fatigue or generalized body aches   (answer no if pain is specific to a body part e.g. back pain), Diarrhea,   Headache? No  Have you had close contact with someone with COVID-19 in the last 14 days? No  (Service Expert  click yes below to proceed with [employer] As Usual   Scheduling)? Yes

## 2022-03-07 ENCOUNTER — HOSPITAL ENCOUNTER (EMERGENCY)
Age: 42
Discharge: HOME | End: 2022-03-07
Payer: COMMERCIAL

## 2022-03-07 VITALS
RESPIRATION RATE: 18 BRPM | BODY MASS INDEX: 49.81 KG/M2 | TEMPERATURE: 98.4 F | OXYGEN SATURATION: 99 % | SYSTOLIC BLOOD PRESSURE: 145 MMHG | HEART RATE: 72 BPM | WEIGHT: 293 LBS | DIASTOLIC BLOOD PRESSURE: 89 MMHG

## 2022-03-07 DIAGNOSIS — S61.214A LACERATION OF RIGHT RING FINGER WITHOUT FOREIGN BODY WITHOUT DAMAGE TO NAIL, INITIAL ENCOUNTER: Primary | ICD-10-CM

## 2022-03-07 PROCEDURE — 12001 RPR S/N/AX/GEN/TRNK 2.5CM/<: CPT

## 2022-03-07 PROCEDURE — 90715 TDAP VACCINE 7 YRS/> IM: CPT | Performed by: PHYSICIAN ASSISTANT

## 2022-03-07 PROCEDURE — 99284 EMERGENCY DEPT VISIT MOD MDM: CPT

## 2022-03-07 PROCEDURE — 90471 IMMUNIZATION ADMIN: CPT | Performed by: PHYSICIAN ASSISTANT

## 2022-03-07 PROCEDURE — 6360000002 HC RX W HCPCS: Performed by: PHYSICIAN ASSISTANT

## 2022-03-07 PROCEDURE — 2500000003 HC RX 250 WO HCPCS: Performed by: PHYSICIAN ASSISTANT

## 2022-03-07 RX ORDER — BUPIVACAINE HYDROCHLORIDE 5 MG/ML
10 INJECTION, SOLUTION EPIDURAL; INTRACAUDAL ONCE
Status: COMPLETED | OUTPATIENT
Start: 2022-03-07 | End: 2022-03-07

## 2022-03-07 RX ADMIN — BUPIVACAINE HYDROCHLORIDE 50 MG: 5 INJECTION, SOLUTION EPIDURAL; INTRACAUDAL at 22:27

## 2022-03-07 RX ADMIN — TETANUS TOXOID, REDUCED DIPHTHERIA TOXOID AND ACELLULAR PERTUSSIS VACCINE, ADSORBED 0.5 ML: 5; 2.5; 8; 8; 2.5 SUSPENSION INTRAMUSCULAR at 21:47

## 2022-03-07 ASSESSMENT — PAIN SCALES - GENERAL: PAINLEVEL_OUTOF10: 7

## 2022-03-07 NOTE — Clinical Note
Jade Scott was seen and treated in our emergency department on 3/7/2022. She may return to work on 03/14/2022. If you have any questions or concerns, please don't hesitate to call.       LAW Morales

## 2022-03-08 NOTE — ED PROVIDER NOTES
EMERGENCY DEPARTMENT ENCOUNTER        PCP: Gopi Espinoza MD    [address]    Chief Complaint   Patient presents with    Laceration         This patient was not evaluated by the attending physician. I have independently evaluated this patient . HPI    Jaxson Marin is a 36 y.o. female who presents to the emergency department today with a laceration into the right ring finger. Accidentally cut herself on a Can of Food while taking out the trash. No distal numbness, tingling, weakness, functional/motor deficit. Pt denies foreign body sensation. Not up to date Tetanus Vaccination Status        REVIEW OF SYSTEMS    General:   Denies symptoms preceding injury. Skin: + Laceration. SEE HPI  Musculoskeletal:  No distal numbness, tingling. No obvious tendon or motor deficits. Denies any other musculoskeletal injuries or skin trauma.     All other review of systems are negative  See HPI and nursing notes for additional information     PAST MEDICAL & SURGICAL HISTORY    Past Medical History:   Diagnosis Date    Obesity     UTI (urinary tract infection)      Past Surgical History:   Procedure Laterality Date    APPENDECTOMY  06/10/2020    BLADDER SURGERY N/A 8/15/2021    CYSTOSCOPY RETROGRADE PYELOGRAM STENT INSERTION [facility] 11/2/2021    RIGHT CYSTOSCOPY STENT REMOVAL performed by Babs Holden MD at [facility]      CT URETERAL STENT PLACEMENT THRU EXIST TRACT      CYSTOSCOPY Right 10/1/2021    RIGHT CYSTOSCOPY, RIGHT URETEROSCOPY STENT EXCHANGE, RETROGRADE PYELOGRAMS, LITHOTRIPSY, STONE EXTRACTION performed by Babs Holden MD at [facility]  06/10/2020       CURRENT MEDICATIONS    Current Outpatient Rx   Medication Sig Dispense Refill    NONFORMULARY Take 1 capsule by mouth Antibiotic- patient doesn't know name         ALLERGIES    Allergies   Allergen Reactions    Fentanyl Itching       SOCIAL & FAMILY HISTORY    Social History     Socioeconomic History    Marital status: Single     Spouse name: None    Number of children: None    Years of education: None    Highest education level: None   Occupational History    None   Tobacco Use    Smoking status: Never Smoker    Smokeless tobacco: Never Used   Vaping Use    Vaping Use: Never used   Substance and Sexual Activity    Alcohol use: No    Drug use: No    Sexual activity: Yes     Partners: Male     Comment: \"I am but we don't\"   Other Topics Concern    None   Social History Narrative    None     Social Determinants of Health     Financial Resource Strain:     Difficulty of Paying Living Expenses: Not on file   Food Insecurity:     Worried About Running Out of Food in the Last Year: Not on file    [place] of Food in the Last Year: Not on file   Transportation Needs:     Lack of Transportation (Medical): Not on file    Lack of Transportation (Non-Medical): Not on file   Physical Activity:     Days of Exercise per Week: Not on file    Minutes of Exercise per Session: Not on file   Stress:     Feeling of Stress : Not on file   Social Connections:     Frequency of Communication with Friends and Family: Not on file    Frequency of Social Gatherings with Friends and Family: Not on file    Attends Latter-day Services: Not on file    Active Member of [address] [place] [address] [employer] or Organizations: Not on file    Attends Club or Organization Meetings: Not on file    Marital Status: Not on file   Intimate Partner Violence:     Fear of Current or Ex-Partner: Not on file    Emotionally Abused: Not on file    Physically Abused: Not on file    Sexually Abused: Not on file   Housing Stability:     Unable to Pay for Housing in the Last Year: Not on file    Number of [place] in the Last Year: Not on file    Unstable Housing in the Last Year: Not on file     History reviewed. No pertinent family history.         PHYSICAL EXAM    VITAL SIGNS: BP (!) 145/89   Pulse 72   Temp 98.4 °F (36.9 °C) (Oral)   Resp 18   Wt (!) 318 lb (144.2 kg)   LMP 07/09/2018 (Approximate)   SpO2 99%   BMI 49.81 kg/m²   Constitutional:  Well developed, Appears comfortable  HEENT:  Normocephalic, Atraumatic. PERRL, EOMI. Ear canals, nasal passages, oropharynx clear of blood or clear fluid. Musculoskeletal:  No gross deformities. No motor deficits. Distal sensation and capillary refill intact. Vascular: Distal pulses and capillary refill intact. Integument:    On inspection there is a 3-4 cm laceratioh to the palmar aspect of the right ring finger   No obvious foreign body on initial inspection. No obvious tendon involvement    Distal sensation, capillary refill intact. Distal joints with full range of motion    See below for further details. Neurologic:  Awake and alert, normal flow of speech. CN 2-12 intact. Psychiatric: Cooperative, pleasant affect        RADIOLOGY/PROCEDURES    No results found. ________________________________________________________________________       Procedure Note - LAW ROBLERO, PABereC      Laceration Repair Procedure Note    Indication: Skin Laceration -3 cm right palmar ring finger    Procedure:   - Procedure explained, including risks and benefits explained to the patient who expressed understanding. All questions were answered. Verbal consent obtained. - The Wound was prepped and draped in the usual sterile fashion using Betadine and sterile saline.  - The wound is anesthetized using 2% lidocaine, approximately 5ml  - Wound was explored to it's depth:    no foreign bodies. no compromise of neurovascular or tendon structures  - Wound was irrigated with copious amounts of sterile saline and mechanically debrided utilizing sterile gauze. - The laceration was Closed with 4-0 Prolene sutures, total number of 5,  simple interrupted  - Hemostasis and good cosmesis was achieved.   Blood loss minimal.  - The wound area was then dressed with Sterile nonstick dressing, sterile gauze, and tape. - Patient tolerated procedure well without complications. Post procedure exam of the affected region reveals distal sensation, motor, capillary refill, and pulses intact    Total repaired wound length: 3 cm  ________________________________________________________________________    ED COURSE & MEDICAL DECISION MAKING      I discussed possibility of infection, retained foreign body, tendon injury, nerve injury. Wound care instructions discussed with patient today. Wound check in 2-3 days. Suture/Staple removal in 7-10 days. (discussed today). Return to emergency Department precautions were discussed in detail with patient who understands and agrees. Vital signs and nursing notes reviewed during ED course. All pertinent Lab data and radiographic results reviewed with patient at bedside. The patient and/or the family were informed of the results of any tests/labs/imaging, the treatment plan, and time was allotted to answer questions. Clinical  IMPRESSION    1. Laceration of right ring finger without foreign body without damage to nail, initial encounter      Comment: Please note this report has been produced using speech recognition software and may contain errors related to that system including errors in grammar, punctuation, and spelling, as well as words and phrases that may be inappropriate. If there are any questions or concerns please feel free to contact the dictating provider for clarification.         Butch Sandoval [address], [place]  03/07/22 [id number]

## 2022-03-08 NOTE — ED NOTES
Right hand finger wound cleaned with normal saline and wrapped with gauze.        FERCHO Marcano  03/07/22 [id number]

## 2022-03-21 ENCOUNTER — OFFICE VISIT (OUTPATIENT)
Dept: FAMILY MEDICINE CLINIC | Age: 42
End: 2022-03-21
Payer: COMMERCIAL

## 2022-03-21 VITALS
SYSTOLIC BLOOD PRESSURE: 124 MMHG | BODY MASS INDEX: 52.06 KG/M2 | WEIGHT: 293 LBS | DIASTOLIC BLOOD PRESSURE: 82 MMHG | OXYGEN SATURATION: 99 % | HEART RATE: 72 BPM

## 2022-03-21 DIAGNOSIS — R53.83 FATIGUE, UNSPECIFIED TYPE: ICD-10-CM

## 2022-03-21 DIAGNOSIS — F41.9 ANXIETY AND DEPRESSION: ICD-10-CM

## 2022-03-21 DIAGNOSIS — F32.A ANXIETY AND DEPRESSION: ICD-10-CM

## 2022-03-21 DIAGNOSIS — S61.214D LACERATION OF RIGHT RING FINGER, FOREIGN BODY PRESENCE UNSPECIFIED, NAIL DAMAGE STATUS UNSPECIFIED, SUBSEQUENT ENCOUNTER: Primary | ICD-10-CM

## 2022-03-21 PROCEDURE — G8427 DOCREV CUR MEDS BY ELIG CLIN: HCPCS | Performed by: FAMILY MEDICINE

## 2022-03-21 PROCEDURE — G8484 FLU IMMUNIZE NO ADMIN: HCPCS | Performed by: FAMILY MEDICINE

## 2022-03-21 PROCEDURE — 1036F TOBACCO NON-USER: CPT | Performed by: FAMILY MEDICINE

## 2022-03-21 PROCEDURE — G8417 CALC BMI ABV UP PARAM F/U: HCPCS | Performed by: FAMILY MEDICINE

## 2022-03-21 PROCEDURE — 99214 OFFICE O/P EST MOD 30 MIN: CPT | Performed by: FAMILY MEDICINE

## 2022-03-21 RX ORDER — ESCITALOPRAM OXALATE 10 MG/1
10 TABLET ORAL DAILY
Qty: 30 TABLET | Refills: 5 | Status: SHIPPED | OUTPATIENT
Start: 2022-03-21 | End: 2022-05-16 | Stop reason: SDUPTHER

## 2022-03-21 RX ORDER — AMOXICILLIN AND CLAVULANATE POTASSIUM 875; 125 MG/1; MG/1
1 TABLET, FILM COATED ORAL 2 TIMES DAILY
Qty: 20 TABLET | Refills: 0 | Status: SHIPPED | OUTPATIENT
Start: 2022-03-21 | End: 2022-03-31

## 2022-03-21 ASSESSMENT — PATIENT HEALTH QUESTIONNAIRE - PHQ9
2. FEELING DOWN, DEPRESSED OR HOPELESS: 1
SUM OF ALL RESPONSES TO PHQ9 QUESTIONS 1 & 2: 3
SUM OF ALL RESPONSES TO PHQ QUESTIONS 1-9: 7
4. FEELING TIRED OR HAVING LITTLE ENERGY: 1
1. LITTLE INTEREST OR PLEASURE IN DOING THINGS: 2
5. POOR APPETITE OR OVEREATING: 1
6. FEELING BAD ABOUT YOURSELF - OR THAT YOU ARE A FAILURE OR HAVE LET YOURSELF OR YOUR FAMILY DOWN: 0
SUM OF ALL RESPONSES TO PHQ QUESTIONS 1-9: 7
9. THOUGHTS THAT YOU WOULD BE BETTER OFF DEAD, OR OF HURTING YOURSELF: 0
SUM OF ALL RESPONSES TO PHQ QUESTIONS 1-9: 7
7. TROUBLE CONCENTRATING ON THINGS, SUCH AS READING THE NEWSPAPER OR WATCHING TELEVISION: 1
10. IF YOU CHECKED OFF ANY PROBLEMS, HOW DIFFICULT HAVE THESE PROBLEMS MADE IT FOR YOU TO DO YOUR WORK, TAKE CARE OF THINGS AT HOME, OR GET ALONG WITH OTHER PEOPLE: 2
SUM OF ALL RESPONSES TO PHQ QUESTIONS 1-9: 7
3. TROUBLE FALLING OR STAYING ASLEEP: 1
8. MOVING OR SPEAKING SO SLOWLY THAT OTHER PEOPLE COULD HAVE NOTICED. OR THE OPPOSITE, BEING SO FIGETY OR RESTLESS THAT YOU HAVE BEEN MOVING AROUND A LOT MORE THAN USUAL: 0

## 2022-03-21 NOTE — PROGRESS NOTES
Norah Francis  1980 03/26/22    Chief Complaint   Patient presents with    ED Follow-up     Patient seen in ED for cut finger    Other     Patient would like to discuss depression medications           Patient here for ED f/u for R ring figer laceration  Accidentally cut herself on a Can of Food while taking out the trash.   and stitched 5 suture done on 3/7/22, patient has pain, swelling and can not move her finger. Patient also c/o anxiety and depression, cry a lot, very sensitive, not sleeping well, denies suicidal idea or plan. Tried zolft and celexa but both didn't work. Patient feels fatigue all the time with no energy. Past Medical History:   Diagnosis Date    Obesity     UTI (urinary tract infection)      Past Surgical History:   Procedure Laterality Date    APPENDECTOMY  06/10/2020    BLADDER SURGERY N/A 8/15/2021    CYSTOSCOPY RETROGRADE PYELOGRAM STENT INSERTION performed by Carla Colorado MD at [facility] Right 11/2/2021    RIGHT CYSTOSCOPY STENT REMOVAL performed by Carla Colorado MD at [address] [place] [address] URETERAL STENT PLACEMENT THRU EXIST TRACT      CYSTOSCOPY Right 10/1/2021    RIGHT CYSTOSCOPY, RIGHT URETEROSCOPY STENT EXCHANGE, RETROGRADE PYELOGRAMS, LITHOTRIPSY, STONE EXTRACTION performed by Carla Colorado MD at [address] [place] [address]  06/10/2020     History reviewed. No pertinent family history.   Social History     Socioeconomic History    Marital status: Single     Spouse name: Not on file    Number of children: Not on file    Years of education: Not on file    Highest education level: Not on file   Occupational History    Not on file   Tobacco Use    Smoking status: Never Smoker    Smokeless tobacco: Never Used   Vaping Use    Vaping Use: Never used   Substance and Sexual Activity    Alcohol use: No    Drug use: No    Sexual activity: Yes     Partners: Male     Comment: \"I am but we don't\"   Other Topics Concern    Not on file   Social History Narrative    Not on file     Social Determinants of Health     Financial Resource Strain:     Difficulty of Paying Living Expenses: Not on file   Food Insecurity:     Worried About Running Out of Food in the Last Year: Not on file    [place] of Food in the Last Year: Not on file   Transportation Needs:     Lack of Transportation (Medical): Not on file    Lack of Transportation (Non-Medical): Not on file   Physical Activity:     Days of Exercise per Week: Not on file    Minutes of Exercise per Session: Not on file   Stress:     Feeling of Stress : Not on file   Social Connections:     Frequency of Communication with Friends and Family: Not on file    Frequency of Social Gatherings with Friends and Family: Not on file    Attends Congregation Services: Not on file    Active Member of [address] [place] [address] or Organizations: Not on file    Attends Club or Organization Meetings: Not on file    Marital Status: Not on file   Intimate Partner Violence:     Fear of Current or Ex-Partner: Not on file    Emotionally Abused: Not on file    Physically Abused: Not on file    Sexually Abused: Not on file   Housing Stability:     Unable to Pay for Housing in the Last Year: Not on file    Number of [place] in the Last Year: Not on file    Unstable Housing in the Last Year: Not on file       Allergies   Allergen Reactions    Fentanyl Itching     Current Outpatient Medications   Medication Sig Dispense Refill    amoxicillin-clavulanate (AUGMENTIN) 875-125 MG per tablet Take 1 tablet by mouth 2 times daily for 10 days 20 tablet 0    escitalopram (LEXAPRO) 10 MG tablet Take 1 tablet by mouth daily 30 tablet 5    NONFORMULARY Take 1 capsule by mouth Antibiotic- patient doesn't know name (Patient not taking: Reported on 3/21/2022)       No current facility-administered medications for this visit. Review of Systems   Constitutional: Positive for fatigue.  Negative for activity change, chills and fever. HENT: Negative for congestion. Respiratory: Negative for cough and shortness of breath. Cardiovascular: Negative for chest pain and leg swelling. Musculoskeletal: Positive for arthralgias (R ring finger swelling ). Skin: Positive for wound (R ring finger). Neurological: Negative for dizziness and headaches. Psychiatric/Behavioral: Positive for agitation, behavioral problems and sleep disturbance. Negative for self-injury and suicidal ideas. The patient is nervous/anxious.         Lab Results   Component Value Date    WBC 8.6 08/16/2021    HGB 11.2 (L) 08/16/2021    HCT 35.3 (L) 08/16/2021    MCV 88.0 08/16/2021     08/16/2021     Lab Results   Component Value Date     08/16/2021    K 4.0 08/16/2021     08/16/2021    CO2 25 08/16/2021    BUN 9 08/16/2021    CREATININE 0.6 08/16/2021    GLUCOSE 95 08/16/2021    CALCIUM 8.2 (L) 08/16/2021    PROT 7.0 08/14/2021    LABALBU 4.1 08/14/2021    BILITOT 0.5 08/14/2021    ALKPHOS 80 08/14/2021    AST 21 08/14/2021    ALT 30 08/14/2021    LABGLOM >60 08/16/2021    GFRAA >60 08/16/2021    AGRATIO 1.8 01/16/2018    GLOB 2.3 01/16/2018     Lab Results   Component Value Date    CHOL 140 10/13/2015     Lab Results   Component Value Date    TRIG 128 10/13/2015     Lab Results   Component Value Date    HDL 44 10/13/2015     Lab Results   Component Value Date    LDLCALC 70 10/13/2015     No results found for: LABA1C  Lab Results   Component Value Date    TSHHS 2.000 10/13/2015         /82 (Site: Left Lower Arm, Position: Sitting, Cuff Size: Medium Adult)   Pulse 72   Wt (!) 332 lb 6.4 oz (150.8 kg)   LMP 07/09/2018 (Approximate)   SpO2 99%   BMI 52.06 kg/m²     BP Readings from Last 3 Encounters:   03/24/22 124/82   03/21/22 124/82   03/07/22 (!) 145/89       Wt Readings from Last 3 Encounters:   03/24/22 (!) 332 lb (150.6 kg)   03/21/22 (!) 332 lb 6.4 oz (150.8 kg)   03/07/22 (!) 318 lb (144.2 kg)         Physical Exam  Constitutional:       General: She is not in acute distress. Appearance: Normal appearance. She is obese. She is not ill-appearing. HENT:      Head: Normocephalic. Cardiovascular:      Rate and Rhythm: Normal rate and regular rhythm. Heart sounds: No murmur heard. Pulmonary:      Effort: Pulmonary effort is normal.      Breath sounds: Normal breath sounds. No wheezing. Musculoskeletal:      Cervical back: Normal range of motion and neck supple. No rigidity. Skin:     Comments: R ring finger: around 4 cm laceration with 5 suture, with swelling, tender at the middle inter phalangeal joinr on the mancuso surface. Limitation of the movement. Neurological:      General: No focal deficit present. Mental Status: She is alert and oriented to person, place, and time. Psychiatric:         Mood and Affect: Mood is depressed. Affect is tearful. Speech: Speech normal.         Behavior: Behavior normal.         Thought Content: Thought content normal.         Judgment: Judgment normal.         ASSESSMENT/ PLAN:    1. Laceration of right ring finger, foreign body presence unspecified, nail damage status unspecified, subsequent encounter  - because of the swelling, tenderness, and difficult movment of the finger, patient could not tolerate to remove the all sutures only 2 sutures removed, so will give the antibiotic, and dressing done. - amoxicillin-clavulanate (AUGMENTIN) 875-125 MG per tablet; Take 1 tablet by mouth 2 times daily for 10 days  Dispense: 20 tablet; Refill: 0    2 suture removed only, patient could not tolerate to remove the rest, so will give the antibiotic and bring her back to remover the rest      2. Fatigue, unspecified type  - will do blood work  - T4, Free; Future  - TSH; Future  - Comprehensive Metabolic Panel, Fasting; Future  - CBC with Auto Differential; Future  - Lipid Panel; Future    3.  Anxiety and depression  - start:  - escitalopram (LEXAPRO) 10 MG tablet; Take 1 tablet by mouth daily  Dispense: 30 tablet; Refill: 11              - All old blood work reviewed with the patient  - Appropriate prescription are addressed. - After visit summery provided. - Questions answered and patient verbalizes understanding.  - Call for any problem, questions, or concerns.  - RTC if symptoms worse. Return in about 8 weeks (around 5/16/2022).   For her anxiety and depression

## 2022-03-24 ENCOUNTER — OFFICE VISIT (OUTPATIENT)
Dept: FAMILY MEDICINE CLINIC | Age: 42
End: 2022-03-24
Payer: COMMERCIAL

## 2022-03-24 VITALS
SYSTOLIC BLOOD PRESSURE: 124 MMHG | WEIGHT: 293 LBS | OXYGEN SATURATION: 98 % | HEART RATE: 68 BPM | DIASTOLIC BLOOD PRESSURE: 82 MMHG | BODY MASS INDEX: 52 KG/M2

## 2022-03-24 DIAGNOSIS — S61.224S: Primary | ICD-10-CM

## 2022-03-24 PROCEDURE — G8417 CALC BMI ABV UP PARAM F/U: HCPCS | Performed by: FAMILY MEDICINE

## 2022-03-24 PROCEDURE — 99213 OFFICE O/P EST LOW 20 MIN: CPT | Performed by: FAMILY MEDICINE

## 2022-03-24 PROCEDURE — G8484 FLU IMMUNIZE NO ADMIN: HCPCS | Performed by: FAMILY MEDICINE

## 2022-03-24 PROCEDURE — G8427 DOCREV CUR MEDS BY ELIG CLIN: HCPCS | Performed by: FAMILY MEDICINE

## 2022-03-24 PROCEDURE — 1036F TOBACCO NON-USER: CPT | Performed by: FAMILY MEDICINE

## 2022-03-24 NOTE — PROGRESS NOTES
Carmella Samuels  1980 03/24/22    Chief Complaint   Patient presents with    Suture / Staple Removal     From right ring finger           Patient here for suture removal for laceration on right ring finger, came 2 days ago and 2 sutures removed, today came to remove the 3 sutures, and was a started on antibiotic for some swelling and infection of the wound. Past Medical History:   Diagnosis Date    Obesity     UTI (urinary tract infection)      Past Surgical History:   Procedure Laterality Date    APPENDECTOMY  06/10/2020    BLADDER SURGERY N/A 8/15/2021    CYSTOSCOPY RETROGRADE PYELOGRAM STENT INSERTION performed by Herminia Patel MD at [address] Right 11/2/2021    RIGHT CYSTOSCOPY STENT REMOVAL performed by Herminia Patel MD at [facility] URETERAL STENT PLACEMENT THRU EXIST TRACT      CYSTOSCOPY Right 10/1/2021    RIGHT CYSTOSCOPY, RIGHT URETEROSCOPY STENT EXCHANGE, RETROGRADE PYELOGRAMS, LITHOTRIPSY, STONE EXTRACTION performed by Herminia Patel MD at [address] [place] [address]  06/10/2020     History reviewed. No pertinent family history.   Social History     Socioeconomic History    Marital status: Single     Spouse name: Not on file    Number of children: Not on file    Years of education: Not on file    Highest education level: Not on file   Occupational History    Not on file   Tobacco Use    Smoking status: Never Smoker    Smokeless tobacco: Never Used   Vaping Use    Vaping Use: Never used   Substance and Sexual Activity    Alcohol use: No    Drug use: No    Sexual activity: Yes     Partners: Male     Comment: \"I am but we don't\"   Other Topics Concern    Not on file   Social History Narrative    Not on file     Social Determinants of Health     Financial Resource Strain:     Difficulty of Paying Living Expenses: Not on file   Food Insecurity:     Worried About Running Out of Food in the Last Year: Not on file  Ran Out of Food in the Last Year: Not on file   Transportation Needs:     Lack of Transportation (Medical): Not on file    Lack of Transportation (Non-Medical): Not on file   Physical Activity:     Days of Exercise per Week: Not on file    Minutes of Exercise per Session: Not on file   Stress:     Feeling of Stress : Not on file   Social Connections:     Frequency of Communication with Friends and Family: Not on file    Frequency of Social Gatherings with Friends and Family: Not on file    Attends Hinduism Services: Not on file    Active Member of [address] [place] [address] [employer] or Organizations: Not on file    Attends Club or Organization Meetings: Not on file    Marital Status: Not on file   Intimate Partner Violence:     Fear of Current or Ex-Partner: Not on file    Emotionally Abused: Not on file    Physically Abused: Not on file    Sexually Abused: Not on file   Housing Stability:     Unable to Pay for Housing in the Last Year: Not on file    Number of [place] in the Last Year: Not on file    Unstable Housing in the Last Year: Not on file       Allergies   Allergen Reactions    Fentanyl Itching     Current Outpatient Medications   Medication Sig Dispense Refill    amoxicillin-clavulanate (AUGMENTIN) 875-125 MG per tablet Take 1 tablet by mouth 2 times daily for 10 days 20 tablet 0    escitalopram (LEXAPRO) 10 MG tablet Take 1 tablet by mouth daily 30 tablet 5    NONFORMULARY Take 1 capsule by mouth Antibiotic- patient doesn't know name (Patient not taking: Reported on 3/21/2022)       No current facility-administered medications for this visit. Review of Systems   Constitutional: Negative for activity change, chills and fever. Skin: Positive for wound (Laceration of the right ring finger).        Lab Results   Component Value Date    WBC 8.6 08/16/2021    HGB 11.2 (L) 08/16/2021    HCT 35.3 (L) 08/16/2021    MCV 88.0 08/16/2021     08/16/2021     Lab Results   Component Value Date     any problem, questions, or concerns. Return if symptoms worsen or fail to improve.

## 2022-03-26 ASSESSMENT — ENCOUNTER SYMPTOMS
SHORTNESS OF BREATH: 0
COUGH: 0

## 2022-05-16 ENCOUNTER — OFFICE VISIT (OUTPATIENT)
Dept: FAMILY MEDICINE CLINIC | Age: 42
End: 2022-05-16
Payer: COMMERCIAL

## 2022-05-16 VITALS
HEART RATE: 76 BPM | SYSTOLIC BLOOD PRESSURE: 120 MMHG | DIASTOLIC BLOOD PRESSURE: 86 MMHG | BODY MASS INDEX: 52.06 KG/M2 | OXYGEN SATURATION: 99 % | WEIGHT: 293 LBS

## 2022-05-16 DIAGNOSIS — E66.01 MORBID OBESITY (HCC): ICD-10-CM

## 2022-05-16 DIAGNOSIS — F41.9 ANXIETY AND DEPRESSION: Primary | ICD-10-CM

## 2022-05-16 DIAGNOSIS — M25.562 ACUTE PAIN OF LEFT KNEE: ICD-10-CM

## 2022-05-16 DIAGNOSIS — R53.83 FATIGUE, UNSPECIFIED TYPE: ICD-10-CM

## 2022-05-16 DIAGNOSIS — G89.29 CHRONIC PAIN OF LEFT KNEE: ICD-10-CM

## 2022-05-16 DIAGNOSIS — M25.562 CHRONIC PAIN OF LEFT KNEE: ICD-10-CM

## 2022-05-16 DIAGNOSIS — F32.A ANXIETY AND DEPRESSION: Primary | ICD-10-CM

## 2022-05-16 PROCEDURE — G8417 CALC BMI ABV UP PARAM F/U: HCPCS | Performed by: FAMILY MEDICINE

## 2022-05-16 PROCEDURE — G8427 DOCREV CUR MEDS BY ELIG CLIN: HCPCS | Performed by: FAMILY MEDICINE

## 2022-05-16 PROCEDURE — 99214 OFFICE O/P EST MOD 30 MIN: CPT | Performed by: FAMILY MEDICINE

## 2022-05-16 PROCEDURE — 1036F TOBACCO NON-USER: CPT | Performed by: FAMILY MEDICINE

## 2022-05-16 RX ORDER — ESCITALOPRAM OXALATE 20 MG/1
20 TABLET ORAL DAILY
Qty: 30 TABLET | Refills: 5 | Status: SHIPPED | OUTPATIENT
Start: 2022-05-16

## 2022-05-16 RX ORDER — POTASSIUM CITRATE 15 MEQ/1
TABLET, EXTENDED RELEASE ORAL
COMMUNITY
Start: 2022-05-06

## 2022-05-16 RX ORDER — NAPROXEN 500 MG/1
500 TABLET ORAL 2 TIMES DAILY WITH MEALS
Qty: 40 TABLET | Refills: 0 | Status: SHIPPED | OUTPATIENT
Start: 2022-05-16 | End: 2022-07-20 | Stop reason: ALTCHOICE

## 2022-05-16 RX ORDER — HYDROCODONE BITARTRATE AND ACETAMINOPHEN 5; 325 MG/1; MG/1
1 TABLET ORAL EVERY 8 HOURS PRN
Qty: 30 TABLET | Refills: 0 | Status: SHIPPED | OUTPATIENT
Start: 2022-05-16 | End: 2022-05-26

## 2022-05-16 RX ORDER — PHENTERMINE HYDROCHLORIDE 37.5 MG/1
37.5 TABLET ORAL
Qty: 30 TABLET | Refills: 0 | Status: SHIPPED | OUTPATIENT
Start: 2022-05-16 | End: 2022-07-08 | Stop reason: SDUPTHER

## 2022-05-16 NOTE — PROGRESS NOTES
Expenses: Not on file   Food Insecurity:     Worried About Running Out of Food in the Last Year: Not on file    [place] of Food in the Last Year: Not on file   Transportation Needs:     Lack of Transportation (Medical): Not on file    Lack of Transportation (Non-Medical): Not on file   Physical Activity:     Days of Exercise per Week: Not on file    Minutes of Exercise per Session: Not on file   Stress:     Feeling of Stress : Not on file   Social Connections:     Frequency of Communication with Friends and Family: Not on file    Frequency of Social Gatherings with Friends and Family: Not on file    Attends Islam Services: Not on file    Active Member of [address] [place] [address] [employer] or Organizations: Not on file    Attends Club or Organization Meetings: Not on file    Marital Status: Not on file   Intimate Partner Violence:     Fear of Current or Ex-Partner: Not on file    Emotionally Abused: Not on file    Physically Abused: Not on file    Sexually Abused: Not on file   Housing Stability:     Unable to Pay for Housing in the Last Year: Not on file    Number of [place] in the Last Year: Not on file    Unstable Housing in the Last Year: Not on file       Allergies   Allergen Reactions    Fentanyl Itching     Current Outpatient Medications   Medication Sig Dispense Refill    escitalopram (LEXAPRO) 20 MG tablet Take 1 tablet by mouth daily 30 tablet 5    phentermine (ADIPEX-P) 37.5 MG tablet Take 1 tablet by mouth every morning (before breakfast) for 30 days. 30 tablet 0    naproxen (NAPROSYN) 500 MG tablet Take 1 tablet by mouth 2 times daily (with meals) for 20 days 40 tablet 0    HYDROcodone-acetaminophen (NORCO) 5-325 MG per tablet Take 1 tablet by mouth every 8 hours as needed for Pain for up to 10 days. 30 tablet 0    Potassium Citrate ER 15 MEQ (1620 MG) TBCR TAKE 1 TABLET BY MOUTH ONCE DAILY WITH FOOD       No current facility-administered medications for this visit.        Review of Systems Constitutional: Negative for activity change, appetite change, chills, fatigue and fever. HENT: Negative for congestion and trouble swallowing. Respiratory: Negative for cough, shortness of breath and wheezing. Cardiovascular: Negative for chest pain and leg swelling. Musculoskeletal: Positive for arthralgias (left knee). Negative for myalgias. Neurological: Negative for dizziness and headaches. Psychiatric/Behavioral: Positive for agitation and sleep disturbance. Negative for self-injury and suicidal ideas. The patient is nervous/anxious.          Getting better with the medication       Lab Results   Component Value Date    WBC 7.0 05/16/2022    HGB 12.7 05/16/2022    HCT 38.1 05/16/2022    MCV 84.9 05/16/2022     05/16/2022     Lab Results   Component Value Date     05/16/2022    K 4.3 05/16/2022     05/16/2022    CO2 21 05/16/2022    BUN 13 05/16/2022    CREATININE 0.7 05/16/2022    GLUCOSE 95 08/16/2021    CALCIUM 8.9 05/16/2022    PROT 7.0 05/16/2022    LABALBU 4.4 05/16/2022    BILITOT 0.8 05/16/2022    ALKPHOS 78 05/16/2022    AST 16 05/16/2022    ALT 14 05/16/2022    LABGLOM >60 05/16/2022    GFRAA >60 05/16/2022    AGRATIO 1.7 05/16/2022    GLOB 2.3 01/16/2018     Lab Results   Component Value Date    CHOL 166 05/16/2022    CHOL 140 10/13/2015     Lab Results   Component Value Date    TRIG 123 05/16/2022    TRIG 128 10/13/2015     Lab Results   Component Value Date    HDL 51 05/16/2022    HDL 44 10/13/2015     Lab Results   Component Value Date    LDLCALC 90 05/16/2022    LDLCALC 70 10/13/2015     No results found for: LABA1C  Lab Results   Component Value Date    TSH 2.15 05/16/2022    TSHHS 2.000 10/13/2015         /86 (Site: Left Lower Arm, Position: Sitting, Cuff Size: Medium Adult)   Pulse 76   Wt (!) 332 lb 6.4 oz (150.8 kg)   LMP 07/09/2018 (Approximate)   SpO2 99%   BMI 52.06 kg/m²     BP Readings from Last 3 Encounters:   05/16/22 120/86   03/24/22 124/82   03/21/22 124/82       Wt Readings from Last 3 Encounters:   05/16/22 (!) 332 lb 6.4 oz (150.8 kg)   03/24/22 (!) 332 lb (150.6 kg)   03/21/22 (!) 332 lb 6.4 oz (150.8 kg)         Physical Exam  Constitutional:       General: She is not in acute distress. Appearance: She is well-developed. She is not diaphoretic. HENT:      Head: Normocephalic and atraumatic. Cardiovascular:      Rate and Rhythm: Normal rate and regular rhythm. Heart sounds: Normal heart sounds. No murmur heard. Pulmonary:      Effort: Pulmonary effort is normal. No respiratory distress. Breath sounds: Normal breath sounds. Musculoskeletal:      Left knee: No swelling or erythema. Normal range of motion. Tenderness present. Neurological:      General: No focal deficit present. Mental Status: She is alert and oriented to person, place, and time. Psychiatric:         Mood and Affect: Mood normal.         Behavior: Behavior normal.         Thought Content: Thought content normal.         Judgment: Judgment normal.         ASSESSMENT/ PLAN:    1. Anxiety and depression  - we start her on lexapro last visit, did help so increase:  - escitalopram (LEXAPRO) 20 MG tablet; Take 1 tablet by mouth daily  Dispense: 30 tablet; Refill: 5    2. Chronic pain of left knee  - acute on chronic, need to loss wt  - Dora Templeton MD, Orthopedic Surgery, [place]  - HYDROcodone-acetaminophen [facility]) 5-325 MG per tablet; Take 1 tablet by mouth every 8 hours as needed for Pain for up to 10 days. Dispense: 30 tablet; Refill: 0    3. Acute pain of left knee  - Dora Templeton MD, Orthopedic Surgery, [place]  - naproxen (NAPROSYN) 500 MG tablet; Take 1 tablet by mouth 2 times daily (with meals) for 20 days  Dispense: 40 tablet; Refill: 0    4. Morbid obesity ([address]  - start:  - phentermine (ADIPEX-P) 37.5 MG tablet; Take 1 tablet by mouth every morning (before breakfast) for 30 days.   Dispense: 30 tablet;

## 2022-05-17 LAB
A/G RATIO: 1.7 (ref 1.1–2.2)
ALBUMIN SERPL-MCNC: 4.4 G/DL (ref 3.4–5)
ALP BLD-CCNC: 78 U/L (ref 40–129)
ALT SERPL-CCNC: 14 U/L (ref 10–40)
ANION GAP SERPL CALCULATED.3IONS-SCNC: 14 MMOL/L (ref 3–16)
AST SERPL-CCNC: 16 U/L (ref 15–37)
BASOPHILS ABSOLUTE: 0 K/UL (ref 0–0.2)
BASOPHILS RELATIVE PERCENT: 0.7 %
BILIRUB SERPL-MCNC: 0.8 MG/DL (ref 0–1)
BUN BLDV-MCNC: 13 MG/DL (ref 7–20)
CALCIUM SERPL-MCNC: 8.9 MG/DL (ref 8.3–10.6)
CHLORIDE BLD-SCNC: 106 MMOL/L (ref 99–110)
CHOLESTEROL, TOTAL: 166 MG/DL (ref 0–199)
CO2: 21 MMOL/L (ref 21–32)
CREAT SERPL-MCNC: 0.7 MG/DL (ref 0.6–1.1)
EOSINOPHILS ABSOLUTE: 0.1 K/UL (ref 0–0.6)
EOSINOPHILS RELATIVE PERCENT: 1.7 %
GFR AFRICAN AMERICAN: >60
GFR NON-AFRICAN AMERICAN: >60
GLUCOSE FASTING: 126 MG/DL (ref 70–99)
HCT VFR BLD CALC: 38.1 % (ref 36–48)
HDLC SERPL-MCNC: 51 MG/DL (ref 40–60)
HEMOGLOBIN: 12.7 G/DL (ref 12–16)
LDL CHOLESTEROL CALCULATED: 90 MG/DL
LYMPHOCYTES ABSOLUTE: 2 K/UL (ref 1–5.1)
LYMPHOCYTES RELATIVE PERCENT: 28 %
MCH RBC QN AUTO: 28.4 PG (ref 26–34)
MCHC RBC AUTO-ENTMCNC: 33.5 G/DL (ref 31–36)
MCV RBC AUTO: 84.9 FL (ref 80–100)
MONOCYTES ABSOLUTE: 0.4 K/UL (ref 0–1.3)
MONOCYTES RELATIVE PERCENT: 5.8 %
NEUTROPHILS ABSOLUTE: 4.4 K/UL (ref 1.7–7.7)
NEUTROPHILS RELATIVE PERCENT: 63.8 %
PDW BLD-RTO: 13.9 % (ref 12.4–15.4)
PLATELET # BLD: 223 K/UL (ref 135–450)
PMV BLD AUTO: 7.6 FL (ref 5–10.5)
POTASSIUM SERPL-SCNC: 4.3 MMOL/L (ref 3.5–5.1)
RBC # BLD: 4.48 M/UL (ref 4–5.2)
SODIUM BLD-SCNC: 141 MMOL/L (ref 136–145)
T4 FREE: 1.4 NG/DL (ref 0.9–1.8)
TOTAL PROTEIN: 7 G/DL (ref 6.4–8.2)
TRIGL SERPL-MCNC: 123 MG/DL (ref 0–150)
TSH SERPL DL<=0.05 MIU/L-ACNC: 2.15 UIU/ML (ref 0.27–4.2)
VLDLC SERPL CALC-MCNC: 25 MG/DL
WBC # BLD: 7 K/UL (ref 4–11)

## 2022-05-22 ASSESSMENT — ENCOUNTER SYMPTOMS
SHORTNESS OF BREATH: 0
COUGH: 0
TROUBLE SWALLOWING: 0
WHEEZING: 0

## 2022-06-13 ENCOUNTER — OFFICE VISIT (OUTPATIENT)
Dept: ORTHOPEDIC SURGERY | Age: 42
End: 2022-06-13
Payer: COMMERCIAL

## 2022-06-13 VITALS
BODY MASS INDEX: 45.99 KG/M2 | HEIGHT: 67 IN | HEART RATE: 70 BPM | DIASTOLIC BLOOD PRESSURE: 91 MMHG | RESPIRATION RATE: 16 BRPM | SYSTOLIC BLOOD PRESSURE: 137 MMHG | WEIGHT: 293 LBS

## 2022-06-13 DIAGNOSIS — M17.12 PRIMARY OSTEOARTHRITIS OF LEFT KNEE: Primary | ICD-10-CM

## 2022-06-13 PROCEDURE — 99202 OFFICE O/P NEW SF 15 MIN: CPT | Performed by: PHYSICIAN ASSISTANT

## 2022-06-13 PROCEDURE — G8417 CALC BMI ABV UP PARAM F/U: HCPCS | Performed by: PHYSICIAN ASSISTANT

## 2022-06-13 PROCEDURE — G8427 DOCREV CUR MEDS BY ELIG CLIN: HCPCS | Performed by: PHYSICIAN ASSISTANT

## 2022-06-13 RX ORDER — CLOTRIMAZOLE AND BETAMETHASONE DIPROPIONATE 10; .64 MG/G; MG/G
CREAM TOPICAL
COMMUNITY
Start: 2022-03-22 | End: 2022-07-20 | Stop reason: ALTCHOICE

## 2022-06-13 ASSESSMENT — ENCOUNTER SYMPTOMS
GASTROINTESTINAL NEGATIVE: 1
EYES NEGATIVE: 1
RESPIRATORY NEGATIVE: 1

## 2022-06-13 NOTE — PATIENT INSTRUCTIONS
Work on gentle range of motion exercises  Continue weight loss   Call office if pain continues to discuss steroid injection

## 2022-06-13 NOTE — PROGRESS NOTES
Hoda Mckinnon is a 36 y.o. female presenting to the office complaining of pain in the left knee. Pt states that she started having pain on the posterior aspect of her left knee 3 months ago after any instances of prolonged inactivity. Pt states that pain is aching in nature and denies any previous injuries or surgeries.

## 2022-06-13 NOTE — PROGRESS NOTES
Review of Systems   Constitutional: Negative. HENT: Negative. Eyes: Negative. Respiratory: Negative. Cardiovascular: Negative. Gastrointestinal: Negative. Genitourinary: Negative. Musculoskeletal: Positive for arthralgias, joint swelling and myalgias. Skin: Negative. Neurological: Negative. Psychiatric/Behavioral: Negative. HPI:  Joel Bansal is a 36 y.o. female that presents the office today complaining of left knee pain with no known injury that is been going on for the last 3 months or so. She states that she has an aching pain along the medial aspect of the knee that does also radiate sometimes in the posterior aspect of the knee. She states that it is worse if she has kept her knee straight for long periods of time then goes to get up and bend the knee. She does take Aleve at times. She rates her pain at a 4/10 currently. Patient was referred by  Lucero Solomon MD  For left knee pain    Past Medical History:   Diagnosis Date    Obesity     UTI (urinary tract infection)        Past Surgical History:   Procedure Laterality Date    APPENDECTOMY  06/10/2020    BLADDER SURGERY N/A 8/15/2021    CYSTOSCOPY RETROGRADE PYELOGRAM STENT INSERTION performed by Lashaun Benavides MD at [facility] Right 11/2/2021    RIGHT CYSTOSCOPY STENT REMOVAL performed by Lashaun Benavides MD at [facility]      CT URETERAL STENT PLACEMENT THRU EXIST TRACT      CYSTOSCOPY Right 10/1/2021    RIGHT CYSTOSCOPY, RIGHT URETEROSCOPY STENT EXCHANGE, RETROGRADE PYELOGRAMS, LITHOTRIPSY, STONE EXTRACTION performed by Lashaun Benavides MD at [facility] [address])  06/10/2020       History reviewed. No pertinent family history.     Social History     Socioeconomic History    Marital status: Single     Spouse name: None    Number of children: None    Years of education: None    Highest education level: None Occupational History    None   Tobacco Use    Smoking status: Never Smoker    Smokeless tobacco: Never Used   Vaping Use    Vaping Use: Never used   Substance and Sexual Activity    Alcohol use: No    Drug use: No    Sexual activity: Yes     Partners: Male     Comment: \"I am but we don't\"   Other Topics Concern    None   Social History Narrative    None     Social Determinants of Health     Financial Resource Strain:     Difficulty of Paying Living Expenses: Not on file   Food Insecurity:     Worried About Running Out of Food in the Last Year: Not on file    [place] of Food in the Last Year: Not on file   Transportation Needs:     Lack of Transportation (Medical): Not on file    Lack of Transportation (Non-Medical):  Not on file   Physical Activity:     Days of Exercise per Week: Not on file    Minutes of Exercise per Session: Not on file   Stress:     Feeling of Stress : Not on file   Social Connections:     Frequency of Communication with Friends and Family: Not on file    Frequency of Social Gatherings with Friends and Family: Not on file    Attends Oriental orthodox Services: Not on file    Active Member of [address] [place] [address] or Organizations: Not on file    Attends Club or Organization Meetings: Not on file    Marital Status: Not on file   Intimate Partner Violence:     Fear of Current or Ex-Partner: Not on file    Emotionally Abused: Not on file    Physically Abused: Not on file    Sexually Abused: Not on file   Housing Stability:     Unable to Pay for Housing in the Last Year: Not on file    Number of [place] in the Last Year: Not on file    Unstable Housing in the Last Year: Not on file       Current Outpatient Medications   Medication Sig Dispense Refill    Potassium Citrate ER 15 MEQ (1620 MG) TBCR TAKE 1 TABLET BY MOUTH ONCE DAILY WITH FOOD      escitalopram (LEXAPRO) 20 MG tablet Take 1 tablet by mouth daily 30 tablet 5    phentermine (ADIPEX-P) 37.5 MG tablet Take 1 tablet by mouth every morning (before breakfast) for 30 days. 30 tablet 0    naproxen (NAPROSYN) 500 MG tablet Take 1 tablet by mouth 2 times daily (with meals) for 20 days 40 tablet 0    clotrimazole-betamethasone (LOTRISONE) 1-0.05 % cream APPLY CREAM TOPICALLY TWICE DAILY FOR 14 DAYS TO AFFECTED AREA. MAY USE LONGER AS NEEDED UNTIL ITCHING CLEARS (Patient not taking: Reported on 6/13/2022)       No current facility-administered medications for this visit. Allergies   Allergen Reactions    Fentanyl Itching       Review of Systems:  See above      Physical Exam:   BP (!) 137/91   Pulse 70   Resp 16   Ht 5' 7\" (1.702 m)   Wt (!) 333 lb (151 kg)   LMP 07/09/2018 (Approximate)   BMI 52.16 kg/m²        Gait is antalgic       Gen/Psych:Examination reveals a pleasant individual in no acute distress. The patient is oriented to time, place and person. The patient's mood and affect are appropriate. Patient appears well nourished. Body habitus is morbidly obese     Lymph:  no lymphedema in bilateral lower extremities     Skin intact in bilateral lower extremities with no ulcerations, lesions, rash, erythema. Vascular: There are no varicosities in bilateral lower extremities, sensation intact to light touch over bilateral lower extremities. left leg/knee exam:  Leg alignment:     neutral  Quadriceps/hamstring atrophy:   no  Knee effusion:    no   Knee erythema:   no  ROM:     0-120°    Increased ER at 30 deg's:  no  Varus laxity at 0 and 30 deg's: no  Valguslaxity at 0 and 30 deg's: no  Recurvatum:    yes - mild  Tenderness at:   Medial joint line    Left Knee strength is 5/5 flexion and extension  There is + L2-S1 motor and sensory function.      Outside record review: office notes    Imaging studies:  4 views of the left knee taken and reviewed in the office today show moderate to severe degenerative change primarily affecting the medial compartment of the left knee where there is bone-on-bone articulation and osteophyte formation off the medial tibial plateau. No acute fractures or dislocations are noted. The official read and interpretation of these x-rays will be done by the the [facility]         Impression:     Diagnosis Orders   1. Primary osteoarthritis of left knee             Plan:    I explained to the patient today that based on her x-rays she already does have moderate to severe degenerative change within the medial compartment and the best thing for her is to continue with range of motion, weight loss, moderate aerobic exercise 4 days a week to help with the range of motion of the knee and the weight loss. If it gets too bad she could consider steroid injection.   Patient Instructions   Work on gentle range of motion exercises  Continue weight loss   Call office if pain continues to discuss steroid injection

## 2022-07-08 ENCOUNTER — OFFICE VISIT (OUTPATIENT)
Dept: FAMILY MEDICINE CLINIC | Age: 42
End: 2022-07-08
Payer: COMMERCIAL

## 2022-07-08 VITALS
OXYGEN SATURATION: 98 % | WEIGHT: 293 LBS | BODY MASS INDEX: 52.47 KG/M2 | HEART RATE: 81 BPM | DIASTOLIC BLOOD PRESSURE: 88 MMHG | SYSTOLIC BLOOD PRESSURE: 112 MMHG

## 2022-07-08 DIAGNOSIS — K80.10 CALCULUS OF GALLBLADDER WITH CHRONIC CHOLECYSTITIS WITHOUT OBSTRUCTION: Primary | ICD-10-CM

## 2022-07-08 DIAGNOSIS — E66.01 MORBID OBESITY (HCC): ICD-10-CM

## 2022-07-08 PROCEDURE — G8427 DOCREV CUR MEDS BY ELIG CLIN: HCPCS | Performed by: FAMILY MEDICINE

## 2022-07-08 PROCEDURE — G8417 CALC BMI ABV UP PARAM F/U: HCPCS | Performed by: FAMILY MEDICINE

## 2022-07-08 PROCEDURE — 99214 OFFICE O/P EST MOD 30 MIN: CPT | Performed by: FAMILY MEDICINE

## 2022-07-08 PROCEDURE — 1036F TOBACCO NON-USER: CPT | Performed by: FAMILY MEDICINE

## 2022-07-08 RX ORDER — PHENTERMINE HYDROCHLORIDE 37.5 MG/1
37.5 TABLET ORAL
Qty: 30 TABLET | Refills: 0 | Status: SHIPPED | OUTPATIENT
Start: 2022-07-08 | End: 2022-08-07

## 2022-07-08 SDOH — ECONOMIC STABILITY: FOOD INSECURITY: WITHIN THE PAST 12 MONTHS, THE FOOD YOU BOUGHT JUST DIDN'T LAST AND YOU DIDN'T HAVE MONEY TO GET MORE.: PATIENT DECLINED

## 2022-07-08 SDOH — ECONOMIC STABILITY: FOOD INSECURITY: WITHIN THE PAST 12 MONTHS, YOU WORRIED THAT YOUR FOOD WOULD RUN OUT BEFORE YOU GOT MONEY TO BUY MORE.: PATIENT DECLINED

## 2022-07-08 ASSESSMENT — SOCIAL DETERMINANTS OF HEALTH (SDOH): HOW HARD IS IT FOR YOU TO PAY FOR THE VERY BASICS LIKE FOOD, HOUSING, MEDICAL CARE, AND HEATING?: PATIENT DECLINED

## 2022-07-08 NOTE — PROGRESS NOTES
Melida Joseph  1980 07/18/22    Chief Complaint   Patient presents with    1 Month Follow-Up     For adipex refiland also to discuss the result of the US , which did show gallbladder stone           Patient here for adipex refill and also to discuss the incidental finding by the US done by the urologist, did show Gallbladder stone, patient stats usually had pain and not feeling well with food. She would like to see the surgeon for that. Past Medical History:   Diagnosis Date    Obesity     UTI (urinary tract infection)      Past Surgical History:   Procedure Laterality Date    APPENDECTOMY  06/10/2020    BLADDER SURGERY N/A 8/15/2021    CYSTOSCOPY RETROGRADE PYELOGRAM STENT INSERTION performed by Benja Ocampo MD at [address] [place] [address] Right 11/2/2021    RIGHT CYSTOSCOPY STENT REMOVAL performed by Benja Ocampo MD at [address] [place] [address] URETERAL STENT PLACEMENT THRU EXIST TRACT      CYSTOSCOPY Right 10/1/2021    RIGHT CYSTOSCOPY, RIGHT URETEROSCOPY STENT EXCHANGE, RETROGRADE PYELOGRAMS, LITHOTRIPSY, STONE EXTRACTION performed by Benja Ocampo MD at [id number] [place] [address]  06/10/2020     No family history on file.   Social History     Socioeconomic History    Marital status: Single     Spouse name: Not on file    Number of children: Not on file    Years of education: Not on file    Highest education level: Not on file   Occupational History    Not on file   Tobacco Use    Smoking status: Never    Smokeless tobacco: Never   Vaping Use    Vaping Use: Never used   Substance and Sexual Activity    Alcohol use: No    Drug use: No    Sexual activity: Yes     Partners: Male     Comment: \"I am but we don't\"   Other Topics Concern    Not on file   Social History Narrative    Not on file     Social Determinants of Health     Financial Resource Strain: Unknown    Difficulty of Paying Living Expenses: Patient refused   Food Insecurity: Unknown Worried About [facility] in the Last Year: Patient refused    Ran Out of Food in the Last Year: Patient refused   Transportation Needs: Not on file   Physical Activity: Not on file   Stress: Not on file   Social Connections: Not on file   Intimate Partner Violence: Not on file   Housing Stability: Not on file       Allergies   Allergen Reactions    Fentanyl Itching     Current Outpatient Medications   Medication Sig Dispense Refill    phentermine (ADIPEX-P) 37.5 MG tablet Take 1 tablet by mouth every morning (before breakfast) for 30 days. 30 tablet 0    Potassium Citrate ER 15 MEQ (1620 MG) TBCR TAKE 1 TABLET BY MOUTH ONCE DAILY WITH FOOD      escitalopram (LEXAPRO) 20 MG tablet Take 1 tablet by mouth daily 30 tablet 5    clotrimazole-betamethasone (LOTRISONE) 1-0.05 % cream APPLY CREAM TOPICALLY TWICE DAILY FOR 14 DAYS TO AFFECTED AREA. MAY USE LONGER AS NEEDED UNTIL ITCHING CLEARS (Patient not taking: Reported on 6/13/2022)      naproxen (NAPROSYN) 500 MG tablet Take 1 tablet by mouth 2 times daily (with meals) for 20 days 40 tablet 0     No current facility-administered medications for this visit. Review of Systems   Constitutional:  Negative for activity change, appetite change, chills, fatigue and fever. HENT:  Negative for congestion. Respiratory:  Negative for cough, shortness of breath and wheezing. Cardiovascular:  Negative for chest pain and leg swelling. Neurological:  Negative for dizziness and headaches. Psychiatric/Behavioral:  Positive for agitation and sleep disturbance. Negative for self-injury and suicidal ideas. The patient is nervous/anxious.          Getting better with the medication     Lab Results   Component Value Date    WBC 7.0 05/16/2022    HGB 12.7 05/16/2022    HCT 38.1 05/16/2022    MCV 84.9 05/16/2022     05/16/2022     Lab Results   Component Value Date     05/16/2022    K 4.3 05/16/2022     05/16/2022    CO2 21 05/16/2022    BUN 13 05/16/2022    CREATININE 0.7 05/16/2022    GLUCOSE 95 08/16/2021    CALCIUM 8.9 05/16/2022    PROT 7.0 05/16/2022    LABALBU 4.4 05/16/2022    BILITOT 0.8 05/16/2022    ALKPHOS 78 05/16/2022    AST 16 05/16/2022    ALT 14 05/16/2022    LABGLOM >60 05/16/2022    GFRAA >60 05/16/2022    AGRATIO 1.7 05/16/2022    GLOB 2.3 01/16/2018     Lab Results   Component Value Date    CHOL 166 05/16/2022    CHOL 140 10/13/2015     Lab Results   Component Value Date    TRIG 123 05/16/2022    TRIG 128 10/13/2015     Lab Results   Component Value Date    HDL 51 05/16/2022    HDL 44 10/13/2015     Lab Results   Component Value Date    LDLCALC 90 05/16/2022    LDLCALC 70 10/13/2015     No results found for: LABA1C  Lab Results   Component Value Date    TSH 2.15 05/16/2022    TSHHS 2.000 10/13/2015         /88 (Site: Left Upper Arm, Position: Sitting, Cuff Size: Medium Adult)   Pulse 81   Wt (!) 335 lb (152 kg)   LMP 07/09/2018 (Approximate)   SpO2 98%   BMI 52.47 kg/m²     BP Readings from Last 3 Encounters:   07/08/22 112/88   06/13/22 (!) 137/91   05/16/22 120/86       Wt Readings from Last 3 Encounters:   07/08/22 (!) 335 lb (152 kg)   06/13/22 (!) 333 lb (151 kg)   05/16/22 (!) 332 lb 6.4 oz (150.8 kg)         Physical Exam  Constitutional:       General: She is not in acute distress. Appearance: She is well-developed. She is not diaphoretic. HENT:      Head: Normocephalic and atraumatic. Eyes:      General: No scleral icterus. Pupils: Pupils are equal, round, and reactive to light. Cardiovascular:      Rate and Rhythm: Normal rate and regular rhythm. Heart sounds: Normal heart sounds. No murmur heard. Pulmonary:      Effort: Pulmonary effort is normal.      Breath sounds: Normal breath sounds. No wheezing. Musculoskeletal:         General: Normal range of motion. Cervical back: Normal range of motion and neck supple. Right lower leg: No edema. Left lower leg: No edema. Neurological:      General: No focal deficit present. Mental Status: She is alert and oriented to person, place, and time. Psychiatric:         Mood and Affect: Mood normal.         Behavior: Behavior normal.       ASSESSMENT/ PLAN:    1. Calculus of gallbladder with chronic cholecystitis without obstruction  - incidental finding by the US  - (Epic) - Ariana Fernández MD, General Surgery, [place]    2. Morbid obesity ([address]  -didn't loss any wt. - phentermine (ADIPEX-P) 37.5 MG tablet; Take 1 tablet by mouth every morning (before breakfast) for 30 days. Dispense: 30 tablet; Refill: 0  - encourage watch diet and exercise. - All old blood work reviewed with the patient  - Appropriate prescription are addressed. - After visit quynhy provided. - Questions answered and patient verbalizes understanding.  - Call for any problem, questions, or concerns. Return in about 1 month (around 8/8/2022).

## 2022-07-18 PROBLEM — K80.10 CALCULUS OF GALLBLADDER WITH CHRONIC CHOLECYSTITIS WITHOUT OBSTRUCTION: Status: ACTIVE | Noted: 2022-07-18

## 2022-07-18 ASSESSMENT — ENCOUNTER SYMPTOMS
COUGH: 0
SHORTNESS OF BREATH: 0
WHEEZING: 0

## 2022-11-03 NOTE — BRIEF OP NOTE
Brief Postoperative Note      Patient: Alee Rosado  YOB: 1980  MRN: [id number]    Date of Procedure: 10/1/2021    Pre-Op Diagnosis: Ureteral stone [N20.1]    Post-Op Diagnosis: Same       Procedure(s):  RIGHT CYSTOSCOPY, RIGHT URETEROSCOPY STENT EXCHANGE, RETROGRADE PYELOGRAMS, LITHOTRIPSY, STONE EXTRACTION    Surgeon(s):  Aminata Soliz MD    Assistant:  * No surgical staff found *    Anesthesia: General    Estimated Blood Loss (mL): Minimal    Complications: None    Specimens:   * No specimens in log *    Implants:  Implant Name Type Inv.  Item Serial No.  Lot No. LRB No. Used Action   STENT URET 4.8FR [id number] PERCFLX HYDR+ SFT PGTL TAPR TIP  STENT URET 4.8FR Q16-13PH PERCFLX HYDR+ SFT PGTL TAPR TIP  [employer] [facility]- [id number] Right 1 Implanted         Drains: * No LDAs found *    Findings: 10 mm right midureteral stone; no findings of UTI    Electronically signed by Aminata Soliz MD on 10/1/2021 at 3:27 PM
Yes - the patient is able to be screened

## 2022-12-14 ENCOUNTER — OFFICE VISIT (OUTPATIENT)
Dept: ORTHOPEDIC SURGERY | Age: 42
End: 2022-12-14
Payer: COMMERCIAL

## 2022-12-14 VITALS
BODY MASS INDEX: 45.99 KG/M2 | WEIGHT: 293 LBS | RESPIRATION RATE: 16 BRPM | HEIGHT: 67 IN | HEART RATE: 62 BPM | OXYGEN SATURATION: 98 %

## 2022-12-14 DIAGNOSIS — M25.462 EFFUSION OF LEFT KNEE JOINT: ICD-10-CM

## 2022-12-14 DIAGNOSIS — M17.12 PRIMARY OSTEOARTHRITIS OF LEFT KNEE: Primary | ICD-10-CM

## 2022-12-14 PROCEDURE — G8484 FLU IMMUNIZE NO ADMIN: HCPCS | Performed by: PHYSICIAN ASSISTANT

## 2022-12-14 PROCEDURE — G8427 DOCREV CUR MEDS BY ELIG CLIN: HCPCS | Performed by: PHYSICIAN ASSISTANT

## 2022-12-14 PROCEDURE — 1036F TOBACCO NON-USER: CPT | Performed by: PHYSICIAN ASSISTANT

## 2022-12-14 PROCEDURE — G8417 CALC BMI ABV UP PARAM F/U: HCPCS | Performed by: PHYSICIAN ASSISTANT

## 2022-12-14 PROCEDURE — 99212 OFFICE O/P EST SF 10 MIN: CPT | Performed by: PHYSICIAN ASSISTANT

## 2022-12-14 RX ORDER — ESTRADIOL 0.05 MG/D
FILM, EXTENDED RELEASE TRANSDERMAL
COMMUNITY
Start: 2022-10-14

## 2022-12-14 RX ORDER — PHENTERMINE HYDROCHLORIDE 37.5 MG/1
TABLET ORAL
COMMUNITY
Start: 2022-08-03

## 2022-12-14 ASSESSMENT — ENCOUNTER SYMPTOMS
GASTROINTESTINAL NEGATIVE: 1
EYES NEGATIVE: 1
RESPIRATORY NEGATIVE: 1

## 2022-12-14 NOTE — PROGRESS NOTES
Patient is in the office for a left knee pain. Patient states that the pain is on the medial side of the knee. Patient states that she is wanting to discuss options.

## 2022-12-14 NOTE — PROGRESS NOTES
Review of Systems   Constitutional: Negative. HENT: Negative. Eyes: Negative. Respiratory: Negative. Cardiovascular: Negative. Gastrointestinal: Negative. Genitourinary: Negative. Musculoskeletal:  Positive for arthralgias, joint swelling and myalgias. Skin: Negative. Neurological: Negative. Psychiatric/Behavioral: Negative. HPI:  Lenora Barber is a 36 y.o. female that returns back to the office with recurrent left knee pain all over the knee but specifically on the medial side. She states that after the last time she saw me the pain did go away but the pain did come back. She has not injured the knee that she can recall. She does hear popping within the knee. She feels like there is swelling. Patient was referred by  No ref. provider found  For left knee pain    Past Medical History:   Diagnosis Date    Kidney stones     Obesity     UTI (urinary tract infection)        Past Surgical History:   Procedure Laterality Date    APPENDECTOMY  06/10/2020    BLADDER SURGERY N/A 08/15/2021    CYSTOSCOPY RETROGRADE PYELOGRAM STENT INSERTION performed by Amber Rice MD at [address] [place] [address] Right 11/02/2021    RIGHT CYSTOSCOPY STENT REMOVAL performed by Amber Rice MD at [address] [place] [address] EXIST TRACT      CYSTOSCOPY Right 10/01/2021    RIGHT CYSTOSCOPY, RIGHT URETEROSCOPY STENT EXCHANGE, RETROGRADE PYELOGRAMS, LITHOTRIPSY, STONE EXTRACTION performed by Amber Rice MD at [facility] ([address] [place] [address])  06/10/2020    PARTIAL HYSTERECTOMY (CERVIX NOT REMOVED)         No family history on file.     Social History     Socioeconomic History    Marital status: Single   Tobacco Use    Smoking status: Never    Smokeless tobacco: Never   Vaping Use    Vaping Use: Never used   Substance and Sexual Activity    Alcohol use: No    Drug use: No    Sexual activity: Yes     Partners: Male Comment: \"I am but we don't\"     Social Determinants of Health     Financial Resource Strain: Unknown    Difficulty of Paying Living Expenses: Patient refused   Food Insecurity: Unknown    Worried About Running Out of Food in the Last Year: Patient refused    [address] in the Last Year: Patient refused       Current Outpatient Medications   Medication Sig Dispense Refill    estradiol (VIVELLE) 0.05 MG/24HR APPLY 1 PATCH TWICE WEEKLY (Patient not taking: Reported on 12/14/2022)      phentermine (ADIPEX-P) 37.5 MG tablet DAILY (Patient not taking: Reported on 12/14/2022)      Potassium Citrate ER 15 MEQ (1620 MG) TBCR TAKE 1 TABLET BY MOUTH ONCE DAILY WITH FOOD (Patient not taking: Reported on 12/14/2022)      escitalopram (LEXAPRO) 20 MG tablet Take 1 tablet by mouth daily (Patient not taking: Reported on 12/14/2022) 30 tablet 5     No current facility-administered medications for this visit. Allergies   Allergen Reactions    Fentanyl Itching       Review of Systems:  See above      Physical Exam:   Pulse 62   Resp 16   Ht 5' 7\" (1.702 m)   Wt (!) 336 lb (152.4 kg)   LMP 07/09/2018 (Approximate)   SpO2 98%   BMI 52.63 kg/m²        Gait is antalgic       Gen/Psych:Examination reveals a pleasant individual in no acute distress. The patient is oriented to time, place and person. The patient's mood and affect are appropriate. Patient appears well nourished. Body habitus is morbidly obese     Lymph:  no lymphedema in bilateral lower extremities     Skin intact in bilateral lower extremities with no ulcerations, lesions, rash, erythema. Vascular: There are no varicosities in bilateral lower extremities, sensation intact to light touch over bilateral lower extremities.       left leg/knee exam:  Leg alignment:     neutral  Quadriceps/hamstring atrophy:   no  Knee effusion:    no   Knee erythema:   no  ROM:     0-120°    Increased ER at 30 deg's:  no  Varus laxity at 0 and 30 deg's: no  Valguslaxity at 0 and 30 deg's: no  Recurvatum:    yes - mild  Tenderness at:   Medial joint line, lateral joint line pain as well, pain with both lateral and medial Nika. There is palpable swelling noted along the medial aspect of the knee. Left Knee strength is 5/5 flexion and extension  There is + L2-S1 motor and sensory function. Outside record review: office notes    Imaging studies:  4 views of the left knee taken and reviewed in the office today show moderate to severe degenerative change primarily affecting the medial compartment of the left knee where there is bone-on-bone articulation and osteophyte formation off the medial tibial plateau. Impression:     Diagnosis Orders   1. Primary osteoarthritis of left knee  MRI KNEE LEFT WO CONTRAST      2. Effusion of left knee joint  MRI KNEE LEFT WO CONTRAST              Plan:    Once again I explained to the patient that she does have moderate arthritis already seen on x-ray but she is convinced that there is something else going on in the knee that is causing this pain and swelling. I told her that is possible she does have a meniscal tear but given the amount of arthritis in the knee it is likely that just trying to address the meniscus would ignore the bigger problem going on in the knee. She is convinced that she needs an MRI of the knee. Patient Instructions   MRI of left knee  Once the MRI is complete then follow-up in the office with me to go over the MRI results.

## 2022-12-14 NOTE — PATIENT INSTRUCTIONS
Central Scheduling # [phone]    MRI of left knee  Once the MRI is complete then follow-up in the office with me to go over the MRI results. We are committed to providing you the best care possible. If you receive a survey after visiting one of our offices, please take time to share your experience concerning your physician office visit. These surveys are confidential and no health information about you is shared. We are eager to improve for you and we are counting on your feedback to help make that happen.

## 2022-12-23 ENCOUNTER — HOSPITAL ENCOUNTER (OUTPATIENT)
Dept: MRI IMAGING | Age: 42
Discharge: HOME | End: 2022-12-23
Payer: COMMERCIAL

## 2022-12-23 DIAGNOSIS — M17.12 PRIMARY OSTEOARTHRITIS OF LEFT KNEE: ICD-10-CM

## 2022-12-23 DIAGNOSIS — M25.462 EFFUSION OF LEFT KNEE JOINT: ICD-10-CM

## 2022-12-23 PROCEDURE — 73721 MRI JNT OF LWR EXTRE W/O DYE: CPT

## 2023-01-11 ENCOUNTER — OFFICE VISIT (OUTPATIENT)
Dept: ORTHOPEDIC SURGERY | Age: 43
End: 2023-01-11
Payer: COMMERCIAL

## 2023-01-11 VITALS
BODY MASS INDEX: 45.99 KG/M2 | WEIGHT: 293 LBS | OXYGEN SATURATION: 98 % | HEIGHT: 67 IN | RESPIRATION RATE: 16 BRPM | HEART RATE: 72 BPM

## 2023-01-11 DIAGNOSIS — E66.01 OBESITY, MORBID, BMI 50 OR HIGHER (HCC): ICD-10-CM

## 2023-01-11 DIAGNOSIS — M17.12 PRIMARY OSTEOARTHRITIS OF LEFT KNEE: Primary | ICD-10-CM

## 2023-01-11 PROCEDURE — 20610 DRAIN/INJ JOINT/BURSA W/O US: CPT | Performed by: PHYSICIAN ASSISTANT

## 2023-01-11 PROCEDURE — 99213 OFFICE O/P EST LOW 20 MIN: CPT | Performed by: PHYSICIAN ASSISTANT

## 2023-01-11 PROCEDURE — 1036F TOBACCO NON-USER: CPT | Performed by: PHYSICIAN ASSISTANT

## 2023-01-11 PROCEDURE — G8417 CALC BMI ABV UP PARAM F/U: HCPCS | Performed by: PHYSICIAN ASSISTANT

## 2023-01-11 PROCEDURE — G8484 FLU IMMUNIZE NO ADMIN: HCPCS | Performed by: PHYSICIAN ASSISTANT

## 2023-01-11 PROCEDURE — G8427 DOCREV CUR MEDS BY ELIG CLIN: HCPCS | Performed by: PHYSICIAN ASSISTANT

## 2023-01-11 ASSESSMENT — ENCOUNTER SYMPTOMS
RESPIRATORY NEGATIVE: 1
GASTROINTESTINAL NEGATIVE: 1
EYES NEGATIVE: 1

## 2023-01-11 NOTE — PROGRESS NOTES
Review of Systems   Constitutional: Negative. HENT: Negative. Eyes: Negative. Respiratory: Negative. Cardiovascular: Negative. Gastrointestinal: Negative. Genitourinary: Negative. Musculoskeletal:  Positive for arthralgias, joint swelling and myalgias. Skin: Negative. Neurological: Negative. Psychiatric/Behavioral: Negative. HPI:  Michi Urias is a 36 y.o. female that returns the office today to go over MRI results of the left knee. She continues to have aching pain within the left knee pretty much at all times. She states that there is not 1 particular time where it is worse than another. Past Medical History:   Diagnosis Date    Kidney stones     Obesity     UTI (urinary tract infection)        Past Surgical History:   Procedure Laterality Date    APPENDECTOMY  06/10/2020    BLADDER SURGERY N/A 08/15/2021    CYSTOSCOPY RETROGRADE PYELOGRAM STENT INSERTION performed by Christophe Ballard MD at [address] [place] [address] Right 11/02/2021    RIGHT CYSTOSCOPY STENT REMOVAL performed by Christophe Ballard MD at [address] [place] [address] EXIST TRACT      CYSTOSCOPY Right 10/01/2021    RIGHT CYSTOSCOPY, RIGHT URETEROSCOPY STENT EXCHANGE, RETROGRADE PYELOGRAMS, LITHOTRIPSY, STONE EXTRACTION performed by Christophe Ballard MD at [place] [address] ([address] [place] [address])  06/10/2020    PARTIAL HYSTERECTOMY (CERVIX NOT REMOVED)         No family history on file.     Social History     Socioeconomic History    Marital status: Single     Spouse name: None    Number of children: None    Years of education: None    Highest education level: None   Tobacco Use    Smoking status: Never    Smokeless tobacco: Never   Vaping Use    Vaping Use: Never used   Substance and Sexual Activity    Alcohol use: No    Drug use: No    Sexual activity: Yes     Partners: Male     Comment: \"I am but we don't\"     Social Determinants of Health Financial Resource Strain: Unknown    Difficulty of Paying Living Expenses: Patient refused   Food Insecurity: Unknown    Worried About Running Out of Food in the Last Year: Patient refused    Ran Out of Food in the Last Year: Patient refused       Current Outpatient Medications   Medication Sig Dispense Refill    estradiol (VIVELLE) 0.05 MG/24HR APPLY 1 PATCH TWICE WEEKLY (Patient not taking: No sig reported)      phentermine (ADIPEX-P) 37.5 MG tablet DAILY (Patient not taking: No sig reported)      Potassium Citrate ER 15 MEQ (1620 MG) TBCR TAKE 1 TABLET BY MOUTH ONCE DAILY WITH FOOD (Patient not taking: No sig reported)      escitalopram (LEXAPRO) 20 MG tablet Take 1 tablet by mouth daily (Patient not taking: No sig reported) 30 tablet 5     No current facility-administered medications for this visit. Allergies   Allergen Reactions    Fentanyl Itching       Review of Systems:  See above      Physical Exam:   Pulse 72   Resp 16   Ht 5' 7\" (1.702 m)   Wt (!) 358 lb 9.6 oz (162.7 kg)   LMP 07/09/2018 (Approximate)   SpO2 98%   BMI 56.16 kg/m²        Gait is antalgic       Gen/Psych:Examination reveals a pleasant individual in no acute distress. The patient is oriented to time, place and person. The patient's mood and affect are appropriate. Patient appears well nourished. Body habitus is morbidly obese     Lymph:  no lymphedema in bilateral lower extremities     Skin intact in bilateral lower extremities with no ulcerations, lesions, rash, erythema. Vascular: There are no varicosities in bilateral lower extremities, sensation intact to light touch over bilateral lower extremities.       left leg/knee exam:  Leg alignment:     neutral  Quadriceps/hamstring atrophy:   no  Knee effusion:    no   Knee erythema:   no  ROM:     0-120°    Increased ER at 30 deg's:  no  Varus laxity at 0 and 30 deg's: no  Valguslaxity at 0 and 30 deg's: no  Recurvatum:    yes - mild  Tenderness at:   Medial joint line    Left Knee strength is 5/5 flexion and extension  There is + L2-S1 motor and sensory function. Outside record review: office notes    Imaging studies:  MRI of the knee was reviewed with the patient which showed posterior horn medial meniscus tear as well as moderate degenerative change affecting the medial compartment of the knee. Impression:     Diagnosis Orders   1. Primary osteoarthritis of left knee  LA ARTHROCENTESIS ASPIR&/INJ MAJOR JT/BURSA W/O US      2. Obesity, morbid, BMI 50 or higher ([address]                Plan:    I had a lengthy discussion with her today regarding her weight and the ability to do any surgery on her given the size of her thigh and the difficulty it would pose and using a tourniquet during surgery. I explained that she does have moderate to severe arthritis already starting to occur in the medial compartment of her knee so there is no guarantee that an arthroscopy would get rid of the pain that she is experiencing now. Explained that if she is having more meniscus type symptoms such as sharp stabbing pain periodically when she walks then possibly a meniscectomy could help but at this point I would try a steroid injection in the knee even though she feels like this is just a temporary measure. I did explain that yes the injection is a temporary fix but she needs to work on trying to lose weight before any type of surgery is done. Patient Instructions   Continue to weight bear as tolerated  Continue range of motion   Ice and elevate as needed  Tylenol or motrin for pain   Injection given today in office   Rest for 24-48 hours  Follow up with DR. Crawford if pain persists      Left knee injection procedure note    Pre-procedure diagnosis:  Left knee DJD    Post-procedure diagnosis:  same    Procedure: The planned procedure/risks/benefits/alternatives were discussed with the patient.   Risks include, but are not limited to, increased pain, drug reaction, infection, bleeding, lack of improvement, neurovascular injury, and increased blood sugar levels. The patient understood and all of their questions were answered. The Left knee was prepped with alcohol then a 22 gauge needle was advanced into the inferior-lateral joint without difficulty. The joint was then injected with 3 ml 1% lidocaine, 2ml of Kenalog (40 mg/ml), 1ml 0.5% bupivacaine the injection site was cleansed with isopropyl alcohol and a band-aid was placed. Complications:  None, the patient tolerated the procedure well. Instructions: The patient was advised to rest the knee and decrease activity for the next 24 to 48 hours. May use prescription or OTC pain relievers as needed for any post-injection pain as well as ice.

## 2023-01-11 NOTE — PATIENT INSTRUCTIONS
Continue to weight bear as tolerated  Continue range of motion   Ice and elevate as needed  Tylenol or motrin for pain   Injection given today in office   Rest for 24-48 hours  Follow up with DR. Crawford if pain persists

## 2023-02-17 ENCOUNTER — OFFICE VISIT (OUTPATIENT)
Dept: ORTHOPEDIC SURGERY | Age: 43
End: 2023-02-17

## 2023-02-17 VITALS
DIASTOLIC BLOOD PRESSURE: 90 MMHG | OXYGEN SATURATION: 98 % | HEART RATE: 78 BPM | TEMPERATURE: 97.9 F | SYSTOLIC BLOOD PRESSURE: 128 MMHG

## 2023-02-17 DIAGNOSIS — S83.232A COMPLEX TEAR OF MEDIAL MENISCUS OF LEFT KNEE AS CURRENT INJURY, INITIAL ENCOUNTER: Primary | ICD-10-CM

## 2023-02-17 ASSESSMENT — ENCOUNTER SYMPTOMS
NAUSEA: 0
VOICE CHANGE: 0
BACK PAIN: 0
SHORTNESS OF BREATH: 0
COLOR CHANGE: 0
SORE THROAT: 0
VOMITING: 0
WHEEZING: 0
COUGH: 0

## 2023-02-17 NOTE — PATIENT INSTRUCTIONS
We will schedule surgery at soonest convenience.  If you have any questions regarding your surgery please call our office and ask to speak with Wally Barrera [phone]

## 2023-02-17 NOTE — PROGRESS NOTES
Patient, 39year old female, is here for consult on left knee pain. Patient was previously seen by LAW Kessler for left knee pain/OA. Patient was referred with an MRI, which was performed on 12/23/22 impression as follows      Impression   1. Tricompartmental degenerative changes most pronounced in the medial   compartment to a mild-to-moderate degree. 2. Horizontal oblique tear contacting the inferior articular surface of the   posterior horn of the medial meniscus and extrusion of the body of the   meniscus from the joint line. 3. Moderate joint effusion. No pain reported today, patient reports occasional burning sensation in the back of her knee.

## 2023-02-17 NOTE — PROGRESS NOTES
2/17/2023   Chief Complaint   Patient presents with    Knee Pain     Left knee pain         History of Present Illness:                             Luis A Tellez is a 39 y.o. female  referred by LAW Uribe for evaluation and treatment of left knee pain. Patient denies any specific injury but states over the last approximate 9 months she had sudden and progressive medial sided left knee pain with significant mechanical symptoms. She was seen in our office by Francheska Bauer who performed a cortisone injection which she states provided no relief to the knee, however I was unable to find any note that states that she had a cortisone injection. She also recently completed an MRI of the left knee and is here to discuss the MRI findings and treatment course. The pain's location is medial joint line of left knee. she describes the symptoms as aching, sharp and stabbing. Symptoms improve with rest. Symptoms worsen with deep knee bending, getting up from a chair, weight bearing, sitting for prolonged periods of time, twisting activities. Again, patient denies prior injury to knee, denies numbness, tingling, fever, chills. Patient admits to occasional swelling and effusion with intermittent but prominent and painful mechanical symptoms. Denies instability. Worse with increased activity. Better with rest,     Treatment thus far has included rest, activity modifications, cortisone injection, oral medications without significant relief. Here today to discuss diagnosis and treatment options. Is affecting ADLs. Pain is 8/10 at it's worst.    Outside reports reviewed: Previous notes as well as MRI reviewed. Patient's medications, allergies, past medical, surgical, social and family histories were reviewed and updated as appropriate. Medical History  Patient's medications, allergies, past medical, surgical, social and family histories were reviewed and updated as appropriate.     Past Medical History:   Diagnosis Date    Kidney stones     Obesity     UTI (urinary tract infection)      Past Surgical History:   Procedure Laterality Date    APPENDECTOMY  06/10/2020    BLADDER SURGERY N/A 08/15/2021    CYSTOSCOPY RETROGRADE PYELOGRAM STENT INSERTION performed by Stefanie Burns MD at [facility] Right 11/02/2021    RIGHT CYSTOSCOPY STENT REMOVAL performed by Stefanie Burns MD at [facility] EXIST TRACT      CYSTOSCOPY Right 10/01/2021    RIGHT CYSTOSCOPY, RIGHT URETEROSCOPY STENT EXCHANGE, RETROGRADE PYELOGRAMS, LITHOTRIPSY, STONE EXTRACTION performed by Stefanie Burns MD at [address] [facility] ([address] [place] [address])  06/10/2020    PARTIAL HYSTERECTOMY (CERVIX NOT REMOVED)       No family history on file.   Social History     Socioeconomic History    Marital status: Single   Tobacco Use    Smoking status: Never    Smokeless tobacco: Never   Vaping Use    Vaping Use: Never used   Substance and Sexual Activity    Alcohol use: No    Drug use: No    Sexual activity: Yes     Partners: Male     Comment: \"I am but we don't\"     Social Determinants of Health     Financial Resource Strain: Unknown    Difficulty of Paying Living Expenses: Patient refused   Food Insecurity: Unknown    Worried About Running Out of Food in the Last Year: Patient refused    Ran Out of Food in the Last Year: Patient refused     Current Outpatient Medications   Medication Sig Dispense Refill    estradiol (VIVELLE) 0.05 MG/24HR APPLY 1 PATCH TWICE WEEKLY (Patient not taking: No sig reported)      phentermine (ADIPEX-P) 37.5 MG tablet DAILY (Patient not taking: No sig reported)      Potassium Citrate ER 15 MEQ (1620 MG) TBCR TAKE 1 TABLET BY MOUTH ONCE DAILY WITH FOOD (Patient not taking: No sig reported)      escitalopram (LEXAPRO) 20 MG tablet Take 1 tablet by mouth daily (Patient not taking: No sig reported) 30 tablet 5     No current facility-administered medications for this visit. Allergies   Allergen Reactions    Fentanyl Itching         Review of Systems   Constitutional:  Positive for activity change. Negative for fatigue and fever. HENT:  Negative for sneezing, sore throat and voice change. Respiratory:  Negative for cough, shortness of breath and wheezing. Cardiovascular:  Negative for leg swelling. Gastrointestinal:  Negative for nausea and vomiting. Musculoskeletal:  Positive for arthralgias and joint swelling. Negative for back pain, gait problem, myalgias, neck pain and neck stiffness. Skin:  Negative for color change, rash and wound. Neurological:  Negative for weakness and numbness. Psychiatric/Behavioral:  Negative for behavioral problems, confusion and self-injury. Examination:  General Exam:  Vitals: BP (!) 128/90 (Site: Right Upper Arm, Position: Sitting)   Pulse 78   Temp 97.9 °F (36.6 °C)   LMP 07/09/2018 (Approximate)   SpO2 98%    Physical Exam  Constitutional:       General: She is not in acute distress. Appearance: Normal appearance. She is obese. She is not ill-appearing. HENT:      Head: Normocephalic and atraumatic. Eyes:      General:         Right eye: No discharge. Left eye: No discharge. Extraocular Movements: Extraocular movements intact. Cardiovascular:      Pulses: Normal pulses. Pulmonary:      Effort: Pulmonary effort is normal.      Breath sounds: Normal breath sounds. Musculoskeletal:         General: Swelling and tenderness present. No deformity or signs of injury. Cervical back: Normal range of motion. Right hip: Normal.      Left hip: Normal.      Right upper leg: Normal.      Left upper leg: Normal.      Left knee: Swelling and bony tenderness present. No deformity, effusion, erythema, ecchymosis, lacerations or crepitus. Normal range of motion. Tenderness present over the medial joint line.  No lateral joint line, MCL or LCL tenderness. No LCL laxity, MCL laxity, ACL laxity or PCL laxity. Normal alignment, normal meniscus and normal patellar mobility. Normal pulse. Instability Tests: Anterior drawer test negative. Posterior drawer test negative. Medial Nika test negative and lateral Nika test negative. Right lower leg: Normal. No edema. Left lower leg: Normal. No edema. Right ankle: Normal.      Left ankle: Normal.      Right foot: Normal.      Left foot: Normal.   Skin:     General: Skin is warm and dry. Capillary Refill: Capillary refill takes less than 2 seconds. Neurological:      General: No focal deficit present. Mental Status: She is alert and oriented to person, place, and time. Mental status is at baseline. Gait: Gait normal.   Psychiatric:         Mood and Affect: Mood normal.         Behavior: Behavior normal.      LEFT KNEE EXAMINATION       OBSERVATION / INSPECTION     Gait: Mildly antalgic    Alignment: Neutral     Scars: None     Muscle atrophy: None    Effusion: Minimal    Warmth: None     Discoloration: none       TENDERNESS / CREPITUS (T / C): Patella - / -     Lateral joint line - / -  Medial joint line  + / +     Peripatellar lateral + / -  Peripatellar medial  - / -     Medial plica - / -  Lateral plica - / -    Patellar tendon - / -   Prepatellar Bursa - / -     Popliteal fossa - / -    Gastrocnemius - / -    Quadricep - / -   Quad tendon - / -      Tibial tubercle - / -     Thigh/hamstring - / -  Pes anserine/HS - / -     ITB - / -     Tibia - / -   Fibula - / -    Tib/fib joint - / -     MFC - / -    LFC - / -     MCL: Proximal - / -  Distal - / -    LCL - / -    MCL - / -      ROM: (* = pain)  PASSIVE  ACTIVE     Left :    0 / 115*  0 / 105*      Right :    0 / 125  0 / 125      PATELLOFEMORAL EXAMINATION:    See above noted areas of tenderness.      Patella position     Subluxation / dislocation: Centered     Sup. / Inf; Normal Crepitus (PF): Mild    Patellar Mobility:     Medial-lateral: Normal     Superior-inferior: Normal     Inferior tilt Normal     Patellar tendon: Normal     Lateral tilt: Normal     J-sign: None     Patellofemoral grind: Mild pain         MENISCAL SIGNS:     Pain on terminal extension: -    Pain on terminal flexion: Positive for pain only    Nikas maneuver: Positive for pain and mechanical symptoms    Squat: Positive for pain and mechanical symptoms      LIGAMENT EXAMINATION:    ACL / Lachman: normal (-1 to 2mm)      PCL-Post.  drawer: normal 0 to 2mm    MCL- Valgus: normal 0 to 2mm    LCL- Varus:  normal 0 to 2mm      STRENGTH: (* = with pain) PAINFUL SIDE    Quadricep 5/5    Hamstrin/5      EXTREMITY NEURO-VASCULAR EXAMINATION:     Sensation:  Grossly intact to light touch all dermatomal regions. Motor Function:  Fully intact motor function at hip, knee, foot and ankle      DTRs;  quadriceps and  achilles 2+. No clonus and downgoing Babinski. Vascular status:  DP and PT pulses 2+, brisk capillary refill, symmetric. Diagnostic testing:  MRI left knee without contrast images were reviewed by myself and discussed with the patient:  MENISCI: Horizontal oblique tear contacting the inferior articular surface of   the posterior horn of the medial meniscus with extrusion of the body from the   joint line. No lateral meniscus tear identified. CRUCIATE LIGAMENTS: The anterior and posterior cruciate ligaments are intact. EXTENSOR MECHANISM: The quadriceps and patellar tendons are intact. The   medial and lateral patellar retinacula are intact. LATERAL COLLATERAL LIGAMENT COMPLEX: The popliteus tendon, biceps femoris   tendon, fibular collateral ligament and iliotibial band are intact. MEDIAL COLLATERAL LIGAMENT COMPLEX: The superficial and deep components of   the medial collateral ligament are intact. KNEE JOINT: Moderate joint effusion.   No popliteal cyst.  Mild to moderate   medial compartment degenerative changes. Mild lateral and patellofemoral   compartment degenerative changes. No joint body identified. BONE MARROW: No fracture or dislocation. No suspicious marrow   space-occupying lesion. Office Procedures:  No orders of the defined types were placed in this encounter. Assessment and Plan    A: Left knee medial meniscus tear   Left knee osteoarthritis    P:   I discussed with the patient continuing conservative measures versus operative intervention and what both treatment plans would encompass, as well as the benefits and negatives of both interventions. Due to the patient's significant symptoms including pain and mechanical symptoms that are significant and painful, they would like to proceed with surgical intervention. I did have a thorough conversation with the patient about the MRI findings in regards to the osteoarthritis and explained that her underlying issues with pain may be related to the arthritis rather than the meniscus in this can definitely limit her ability to regain function and to improve after the surgery. I did explain that this is something that is unknown going to the surgery and I do not want her to have any false impressions of her ability to improve with a knee arthroscopy due to having underlying osteoarthritis and she voiced understanding. I explained that the surgery is based around improving the issues related to the meniscus and that the osteoarthritis will be cleaned up but this is a minimal procedure and the arthroscopy is not indicated for this portion of her pathology and she voiced understanding. I also explained that her body habitus will likely limit some of her postoperative course and can cause increased pain in the postoperative period and limit her overall function in the postoperative period and she voiced understanding.     I discussed surgical intervention in the form of:    Left knee arthroscopic:  Partial medial meniscectomy versus medial meniscus repair  Chondroplasty      I explained risks, benefits, possible complications of the procedure and answered all questions for the patient. This will include but is not limited to need for further surgery, blood loss, continued pain and instability, neurovascular injury, infection, extremity loss, death. I explained postoperative rehabilitation protocol and expectations with the patient today. The patient understands and consents to the procedure. Patient will follow up with their primary care physician prior to surgical treatment for preoperative clearance.     Electronically signed by Marianne Montoya DO on 2/17/2023 at 1:05 PM

## 2023-02-20 ENCOUNTER — TELEPHONE (OUTPATIENT)
Dept: ORTHOPEDIC SURGERY | Age: 43
End: 2023-02-20

## 2023-02-20 NOTE — TELEPHONE ENCOUNTER
Patient scheduled for    Left Knee Arthroscopy with Partial Medial Meniscectomy and Chondroplasty  Date: 2/28/23  Facility: [facility]  Surgeon: Shiraz Canales MD    Surgery Request faxed 2/20/23  PCP Clearance faxed to Dr. Carlos Roberto 2/20/23    Pre Op Appt 2/17/23    [facility] Insurance  Contacted 2/20/23 via [facility]  Status: Pending CPT 24218/70460 ICD S83.232A/M94.262 for outpatient    Phone PAT

## 2023-02-21 ENCOUNTER — OFFICE VISIT (OUTPATIENT)
Dept: FAMILY MEDICINE CLINIC | Age: 43
End: 2023-02-21

## 2023-02-21 VITALS
OXYGEN SATURATION: 95 % | WEIGHT: 293 LBS | SYSTOLIC BLOOD PRESSURE: 120 MMHG | HEIGHT: 67 IN | HEART RATE: 81 BPM | DIASTOLIC BLOOD PRESSURE: 85 MMHG | BODY MASS INDEX: 45.99 KG/M2

## 2023-02-21 DIAGNOSIS — G89.29 CHRONIC PAIN OF LEFT KNEE: Primary | ICD-10-CM

## 2023-02-21 DIAGNOSIS — M25.562 CHRONIC PAIN OF LEFT KNEE: Primary | ICD-10-CM

## 2023-02-21 DIAGNOSIS — S83.242S ACUTE MEDIAL MENISCUS TEAR OF LEFT KNEE, SEQUELA: ICD-10-CM

## 2023-02-21 DIAGNOSIS — Z01.818 PREOPERATIVE CLEARANCE: ICD-10-CM

## 2023-02-21 RX ORDER — ESTRADIOL 0.07 MG/D
FILM, EXTENDED RELEASE TRANSDERMAL
COMMUNITY
Start: 2023-02-11

## 2023-02-21 SDOH — ECONOMIC STABILITY: INCOME INSECURITY: HOW HARD IS IT FOR YOU TO PAY FOR THE VERY BASICS LIKE FOOD, HOUSING, MEDICAL CARE, AND HEATING?: NOT VERY HARD

## 2023-02-21 SDOH — ECONOMIC STABILITY: FOOD INSECURITY: WITHIN THE PAST 12 MONTHS, YOU WORRIED THAT YOUR FOOD WOULD RUN OUT BEFORE YOU GOT MONEY TO BUY MORE.: NEVER TRUE

## 2023-02-21 SDOH — ECONOMIC STABILITY: FOOD INSECURITY: WITHIN THE PAST 12 MONTHS, THE FOOD YOU BOUGHT JUST DIDN'T LAST AND YOU DIDN'T HAVE MONEY TO GET MORE.: NEVER TRUE

## 2023-02-21 SDOH — ECONOMIC STABILITY: HOUSING INSECURITY
IN THE LAST 12 MONTHS, WAS THERE A TIME WHEN YOU DID NOT HAVE A STEADY PLACE TO SLEEP OR SLEPT IN A SHELTER (INCLUDING NOW)?: NO

## 2023-02-21 ASSESSMENT — PATIENT HEALTH QUESTIONNAIRE - PHQ9
5. POOR APPETITE OR OVEREATING: 0
9. THOUGHTS THAT YOU WOULD BE BETTER OFF DEAD, OR OF HURTING YOURSELF: 0
SUM OF ALL RESPONSES TO PHQ QUESTIONS 1-9: 3
SUM OF ALL RESPONSES TO PHQ9 QUESTIONS 1 & 2: 1
8. MOVING OR SPEAKING SO SLOWLY THAT OTHER PEOPLE COULD HAVE NOTICED. OR THE OPPOSITE, BEING SO FIGETY OR RESTLESS THAT YOU HAVE BEEN MOVING AROUND A LOT MORE THAN USUAL: 0
SUM OF ALL RESPONSES TO PHQ QUESTIONS 1-9: 3
7. TROUBLE CONCENTRATING ON THINGS, SUCH AS READING THE NEWSPAPER OR WATCHING TELEVISION: 0
1. LITTLE INTEREST OR PLEASURE IN DOING THINGS: 0
4. FEELING TIRED OR HAVING LITTLE ENERGY: 1
10. IF YOU CHECKED OFF ANY PROBLEMS, HOW DIFFICULT HAVE THESE PROBLEMS MADE IT FOR YOU TO DO YOUR WORK, TAKE CARE OF THINGS AT HOME, OR GET ALONG WITH OTHER PEOPLE: 0
SUM OF ALL RESPONSES TO PHQ QUESTIONS 1-9: 3
2. FEELING DOWN, DEPRESSED OR HOPELESS: 1
3. TROUBLE FALLING OR STAYING ASLEEP: 0
6. FEELING BAD ABOUT YOURSELF - OR THAT YOU ARE A FAILURE OR HAVE LET YOURSELF OR YOUR FAMILY DOWN: 1
SUM OF ALL RESPONSES TO PHQ QUESTIONS 1-9: 3

## 2023-02-21 NOTE — PROGRESS NOTES
.Surgery @ [facility] on 2/28/23 you will be called 2/27/23 with times               1. Do not eat or drink anything after midnight - unless instructed by your doctor prior to surgery. This includes                   no water, chewing gum or mints. 2. Follow your directions as prescribed by the doctor for your procedure and medications. Morning of surgery take: No medications   3. Check with your Doctor regarding stopping vitamins, supplements, blood thinners and follow their instructions. Stop vitamins, supplements and NSAIDS:  Today   4. Do not smoke, vape or use chewing tobacco morning of surgery. Do not drink any alcoholic beverages 24 hours prior to surgery. This includes NA Beer. No street drugs 7 days prior to surgery. 5. You may brush your teeth and gargle the morning of surgery. DO NOT SWALLOW WATER   6. You MUST make arrangements for a responsible adult to take you home after your surgery and be able to check on you every couple                   hours for the day. You will not be allowed to leave alone or drive yourself home. It is strongly suggested someone stay with you the first 24                   hrs. Your surgery will be cancelled if you do not have a ride home. 7. Please wear simple, loose fitting clothing to the hospital.  Sidney Williamsonpriscilla not bring valuables (money, credit cards, checkbooks, etc.) Do not wear any                   makeup (including no eye makeup) or nail polish on your fingers or toes. 8. DO NOT wear any jewelry or piercings on day of surgery. All body piercing jewelry must be removed. 9. If you have dentures, they will be removed before going to the OR; we will provide you a container. If you wear contact lenses or glasses,                  they will be removed; please bring a case for them. 10. If you  have a Living Will and Durable Power of  for Healthcare, please bring in a copy.            11. Please bring picture ID, insurance card, paperwork from the doctors office    (H & P, Consent, & card for implantable devices). 12. Take a shower with Hibiclens or an antibacterial soap the night before your surgery (put clean sheets on your bed). Take a 2nd shower with the antibacterial soap the morning of surgery. No clipping or shaving prior to surgery. Do not apply any make-up, deodorant, lotion, oil or powder after the morning shower. 15.  Enter thru the main entrance on the day of surgery.

## 2023-02-21 NOTE — PROGRESS NOTES
Jodeeia Wellington  1980 02/26/23    Chief Complaint   Patient presents with    Pre-op Exam     For left knee, arthroplasy           Patient here for pre operative visit for left knee surgery, and meniscal repair, on 2/28 by Esteban Leary. Patient doing fine, never has any problem from the anaesthesia. Past Medical History:   Diagnosis Date    Kidney stones     last stone:11/21    Obesity     UTI (urinary tract infection)      Past Surgical History:   Procedure Laterality Date    APPENDECTOMY  06/10/2020    BLADDER SURGERY N/A 08/15/2021    CYSTOSCOPY RETROGRADE PYELOGRAM STENT INSERTION performed by Radha Kwan MD at [address] [place] [address] Right 11/02/2021    RIGHT CYSTOSCOPY STENT REMOVAL performed by Radha Kwan MD at [address] [place] [address] EXIST TRACT      CYSTOSCOPY Right 10/01/2021    RIGHT CYSTOSCOPY, RIGHT URETEROSCOPY STENT EXCHANGE, RETROGRADE PYELOGRAMS, LITHOTRIPSY, STONE EXTRACTION performed by Radha Kwan MD at [address] [facility] ([address] [place] [address])  06/10/2020     No family history on file.   Social History     Socioeconomic History    Marital status: Single     Spouse name: Not on file    Number of children: Not on file    Years of education: Not on file    Highest education level: Not on file   Occupational History    Not on file   Tobacco Use    Smoking status: Never    Smokeless tobacco: Never   Vaping Use    Vaping Use: Never used   Substance and Sexual Activity    Alcohol use: No    Drug use: No    Sexual activity: Yes     Partners: Male     Comment: \"I am but we don't\"   Other Topics Concern    Not on file   Social History Narrative    Not on file     Social Determinants of Health     Financial Resource Strain: Low Risk     Difficulty of Paying Living Expenses: Not very hard   Food Insecurity: No Food Insecurity    Worried About Running Out of Food in the Last Year: Never true    [address] in the Last Year: Never true   Transportation Needs: Unknown    Lack of Transportation (Medical): Not on file    Lack of Transportation (Non-Medical): No   Physical Activity: Not on file   Stress: Not on file   Social Connections: Not on file   Intimate Partner Violence: Not on file   Housing Stability: Unknown    Unable to Pay for Housing in the Last Year: Not on file    Number of Places Lived in the Last Year: Not on file    Unstable Housing in the Last Year: No       Allergies   Allergen Reactions    Fentanyl Itching     Current Outpatient Medications   Medication Sig Dispense Refill    estradiol (VIVELLE) 0.075 MG/24HR        No current facility-administered medications for this visit. Review of Systems   Constitutional:  Negative for activity change, appetite change, chills, fatigue and fever. HENT:  Negative for congestion. Respiratory:  Negative for cough, shortness of breath and wheezing. Cardiovascular:  Negative for chest pain and leg swelling. Musculoskeletal:  Positive for arthralgias (left knee pain). Neurological:  Negative for dizziness and headaches. Psychiatric/Behavioral:  Positive for agitation and sleep disturbance. Negative for self-injury and suicidal ideas. The patient is nervous/anxious.          Getting better with the medication     Lab Results   Component Value Date    WBC 7.0 05/16/2022    HGB 12.7 05/16/2022    HCT 38.1 05/16/2022    MCV 84.9 05/16/2022     05/16/2022     Lab Results   Component Value Date     05/16/2022    K 4.3 05/16/2022     05/16/2022    CO2 21 05/16/2022    BUN 13 05/16/2022    CREATININE 0.7 05/16/2022    GLUCOSE 95 08/16/2021    CALCIUM 8.9 05/16/2022    PROT 7.0 05/16/2022    LABALBU 4.4 05/16/2022    BILITOT 0.8 05/16/2022    ALKPHOS 78 05/16/2022    AST 16 05/16/2022    ALT 14 05/16/2022    LABGLOM >60 05/16/2022    GFRAA >60 05/16/2022    AGRATIO 1.7 05/16/2022    GLOB 2.3 01/16/2018     Lab Results   Component Value Date    CHOL 166 05/16/2022    CHOL 140 10/13/2015     Lab Results   Component Value Date    TRIG 123 05/16/2022    TRIG 128 10/13/2015     Lab Results   Component Value Date    HDL 51 05/16/2022    HDL 44 10/13/2015     Lab Results   Component Value Date    LDLCALC 90 05/16/2022    LDLCALC 70 10/13/2015     No results found for: LABA1C  Lab Results   Component Value Date    TSH 2.15 05/16/2022    TSHHS 2.000 10/13/2015         /85   Pulse 81   Ht 5' 7\" (1.702 m)   Wt (!) 365 lb 12.8 oz (165.9 kg)   LMP 07/09/2018 (Approximate)   SpO2 95%   BMI 57.29 kg/m²     BP Readings from Last 3 Encounters:   02/21/23 120/85   02/17/23 (!) 128/90   07/08/22 112/88       Wt Readings from Last 3 Encounters:   02/21/23 (!) 365 lb (165.6 kg)   02/21/23 (!) 365 lb 12.8 oz (165.9 kg)   01/11/23 (!) 358 lb 9.6 oz (162.7 kg)         Physical Exam  Constitutional:       General: She is not in acute distress. Appearance: Normal appearance. She is well-developed. She is obese. She is not diaphoretic. HENT:      Head: Normocephalic and atraumatic. Eyes:      General: No scleral icterus. Pupils: Pupils are equal, round, and reactive to light. Cardiovascular:      Rate and Rhythm: Normal rate and regular rhythm. Heart sounds: Normal heart sounds. No murmur heard. Pulmonary:      Effort: Pulmonary effort is normal.      Breath sounds: Normal breath sounds. No wheezing. Musculoskeletal:         General: Normal range of motion. Cervical back: Normal range of motion and neck supple. Right lower leg: No edema. Left lower leg: No edema. Neurological:      General: No focal deficit present. Mental Status: She is alert and oriented to person, place, and time. Psychiatric:         Mood and Affect: Mood normal.         Behavior: Behavior normal.       ASSESSMENT/ PLAN:    1. Preoperative clearance  - patient cleared for surgery    2. Chronic pain of left knee  - patient going to have the surgery     3. Acute medial meniscus tear of left knee, sequela  - going to have the surgery            - All old blood work reviewed with the patient  - Appropriate prescription are addressed.  - After visit summery provided.  - Questions answered and patient verbalizes understanding.  - Call for any problem, questions, or concerns.  - RTC if symptoms worse.       Return in about 6 months (around 8/21/2023).

## 2023-02-22 ENCOUNTER — ANESTHESIA EVENT (OUTPATIENT)
Dept: OPERATING ROOM | Age: 43
End: 2023-02-22
Payer: COMMERCIAL

## 2023-02-22 ENCOUNTER — TELEPHONE (OUTPATIENT)
Dept: ORTHOPEDIC SURGERY | Age: 43
End: 2023-02-22

## 2023-02-22 NOTE — TELEPHONE ENCOUNTER
Received  fax letter from [facility] that they are granting partial approval.  [id number] is approved  [phone] is denied, most likely this code is included in the code [id number]. Updated Procedure request faxed to [facility].   Letter scanned into patients chart

## 2023-02-22 NOTE — ANESTHESIA PRE PROCEDURE
Department of Anesthesiology  Preprocedure Note       Name:  Bridget Vaughn   Age:  39 y.o.  :  1980                                          MRN:  [id number]         Date:  2023      Surgeon: Mariana Perez):  Sheri Mccartney DO    Procedure: Procedure(s):  LEFT KNEE ARTHROSCOPY WITH PARTIAL MEDIAL MENISCECTOMY    Medications prior to admission:   Prior to Admission medications    Medication Sig Start Date End Date Taking? Authorizing Provider   estradiol (VIVELLE) 0.075 MG/24HR  23   Historical Provider, MD       Current medications:    Current Outpatient Medications   Medication Sig Dispense Refill    estradiol (VIVELLE) 0.075 MG/24HR        No current facility-administered medications for this visit. Allergies:     Allergies   Allergen Reactions    Fentanyl Itching       Problem List:    Patient Active Problem List   Diagnosis Code    High-risk pregnancy O09.90    Morbid obesity ([facility] [address] E66.01    Advanced maternal age (AMA) in pregnancy [id number]    41 weeks gestation of pregnancy O48.0, Z3A.41    Patella-femoral syndrome M22.2X9    Iron deficiency anemia D50.9    Vitamin D deficiency E55.9    Knee pain, chronic M25.569, G89.29    Oral herpes simplex infection B00.2    Patellar subluxation S83.003A    Knee MCL sprain S83.419A    Knee LCL sprain S83.429A    Anxiety F41.9    Obesity, morbid, BMI 50 or higher ([facility] [address] E66.01    Left knee pain M25.562    Chronic pain of both knees M25.561, M25.562, G89.29    Calculus of gallbladder with chronic cholecystitis without obstruction K80.10    Complex tear of medial meniscus of left knee as current injury X95.273O       Past Medical History:        Diagnosis Date    Kidney stones     last stone:    Obesity     UTI (urinary tract infection)        Past Surgical History:        Procedure Laterality Date    APPENDECTOMY  06/10/2020    BLADDER SURGERY N/A 08/15/2021    CYSTOSCOPY RETROGRADE PYELOGRAM STENT INSERTION performed by Shekhar Miguel MD at [address] Right 11/02/2021    RIGHT CYSTOSCOPY STENT REMOVAL performed by Shekhar Miguel MD at [address]      CT URETERAL STENT PLACEMENT THRU EXIST TRACT      CYSTOSCOPY Right 10/01/2021    RIGHT CYSTOSCOPY, RIGHT URETEROSCOPY STENT EXCHANGE, RETROGRADE PYELOGRAMS, LITHOTRIPSY, STONE EXTRACTION performed by Shekhar Miguel MD at [address] (CERVIX STATUS UNKNOWN)  06/10/2020       Social History:    Social History     Tobacco Use    Smoking status: Never    Smokeless tobacco: Never   Substance Use Topics    Alcohol use: No                                Counseling given: Not Answered      Vital Signs (Current): There were no vitals filed for this visit.                                            BP Readings from Last 3 Encounters:   02/21/23 120/85   02/17/23 (!) 128/90   07/08/22 112/88       NPO Status:                                                                                 BMI:   Wt Readings from Last 3 Encounters:   02/21/23 (!) 365 lb (165.6 kg)   02/21/23 (!) 365 lb 12.8 oz (165.9 kg)   01/11/23 (!) 358 lb 9.6 oz (162.7 kg)     There is no height or weight on file to calculate BMI.    CBC:   Lab Results   Component Value Date/Time    WBC 7.0 05/16/2022 02:33 PM    RBC 4.48 05/16/2022 02:33 PM    HGB 12.7 05/16/2022 02:33 PM    HCT 38.1 05/16/2022 02:33 PM    MCV 84.9 05/16/2022 02:33 PM    RDW 13.9 05/16/2022 02:33 PM     05/16/2022 02:33 PM       CMP:   Lab Results   Component Value Date/Time     05/16/2022 02:33 PM    K 4.3 05/16/2022 02:33 PM     05/16/2022 02:33 PM    CO2 21 05/16/2022 02:33 PM    BUN 13 05/16/2022 02:33 PM    CREATININE 0.7 05/16/2022 02:33 PM    GFRAA >60 05/16/2022 02:33 PM    AGRATIO 1.7 05/16/2022 02:33 PM    LABGLOM >60 05/16/2022 02:33 PM    GLUCOSE 95 08/16/2021 06:44 AM    PROT 7.0 05/16/2022 02:33 PM    CALCIUM 8.9 05/16/2022 02:33 PM    BILITOT 0.8 05/16/2022 02:33 PM    ALKPHOS 78 05/16/2022 02:33 PM    AST 16 05/16/2022 02:33 PM    ALT 14 05/16/2022 02:33 PM       POC Tests: No results for input(s): POCGLU, POCNA, POCK, POCCL, POCBUN, POCHEMO, POCHCT in the last 72 hours. Coags:   Lab Results   Component Value Date/Time    PROTIME 12.7 08/15/2021 08:32 AM    INR 0.98 08/15/2021 08:32 AM    APTT 27.1 08/15/2021 08:32 AM       HCG (If Applicable):   Lab Results   Component Value Date    PREGTESTUR NEGATIVE 11/02/2021        ABGs: No results found for: PHART, PO2ART, SPN5FZZ, NHZ8TUM, BEART, J2CWSMRQ     Type & Screen (If Applicable):  No results found for: LABABO, LABRH    Drug/Infectious Status (If Applicable):  No results found for: HIV, HEPCAB    COVID-19 Screening (If Applicable):   Lab Results   Component Value Date/Time    COVID19 NOT DETECTED 10/28/2021 11:47 AM           Anesthesia Evaluation  Patient summary reviewed  Airway: Mallampati: III          Dental:          Pulmonary: breath sounds clear to auscultation                             Cardiovascular:          ECG reviewed  Rhythm: regular                   ROS comment: Sinus rhythm with sinus arrhythmia with occasional premature ventricular complexes   Otherwise normal ECG   When compared with ECG of 14-AUG-2021 23:34,   premature ventricular complexes are now present   Confirmed by Sharmaine Maloney MD, Juan Alves ([id number]) on 8/15/2021 3:50:23 PM      Neuro/Psych:   (+) depression/anxiety             GI/Hepatic/Renal:   (+) renal disease: kidney stones, morbid obesity          Endo/Other:                     Abdominal:   (+) obese,           Vascular: Other Findings:             Anesthesia Plan      general     ASA 3     (Chart review only 2/22/23)  Induction: intravenous.                             IRIS Ahumada CRNA   2/22/2023

## 2023-02-26 ASSESSMENT — ENCOUNTER SYMPTOMS
SHORTNESS OF BREATH: 0
COUGH: 0
WHEEZING: 0

## 2023-02-27 NOTE — PROGRESS NOTES
2/27/23 - Notified patient surgery @ [facility] on 2/28/23 @ [phone], arrival 0945. Understanding verbalized.

## 2023-02-27 NOTE — ANESTHESIA PRE PROCEDURE
Department of Anesthesiology  Preprocedure Note       Name:  Arabella Silverman   Age:  39 y.o.  :  1980                                          MRN:  [id number]         Date:  2023      Surgeon: Jean Alicea):  Mariam Vega DO    Procedure: Procedure(s):  LEFT KNEE ARTHROSCOPY WITH PARTIAL MEDIAL MENISCECTOMY    Medications prior to admission:   Prior to Admission medications    Medication Sig Start Date End Date Taking? Authorizing Provider   estradiol (VIVELLE) 0.075 MG/24HR  23   Historical Provider, MD       Current medications:    No current facility-administered medications for this encounter. Current Outpatient Medications   Medication Sig Dispense Refill    estradiol (VIVELLE) 0.075 MG/24HR          Allergies:     Allergies   Allergen Reactions    Fentanyl Itching       Problem List:    Patient Active Problem List   Diagnosis Code    High-risk pregnancy O09.90    Morbid obesity ([facility] [address] E66.01    Advanced maternal age (AMA) in pregnancy [id number]    41 weeks gestation of pregnancy O48.0, Z3A.41    Patella-femoral syndrome M22.2X9    Iron deficiency anemia D50.9    Vitamin D deficiency E55.9    Knee pain, chronic M25.569, G89.29    Oral herpes simplex infection B00.2    Patellar subluxation S83.003A    Knee MCL sprain S83.419A    Knee LCL sprain S83.429A    Anxiety F41.9    Obesity, morbid, BMI 50 or higher ([facility] [address] E66.01    Left knee pain M25.562    Chronic pain of both knees M25.561, M25.562, G89.29    Calculus of gallbladder with chronic cholecystitis without obstruction K80.10    Complex tear of medial meniscus of left knee as current injury Z92.808F       Past Medical History:        Diagnosis Date    Kidney stones     last stone:    Obesity     UTI (urinary tract infection)        Past Surgical History:        Procedure Laterality Date    APPENDECTOMY  06/10/2020    BLADDER SURGERY N/A 08/15/2021    CYSTOSCOPY RETROGRADE PYELOGRAM STENT INSERTION performed by Julius Flowers MD at [address] Right 11/02/2021    RIGHT CYSTOSCOPY STENT REMOVAL performed by Julius Flowers MD at [address]      CT URETERAL STENT PLACEMENT THRU EXIST TRACT      CYSTOSCOPY Right 10/01/2021    RIGHT CYSTOSCOPY, RIGHT URETEROSCOPY STENT EXCHANGE, RETROGRADE PYELOGRAMS, LITHOTRIPSY, STONE EXTRACTION performed by Julius Flowers MD at [address] (CERVIX STATUS UNKNOWN)  06/10/2020       Social History:    Social History     Tobacco Use    Smoking status: Never    Smokeless tobacco: Never   Substance Use Topics    Alcohol use: No                                Counseling given: Not Answered      Vital Signs (Current):   Vitals:    02/21/23 1449   Weight: (!) 365 lb (165.6 kg)   Height: 5' 7\" (1.702 m)                                              BP Readings from Last 3 Encounters:   02/21/23 120/85   02/17/23 (!) 128/90   07/08/22 112/88       NPO Status:                                                                                 BMI:   Wt Readings from Last 3 Encounters:   02/21/23 (!) 365 lb (165.6 kg)   02/21/23 (!) 365 lb 12.8 oz (165.9 kg)   01/11/23 (!) 358 lb 9.6 oz (162.7 kg)     Body mass index is 57.17 kg/m².     CBC:   Lab Results   Component Value Date/Time    WBC 7.0 05/16/2022 02:33 PM    RBC 4.48 05/16/2022 02:33 PM    HGB 12.7 05/16/2022 02:33 PM    HCT 38.1 05/16/2022 02:33 PM    MCV 84.9 05/16/2022 02:33 PM    RDW 13.9 05/16/2022 02:33 PM     05/16/2022 02:33 PM       CMP:   Lab Results   Component Value Date/Time     05/16/2022 02:33 PM    K 4.3 05/16/2022 02:33 PM     05/16/2022 02:33 PM    CO2 21 05/16/2022 02:33 PM    BUN 13 05/16/2022 02:33 PM    CREATININE 0.7 05/16/2022 02:33 PM    GFRAA >60 05/16/2022 02:33 PM    AGRATIO 1.7 05/16/2022 02:33 PM    LABGLOM >60 05/16/2022 02:33 PM    GLUCOSE 95 08/16/2021 06:44 AM    PROT 7.0 05/16/2022 02:33 PM    CALCIUM 8.9 05/16/2022 02:33 PM    BILITOT 0.8 05/16/2022 02:33 PM    ALKPHOS 78 05/16/2022 02:33 PM    AST 16 05/16/2022 02:33 PM    ALT 14 05/16/2022 02:33 PM       POC Tests: No results for input(s): POCGLU, POCNA, POCK, POCCL, POCBUN, POCHEMO, POCHCT in the last 72 hours. Coags:   Lab Results   Component Value Date/Time    PROTIME 12.7 08/15/2021 08:32 AM    INR 0.98 08/15/2021 08:32 AM    APTT 27.1 08/15/2021 08:32 AM       HCG (If Applicable):   Lab Results   Component Value Date    PREGTESTUR NEGATIVE 11/02/2021        ABGs: No results found for: PHART, PO2ART, VZD1VKK, MHE4DVF, BEART, D2FBTUXC     Type & Screen (If Applicable):  No results found for: LABABO, LABRH    Drug/Infectious Status (If Applicable):  No results found for: HIV, HEPCAB    COVID-19 Screening (If Applicable):   Lab Results   Component Value Date/Time    COVID19 NOT DETECTED 10/28/2021 11:47 AM           Anesthesia Evaluation  Patient summary reviewed  Airway: Mallampati: II          Dental:          Pulmonary: breath sounds clear to auscultation                             Cardiovascular:          ECG reviewed  Rhythm: regular                   ROS comment: Sinus rhythm with sinus arrhythmia with occasional premature ventricular complexes   Otherwise normal ECG   When compared with ECG of 14-AUG-2021 23:34,   premature ventricular complexes are now present   Confirmed by Ramiro Perez MD, Creola Boxer ([id number]) on 8/15/2021 3:50:23 PM      Neuro/Psych:   (+) depression/anxiety             GI/Hepatic/Renal:   (+) renal disease: kidney stones, morbid obesity          Endo/Other:                     Abdominal:   (+) obese,           Vascular: Other Findings:           Anesthesia Plan      general     ASA 3     (Pt tolerated propofol and fentanyl in the past with pre op benadryl and steroid. Plan same)  Induction: intravenous. MIPS: Postoperative opioids intended. Anesthetic plan and risks discussed with patient. Plan discussed with CRNA.     Attending anesthesiologist reviewed and agrees with Preprocedure content                IRIS Cannon - CRNA   2/27/2023

## 2023-02-28 ENCOUNTER — ANESTHESIA (OUTPATIENT)
Dept: OPERATING ROOM | Age: 43
End: 2023-02-28
Payer: COMMERCIAL

## 2023-02-28 ENCOUNTER — HOSPITAL ENCOUNTER (OUTPATIENT)
Age: 43
Setting detail: OUTPATIENT SURGERY
Discharge: HOME | End: 2023-02-28
Attending: STUDENT IN AN ORGANIZED HEALTH CARE EDUCATION/TRAINING PROGRAM | Admitting: STUDENT IN AN ORGANIZED HEALTH CARE EDUCATION/TRAINING PROGRAM
Payer: COMMERCIAL

## 2023-02-28 VITALS
HEIGHT: 67 IN | BODY MASS INDEX: 45.99 KG/M2 | RESPIRATION RATE: 16 BRPM | HEART RATE: 75 BPM | SYSTOLIC BLOOD PRESSURE: 148 MMHG | TEMPERATURE: 97.6 F | DIASTOLIC BLOOD PRESSURE: 86 MMHG | OXYGEN SATURATION: 97 % | WEIGHT: 293 LBS

## 2023-02-28 DIAGNOSIS — S83.232D COMPLEX TEAR OF MEDIAL MENISCUS OF LEFT KNEE AS CURRENT INJURY, SUBSEQUENT ENCOUNTER: Primary | ICD-10-CM

## 2023-02-28 PROCEDURE — 6360000002 HC RX W HCPCS

## 2023-02-28 PROCEDURE — 3600000004 HC SURGERY LEVEL 4 BASE: Performed by: STUDENT IN AN ORGANIZED HEALTH CARE EDUCATION/TRAINING PROGRAM

## 2023-02-28 PROCEDURE — 6370000000 HC RX 637 (ALT 250 FOR IP): Performed by: ANESTHESIOLOGY

## 2023-02-28 PROCEDURE — 2580000003 HC RX 258: Performed by: STUDENT IN AN ORGANIZED HEALTH CARE EDUCATION/TRAINING PROGRAM

## 2023-02-28 PROCEDURE — 2500000003 HC RX 250 WO HCPCS

## 2023-02-28 PROCEDURE — 7100000000 HC PACU RECOVERY - FIRST 15 MIN: Performed by: STUDENT IN AN ORGANIZED HEALTH CARE EDUCATION/TRAINING PROGRAM

## 2023-02-28 PROCEDURE — 6360000002 HC RX W HCPCS: Performed by: STUDENT IN AN ORGANIZED HEALTH CARE EDUCATION/TRAINING PROGRAM

## 2023-02-28 PROCEDURE — 6370000000 HC RX 637 (ALT 250 FOR IP): Performed by: STUDENT IN AN ORGANIZED HEALTH CARE EDUCATION/TRAINING PROGRAM

## 2023-02-28 PROCEDURE — 6360000002 HC RX W HCPCS: Performed by: ANESTHESIOLOGY

## 2023-02-28 PROCEDURE — 7100000010 HC PHASE II RECOVERY - FIRST 15 MIN: Performed by: STUDENT IN AN ORGANIZED HEALTH CARE EDUCATION/TRAINING PROGRAM

## 2023-02-28 PROCEDURE — 7100000011 HC PHASE II RECOVERY - ADDTL 15 MIN: Performed by: STUDENT IN AN ORGANIZED HEALTH CARE EDUCATION/TRAINING PROGRAM

## 2023-02-28 PROCEDURE — 3600000014 HC SURGERY LEVEL 4 ADDTL 15MIN: Performed by: STUDENT IN AN ORGANIZED HEALTH CARE EDUCATION/TRAINING PROGRAM

## 2023-02-28 PROCEDURE — 7100000001 HC PACU RECOVERY - ADDTL 15 MIN: Performed by: STUDENT IN AN ORGANIZED HEALTH CARE EDUCATION/TRAINING PROGRAM

## 2023-02-28 PROCEDURE — 2709999900 HC NON-CHARGEABLE SUPPLY: Performed by: STUDENT IN AN ORGANIZED HEALTH CARE EDUCATION/TRAINING PROGRAM

## 2023-02-28 PROCEDURE — 3700000001 HC ADD 15 MINUTES (ANESTHESIA): Performed by: STUDENT IN AN ORGANIZED HEALTH CARE EDUCATION/TRAINING PROGRAM

## 2023-02-28 PROCEDURE — 3700000000 HC ANESTHESIA ATTENDED CARE: Performed by: STUDENT IN AN ORGANIZED HEALTH CARE EDUCATION/TRAINING PROGRAM

## 2023-02-28 PROCEDURE — 2720000010 HC SURG SUPPLY STERILE: Performed by: STUDENT IN AN ORGANIZED HEALTH CARE EDUCATION/TRAINING PROGRAM

## 2023-02-28 RX ORDER — LIDOCAINE HYDROCHLORIDE 20 MG/ML
INJECTION, SOLUTION EPIDURAL; INFILTRATION; INTRACAUDAL; PERINEURAL PRN
Status: DISCONTINUED | OUTPATIENT
Start: 2023-02-28 | End: 2023-02-28 | Stop reason: SDUPTHER

## 2023-02-28 RX ORDER — ONDANSETRON 2 MG/ML
4 INJECTION INTRAMUSCULAR; INTRAVENOUS
Status: DISCONTINUED | OUTPATIENT
Start: 2023-02-28 | End: 2023-02-28 | Stop reason: HOSPADM

## 2023-02-28 RX ORDER — ONDANSETRON 2 MG/ML
4 INJECTION INTRAMUSCULAR; INTRAVENOUS EVERY 6 HOURS PRN
Status: CANCELLED | OUTPATIENT
Start: 2023-02-28

## 2023-02-28 RX ORDER — SODIUM CHLORIDE 9 MG/ML
25 INJECTION, SOLUTION INTRAVENOUS PRN
Status: DISCONTINUED | OUTPATIENT
Start: 2023-02-28 | End: 2023-02-28 | Stop reason: HOSPADM

## 2023-02-28 RX ORDER — ACETAMINOPHEN 500 MG
1000 TABLET ORAL ONCE
Status: COMPLETED | OUTPATIENT
Start: 2023-02-28 | End: 2023-02-28

## 2023-02-28 RX ORDER — SODIUM CHLORIDE 0.9 % (FLUSH) 0.9 %
5-40 SYRINGE (ML) INJECTION EVERY 12 HOURS SCHEDULED
Status: DISCONTINUED | OUTPATIENT
Start: 2023-02-28 | End: 2023-02-28 | Stop reason: HOSPADM

## 2023-02-28 RX ORDER — OXYCODONE HYDROCHLORIDE 5 MG/1
10 TABLET ORAL EVERY 4 HOURS PRN
Status: CANCELLED | OUTPATIENT
Start: 2023-02-28

## 2023-02-28 RX ORDER — KETOROLAC TROMETHAMINE 30 MG/ML
30 INJECTION, SOLUTION INTRAMUSCULAR; INTRAVENOUS EVERY 6 HOURS
Status: CANCELLED | OUTPATIENT
Start: 2023-02-28 | End: 2023-03-03

## 2023-02-28 RX ORDER — PROPOFOL 10 MG/ML
INJECTION, EMULSION INTRAVENOUS PRN
Status: DISCONTINUED | OUTPATIENT
Start: 2023-02-28 | End: 2023-02-28 | Stop reason: SDUPTHER

## 2023-02-28 RX ORDER — SODIUM CHLORIDE, SODIUM LACTATE, POTASSIUM CHLORIDE, CALCIUM CHLORIDE 600; 310; 30; 20 MG/100ML; MG/100ML; MG/100ML; MG/100ML
INJECTION, SOLUTION INTRAVENOUS CONTINUOUS
Status: CANCELLED | OUTPATIENT
Start: 2023-02-28

## 2023-02-28 RX ORDER — HYDROCODONE BITARTRATE AND ACETAMINOPHEN 5; 325 MG/1; MG/1
1 TABLET ORAL ONCE
Status: COMPLETED | OUTPATIENT
Start: 2023-02-28 | End: 2023-02-28

## 2023-02-28 RX ORDER — DIPHENHYDRAMINE HYDROCHLORIDE 50 MG/ML
INJECTION INTRAMUSCULAR; INTRAVENOUS PRN
Status: DISCONTINUED | OUTPATIENT
Start: 2023-02-28 | End: 2023-02-28 | Stop reason: SDUPTHER

## 2023-02-28 RX ORDER — ONDANSETRON 2 MG/ML
INJECTION INTRAMUSCULAR; INTRAVENOUS PRN
Status: DISCONTINUED | OUTPATIENT
Start: 2023-02-28 | End: 2023-02-28 | Stop reason: SDUPTHER

## 2023-02-28 RX ORDER — DEXAMETHASONE SODIUM PHOSPHATE 4 MG/ML
INJECTION, SOLUTION INTRA-ARTICULAR; INTRALESIONAL; INTRAMUSCULAR; INTRAVENOUS; SOFT TISSUE PRN
Status: DISCONTINUED | OUTPATIENT
Start: 2023-02-28 | End: 2023-02-28 | Stop reason: SDUPTHER

## 2023-02-28 RX ORDER — OXYCODONE HYDROCHLORIDE 5 MG/1
5 TABLET ORAL EVERY 4 HOURS PRN
Status: CANCELLED | OUTPATIENT
Start: 2023-02-28

## 2023-02-28 RX ORDER — SODIUM CHLORIDE 0.9 % (FLUSH) 0.9 %
5-40 SYRINGE (ML) INJECTION PRN
Status: CANCELLED | OUTPATIENT
Start: 2023-02-28

## 2023-02-28 RX ORDER — HYDRALAZINE HYDROCHLORIDE 20 MG/ML
10 INJECTION INTRAMUSCULAR; INTRAVENOUS
Status: DISCONTINUED | OUTPATIENT
Start: 2023-02-28 | End: 2023-02-28 | Stop reason: HOSPADM

## 2023-02-28 RX ORDER — FENTANYL CITRATE 50 UG/ML
50 INJECTION, SOLUTION INTRAMUSCULAR; INTRAVENOUS EVERY 5 MIN PRN
Status: DISCONTINUED | OUTPATIENT
Start: 2023-02-28 | End: 2023-02-28 | Stop reason: HOSPADM

## 2023-02-28 RX ORDER — ASPIRIN 325 MG
325 TABLET ORAL DAILY
Qty: 30 TABLET | Refills: 3 | Status: SHIPPED | OUTPATIENT
Start: 2023-02-28

## 2023-02-28 RX ORDER — SODIUM CHLORIDE 0.9 % (FLUSH) 0.9 %
5-40 SYRINGE (ML) INJECTION PRN
Status: DISCONTINUED | OUTPATIENT
Start: 2023-02-28 | End: 2023-02-28 | Stop reason: HOSPADM

## 2023-02-28 RX ORDER — SODIUM CHLORIDE 9 MG/ML
INJECTION, SOLUTION INTRAVENOUS PRN
Status: DISCONTINUED | OUTPATIENT
Start: 2023-02-28 | End: 2023-02-28 | Stop reason: HOSPADM

## 2023-02-28 RX ORDER — FENTANYL CITRATE 50 UG/ML
25 INJECTION, SOLUTION INTRAMUSCULAR; INTRAVENOUS EVERY 5 MIN PRN
Status: DISCONTINUED | OUTPATIENT
Start: 2023-02-28 | End: 2023-02-28 | Stop reason: HOSPADM

## 2023-02-28 RX ORDER — ROCURONIUM BROMIDE 10 MG/ML
INJECTION, SOLUTION INTRAVENOUS PRN
Status: DISCONTINUED | OUTPATIENT
Start: 2023-02-28 | End: 2023-02-28 | Stop reason: SDUPTHER

## 2023-02-28 RX ORDER — HYDROCODONE BITARTRATE AND ACETAMINOPHEN 5; 325 MG/1; MG/1
1 TABLET ORAL EVERY 6 HOURS PRN
Qty: 28 TABLET | Refills: 0 | Status: SHIPPED | OUTPATIENT
Start: 2023-02-28 | End: 2023-03-07

## 2023-02-28 RX ORDER — CYCLOBENZAPRINE HCL 5 MG
5 TABLET ORAL 2 TIMES DAILY PRN
Qty: 30 TABLET | Refills: 0 | Status: SHIPPED | OUTPATIENT
Start: 2023-02-28 | End: 2023-03-10

## 2023-02-28 RX ORDER — ONDANSETRON 4 MG/1
4 TABLET, ORALLY DISINTEGRATING ORAL EVERY 8 HOURS PRN
Status: CANCELLED | OUTPATIENT
Start: 2023-02-28

## 2023-02-28 RX ORDER — ONDANSETRON 4 MG/1
4 TABLET, ORALLY DISINTEGRATING ORAL 3 TIMES DAILY PRN
Qty: 21 TABLET | Refills: 0 | Status: SHIPPED | OUTPATIENT
Start: 2023-02-28

## 2023-02-28 RX ORDER — SODIUM CHLORIDE 0.9 % (FLUSH) 0.9 %
5-40 SYRINGE (ML) INJECTION EVERY 12 HOURS SCHEDULED
Status: CANCELLED | OUTPATIENT
Start: 2023-02-28

## 2023-02-28 RX ORDER — SODIUM CHLORIDE, SODIUM LACTATE, POTASSIUM CHLORIDE, CALCIUM CHLORIDE 600; 310; 30; 20 MG/100ML; MG/100ML; MG/100ML; MG/100ML
INJECTION, SOLUTION INTRAVENOUS CONTINUOUS
Status: DISCONTINUED | OUTPATIENT
Start: 2023-02-28 | End: 2023-02-28 | Stop reason: HOSPADM

## 2023-02-28 RX ORDER — LABETALOL HYDROCHLORIDE 5 MG/ML
10 INJECTION, SOLUTION INTRAVENOUS
Status: DISCONTINUED | OUTPATIENT
Start: 2023-02-28 | End: 2023-02-28 | Stop reason: HOSPADM

## 2023-02-28 RX ORDER — SODIUM CHLORIDE 9 MG/ML
INJECTION, SOLUTION INTRAVENOUS PRN
Status: CANCELLED | OUTPATIENT
Start: 2023-02-28

## 2023-02-28 RX ADMIN — HYDROMORPHONE HYDROCHLORIDE 0.5 MG: 1 INJECTION, SOLUTION INTRAMUSCULAR; INTRAVENOUS; SUBCUTANEOUS at 15:05

## 2023-02-28 RX ADMIN — ACETAMINOPHEN 1000 MG: 500 TABLET ORAL at 11:21

## 2023-02-28 RX ADMIN — LIDOCAINE HYDROCHLORIDE 100 MG: 20 INJECTION, SOLUTION EPIDURAL; INFILTRATION; INTRACAUDAL; PERINEURAL at 12:57

## 2023-02-28 RX ADMIN — CEFAZOLIN SODIUM 3000 MG: 10 INJECTION, POWDER, FOR SOLUTION INTRAVENOUS at 12:34

## 2023-02-28 RX ADMIN — HYDROMORPHONE HYDROCHLORIDE 0.5 MG: 1 INJECTION, SOLUTION INTRAMUSCULAR; INTRAVENOUS; SUBCUTANEOUS at 14:43

## 2023-02-28 RX ADMIN — ROCURONIUM BROMIDE 50 MG: 10 INJECTION, SOLUTION INTRAVENOUS at 12:20

## 2023-02-28 RX ADMIN — HYDROCODONE BITARTRATE AND ACETAMINOPHEN 1 TABLET: 5; 325 TABLET ORAL at 15:56

## 2023-02-28 RX ADMIN — PROPOFOL 150 MG: 10 INJECTION, EMULSION INTRAVENOUS at 12:20

## 2023-02-28 RX ADMIN — DIPHENHYDRAMINE HYDROCHLORIDE 25 MG: 50 INJECTION, SOLUTION INTRAMUSCULAR; INTRAVENOUS at 12:07

## 2023-02-28 RX ADMIN — ONDANSETRON 4 MG: 2 INJECTION INTRAMUSCULAR; INTRAVENOUS at 12:41

## 2023-02-28 RX ADMIN — SUGAMMADEX 300 MG: 100 INJECTION, SOLUTION INTRAVENOUS at 13:48

## 2023-02-28 RX ADMIN — SODIUM CHLORIDE, POTASSIUM CHLORIDE, SODIUM LACTATE AND CALCIUM CHLORIDE: 600; 310; 30; 20 INJECTION, SOLUTION INTRAVENOUS at 10:58

## 2023-02-28 RX ADMIN — HYDROMORPHONE HYDROCHLORIDE 0.5 MG: 1 INJECTION, SOLUTION INTRAMUSCULAR; INTRAVENOUS; SUBCUTANEOUS at 12:41

## 2023-02-28 RX ADMIN — ROCURONIUM BROMIDE 10 MG: 10 INJECTION, SOLUTION INTRAVENOUS at 13:35

## 2023-02-28 RX ADMIN — DEXAMETHASONE SODIUM PHOSPHATE 8 MG: 4 INJECTION, SOLUTION INTRAMUSCULAR; INTRAVENOUS at 12:41

## 2023-02-28 ASSESSMENT — PAIN DESCRIPTION - ORIENTATION
ORIENTATION: LEFT

## 2023-02-28 ASSESSMENT — PAIN DESCRIPTION - DESCRIPTORS
DESCRIPTORS: DISCOMFORT;ACHING
DESCRIPTORS: ACHING
DESCRIPTORS: DISCOMFORT;ACHING

## 2023-02-28 ASSESSMENT — PAIN - FUNCTIONAL ASSESSMENT
PAIN_FUNCTIONAL_ASSESSMENT: ACTIVITIES ARE NOT PREVENTED
PAIN_FUNCTIONAL_ASSESSMENT: PREVENTS OR INTERFERES SOME ACTIVE ACTIVITIES AND ADLS
PAIN_FUNCTIONAL_ASSESSMENT: PREVENTS OR INTERFERES SOME ACTIVE ACTIVITIES AND ADLS
PAIN_FUNCTIONAL_ASSESSMENT: 0-10

## 2023-02-28 ASSESSMENT — PAIN SCALES - GENERAL
PAINLEVEL_OUTOF10: 4
PAINLEVEL_OUTOF10: 4
PAINLEVEL_OUTOF10: 6
PAINLEVEL_OUTOF10: 0
PAINLEVEL_OUTOF10: 10

## 2023-02-28 ASSESSMENT — ENCOUNTER SYMPTOMS
SORE THROAT: 0
NAUSEA: 0
COLOR CHANGE: 0
WHEEZING: 0
BACK PAIN: 0
SHORTNESS OF BREATH: 0
VOICE CHANGE: 0
COUGH: 0
VOMITING: 0

## 2023-02-28 ASSESSMENT — PAIN DESCRIPTION - FREQUENCY
FREQUENCY: CONTINUOUS

## 2023-02-28 ASSESSMENT — PAIN DESCRIPTION - PAIN TYPE
TYPE: SURGICAL PAIN

## 2023-02-28 ASSESSMENT — PAIN DESCRIPTION - ONSET
ONSET: ON-GOING
ONSET: PROGRESSIVE
ONSET: ON-GOING

## 2023-02-28 ASSESSMENT — PAIN DESCRIPTION - LOCATION
LOCATION: KNEE

## 2023-02-28 NOTE — ANESTHESIA POSTPROCEDURE EVALUATION
Department of Anesthesiology  Postprocedure Note    Patient: Amanda Moore  MRN: [id number]  YOB: 1980  Date of evaluation: 2/28/2023      Procedure Summary     Date: 02/28/23 Room / Location: [address] [place] [address]    Anesthesia Start: 1207 Anesthesia Stop: 0291    Procedure: LEFT KNEE ARTHROSCOPY WITH PARTIAL MEDIAL MENISCECTOMY (Left) Diagnosis:       Complex tear of medial meniscus of left knee as current injury, initial encounter      Chondromalacia of left knee      (Complex tear of medial meniscus of left knee as current injury, initial encounter [S83.232A])      (Chondromalacia of left knee [Y33.608])    Surgeons: Иван Thompson DO Responsible Provider: Raghu Samuel MD    Anesthesia Type: general ASA Status: 3          Anesthesia Type: No value filed.     Shelia Phase I: Shelia Score: 7    Shelia Phase II:        Anesthesia Post Evaluation    Patient location during evaluation: PACU  Patient participation: complete - patient participated  Level of consciousness: awake and alert  Pain score: 1  Airway patency: patent  Nausea & Vomiting: no nausea and no vomiting  Complications: no  Cardiovascular status: blood pressure returned to baseline and hemodynamically stable  Respiratory status: acceptable, face mask, nonlabored ventilation and spontaneous ventilation  Hydration status: euvolemic  Multimodal analgesia pain management approach

## 2023-02-28 NOTE — PROGRESS NOTES
[id number]- Discharge instructions provided to patient and parents. Understanding voiced. 1642- Out to car per wheelchair. Home with parents.

## 2023-02-28 NOTE — H&P
2/20/2023   No chief complaint on file. Updated HPI: Patient is here to undergo left knee arthroscopy as previously discussed in office. Patient has no questions about today's planned procedure and has no changes to her health history. She denies any constitutional symptoms today. Previous HPI (2/17/2023):                             Aaron Sauceda is a 39 y.o. female  referred by LAW Rivas for evaluation and treatment of left knee pain. Patient denies any specific injury but states over the last approximate 9 months she had sudden and progressive medial sided left knee pain with significant mechanical symptoms. She was seen in our office by Tonya Campbell who performed a cortisone injection which she states provided no relief to the knee, however I was unable to find any note that states that she had a cortisone injection. She also recently completed an MRI of the left knee and is here to discuss the MRI findings and treatment course. The pain's location is medial joint line of left knee. she describes the symptoms as aching, sharp and stabbing. Symptoms improve with rest. Symptoms worsen with deep knee bending, getting up from a chair, weight bearing, sitting for prolonged periods of time, twisting activities. Again, patient denies prior injury to knee, denies numbness, tingling, fever, chills. Patient admits to occasional swelling and effusion with intermittent but prominent and painful mechanical symptoms. Denies instability. Worse with increased activity. Better with rest,     Treatment thus far has included rest, activity modifications, cortisone injection, oral medications without significant relief. Here today to discuss diagnosis and treatment options. Is affecting ADLs. Pain is 8/10 at it's worst.    Outside reports reviewed: Previous notes as well as MRI reviewed.     Patient's medications, allergies, past medical, surgical, social and family histories were reviewed and updated as appropriate. Medical History  Patient's medications, allergies, past medical, surgical, social and family histories were reviewed and updated as appropriate. Past Medical History:   Diagnosis Date    Kidney stones     last stone:11/21    Obesity     UTI (urinary tract infection)      Past Surgical History:   Procedure Laterality Date    APPENDECTOMY  06/10/2020    BLADDER SURGERY N/A 08/15/2021    CYSTOSCOPY RETROGRADE PYELOGRAM STENT INSERTION performed by Cody Borjas MD at [address] [place] [address] Right 11/02/2021    RIGHT CYSTOSCOPY STENT REMOVAL performed by Cody Borjas MD at [address] [place] [address] EXIST TRACT      CYSTOSCOPY Right 10/01/2021    RIGHT CYSTOSCOPY, RIGHT URETEROSCOPY STENT EXCHANGE, RETROGRADE PYELOGRAMS, LITHOTRIPSY, STONE EXTRACTION performed by Cody Borjas MD at [facility] ([address] [place] [address])  06/10/2020     History reviewed. No pertinent family history.   Social History     Socioeconomic History    Marital status: Single     Spouse name: None    Number of children: None    Years of education: None    Highest education level: None   Tobacco Use    Smoking status: Never    Smokeless tobacco: Never   Vaping Use    Vaping Use: Never used   Substance and Sexual Activity    Alcohol use: No    Drug use: No    Sexual activity: Yes     Partners: Male     Comment: \"I am but we don't\"     Social Determinants of Health     Financial Resource Strain: Low Risk     Difficulty of Paying Living Expenses: Not very hard   Food Insecurity: No Food Insecurity    Worried About Running Out of Food in the Last Year: Never true    Ran Out of Food in the Last Year: Never true   Transportation Needs: Unknown    Lack of Transportation (Non-Medical): No   Housing Stability: Unknown    Unstable Housing in the Last Year: No     Current Facility-Administered Medications   Medication Dose Route Frequency Provider Last Rate Last Admin    lactated ringers IV soln infusion   IntraVENous Continuous Keke Porras,         acetaminophen (TYLENOL) tablet 1,000 mg  1,000 mg Oral Once Lindia Barragan, DO        lactated ringers IV soln infusion   IntraVENous Continuous Lindia Barragan, DO        sodium chloride flush 0.9 % injection 5-40 mL  5-40 mL IntraVENous 2 times per day Lindia Barragan, DO        sodium chloride flush 0.9 % injection 5-40 mL  5-40 mL IntraVENous PRN Lindia Barragan, DO        0.9 % sodium chloride infusion   IntraVENous PRN Lindia Barragan, DO        ceFAZolin (ANCEF) 3,000 mg in sodium chloride 0.9 % 100 mL IVPB  3,000 mg IntraVENous On Call to [address], DO         Allergies   Allergen Reactions    Fentanyl Itching         Review of Systems   Constitutional:  Positive for activity change. Negative for fatigue and fever. HENT:  Negative for sneezing, sore throat and voice change. Respiratory:  Negative for cough, shortness of breath and wheezing. Cardiovascular:  Negative for leg swelling. Gastrointestinal:  Negative for nausea and vomiting. Musculoskeletal:  Positive for arthralgias and joint swelling. Negative for back pain, gait problem, myalgias, neck pain and neck stiffness. Skin:  Negative for color change, rash and wound. Neurological:  Negative for weakness and numbness. Psychiatric/Behavioral:  Negative for behavioral problems, confusion and self-injury. Examination:  General Exam:  Vitals: BP (!) 157/111   Pulse 70   Temp 97.2 °F (36.2 °C) (Temporal)   Resp 18   Ht 5' 7\" (1.702 m)   Wt (!) 365 lb (165.6 kg)   LMP 07/09/2018 (Approximate)   SpO2 96%   BMI 57.17 kg/m²    Physical Exam  Constitutional:       General: She is not in acute distress. Appearance: Normal appearance. She is obese. She is not ill-appearing. HENT:      Head: Normocephalic and atraumatic.    Eyes: General:         Right eye: No discharge. Left eye: No discharge. Extraocular Movements: Extraocular movements intact. Cardiovascular:      Pulses: Normal pulses. Pulmonary:      Effort: Pulmonary effort is normal.      Breath sounds: Normal breath sounds. Musculoskeletal:         General: Swelling and tenderness present. No deformity or signs of injury. Cervical back: Normal range of motion. Right hip: Normal.      Left hip: Normal.      Right upper leg: Normal.      Left upper leg: Normal.      Left knee: Swelling and bony tenderness present. No deformity, effusion, erythema, ecchymosis, lacerations or crepitus. Normal range of motion. Tenderness present over the medial joint line. No lateral joint line, MCL or LCL tenderness. No LCL laxity, MCL laxity, ACL laxity or PCL laxity. Normal alignment, normal meniscus and normal patellar mobility. Normal pulse. Instability Tests: Anterior drawer test negative. Posterior drawer test negative. Medial Nika test negative and lateral Nika test negative. Right lower leg: Normal. No edema. Left lower leg: Normal. No edema. Right ankle: Normal.      Left ankle: Normal.      Right foot: Normal.      Left foot: Normal.   Skin:     General: Skin is warm and dry. Capillary Refill: Capillary refill takes less than 2 seconds. Neurological:      General: No focal deficit present. Mental Status: She is alert and oriented to person, place, and time. Mental status is at baseline. Gait: Gait normal.   Psychiatric:         Mood and Affect: Mood normal.         Behavior: Behavior normal.      LEFT KNEE EXAMINATION       OBSERVATION / INSPECTION     Gait: Mildly antalgic    Alignment: Neutral     Scars: None     Muscle atrophy: None    Effusion: Minimal    Warmth: None     Discoloration: none       TENDERNESS / CREPITUS (T / C):       Patella - / -     Lateral joint line - / -  Medial joint line  + / +     Peripatellar lateral + / -  Peripatellar medial  - / -     Medial plica - / -  Lateral plica - / -    Patellar tendon - / -   Prepatellar Bursa - / -     Popliteal fossa - / -    Gastrocnemius - / -    Quadricep - / -   Quad tendon - / -      Tibial tubercle - / -     Thigh/hamstring - / -  Pes anserine/HS - / -     ITB - / -     Tibia - / -   Fibula - / -    Tib/fib joint - / -     MFC - / -    LFC - / -     MCL: Proximal - / -  Distal - / -    LCL - / -    MCL - / -      ROM: (* = pain)  PASSIVE  ACTIVE     Left :    0 / 115*  0 / 105*      Right :    0 / 125  0 / 125      PATELLOFEMORAL EXAMINATION:    See above noted areas of tenderness. Patella position     Subluxation / dislocation: Centered     Sup. / Inf; Normal     Crepitus (PF): Mild    Patellar Mobility:     Medial-lateral: Normal     Superior-inferior: Normal     Inferior tilt Normal     Patellar tendon: Normal     Lateral tilt: Normal     J-sign: None     Patellofemoral grind: Mild pain         MENISCAL SIGNS:     Pain on terminal extension: -    Pain on terminal flexion: Positive for pain only    Nikas maneuver: Positive for pain and mechanical symptoms    Squat: Positive for pain and mechanical symptoms      LIGAMENT EXAMINATION:    ACL / Lachman: normal (-1 to 2mm)      PCL-Post.  drawer: normal 0 to 2mm    MCL- Valgus: normal 0 to 2mm    LCL- Varus:  normal 0 to 2mm      STRENGTH: (* = with pain) PAINFUL SIDE    Quadricep 5/5    Hamstrin/5      EXTREMITY NEURO-VASCULAR EXAMINATION:     Sensation:  Grossly intact to light touch all dermatomal regions. Motor Function:  Fully intact motor function at hip, knee, foot and ankle      DTRs;  quadriceps and  achilles 2+. No clonus and downgoing Babinski. Vascular status:  DP and PT pulses 2+, brisk capillary refill, symmetric.       Diagnostic testing:  MRI left knee without contrast images were reviewed by myself and discussed with the patient:  MENISCI: Horizontal oblique tear contacting the inferior articular surface of   the posterior horn of the medial meniscus with extrusion of the body from the   joint line. No lateral meniscus tear identified. CRUCIATE LIGAMENTS: The anterior and posterior cruciate ligaments are intact. EXTENSOR MECHANISM: The quadriceps and patellar tendons are intact. The   medial and lateral patellar retinacula are intact. LATERAL COLLATERAL LIGAMENT COMPLEX: The popliteus tendon, biceps femoris   tendon, fibular collateral ligament and iliotibial band are intact. MEDIAL COLLATERAL LIGAMENT COMPLEX: The superficial and deep components of   the medial collateral ligament are intact. KNEE JOINT: Moderate joint effusion. No popliteal cyst.  Mild to moderate   medial compartment degenerative changes. Mild lateral and patellofemoral   compartment degenerative changes. No joint body identified. BONE MARROW: No fracture or dislocation. No suspicious marrow   space-occupying lesion. Office Procedures:  Orders Placed This Encounter   Procedures    Diet NPO Exceptions are: Sips of Water with Meds     Standing Status:   Standing     Number of Occurrences:   1     Order Specific Question:   Exceptions are     Answer:   Sips of Water with Meds    Vital signs per unit routine     Standing Status:   Standing     Number of Occurrences:   1    Verify surgical site confirmation documentation completed     Standing Status:   Standing     Number of Occurrences:   1    Verify discontinuation of anticoagulants/antiplatelets unless otherwise specified by physician     Standing Status:   Standing     Number of Occurrences:   1    Nursing communication- beta-blocker     If the patient is on beta-blocker medication document date and time of last dose. If the patient has not taken the beta-blocker medication within 24 hours and there is no order to give the patient a beta-blocker, notify the physician.      Standing Status:   Standing     Number of Occurrences:   1    Void on call to OR     Standing Status:   Standing     Number of Occurrences:   1    Verify informed consent     Standing Status:   Standing     Number of Occurrences:   1    Verify pre-procedure history and physical completed     Standing Status:   Standing     Number of Occurrences:   1    Place intermittent pneumatic compression device     Standing Status:   Standing     Number of Occurrences:   1    Full Code     Standing Status:   Standing     Number of Occurrences:   1         Assessment and Plan    A: Left knee medial meniscus tear   Left knee osteoarthritis    P:   I discussed with the patient continuing conservative measures versus operative intervention and what both treatment plans would encompass, as well as the benefits and negatives of both interventions. Due to the patient's significant symptoms including pain and mechanical symptoms that are significant and painful, they would like to proceed with surgical intervention. I did have a thorough conversation with the patient about the MRI findings in regards to the osteoarthritis and explained that her underlying issues with pain may be related to the arthritis rather than the meniscus in this can definitely limit her ability to regain function and to improve after the surgery. I did explain that this is something that is unknown going to the surgery and I do not want her to have any false impressions of her ability to improve with a knee arthroscopy due to having underlying osteoarthritis and she voiced understanding. I explained that the surgery is based around improving the issues related to the meniscus and that the osteoarthritis will be cleaned up but this is a minimal procedure and the arthroscopy is not indicated for this portion of her pathology and she voiced understanding.   I also explained that her body habitus will likely limit some of her postoperative course and can cause increased pain in the postoperative period and limit her overall function in the postoperative period and she voiced understanding.    I discussed surgical intervention in the form of:    Left knee arthroscopic:  Partial medial meniscectomy versus medial meniscus repair  Chondroplasty      I explained risks, benefits, possible complications of the procedure and answered all questions for the patient.  This will include but is not limited to need for further surgery, blood loss, continued pain and instability, neurovascular injury, infection, extremity loss, death.  I explained postoperative rehabilitation protocol and expectations with the patient today.  The patient understands and consents to the procedure.    Patient will follow up with their primary care physician prior to surgical treatment for preoperative clearance.    The patient was counseled at length about the risks of taylor Covid-19 during their perioperative period and any recovery window from their procedure.  The patient was made aware that taylor Covid-19  may worsen their prognosis for recovering from their procedure  and lend to a higher morbidity and/or mortality risk.  All material risks, benefits, and reasonable alternatives including postponing the procedure were discussed. The patient does wish to proceed with the procedure at this time.        Pt seen and examined, No change in H+P.     Electronically signed by Jhonny Crawford DO on 2/28/2023 at 10:28 AM

## 2023-02-28 NOTE — PROGRESS NOTES
1402: Pt arrived to PACU from OR. Monitors applied, alarms on. Surgical dressing clean and dry. Report obtained from [facility]. and [facility]. 1415: Pt very sleepy from anesthesia. Pt repositioned in bed to help stir awake. Pt moans out and states she hurts, but is unable to remain awake at this time. Easily drifts off to sleep, breathing even and unlabored. 1443: Pt waking up, very painful. Able to remain awake at this time and follow commands. Pt medicated for pain. 1455: Pt tolerating ice chips at this time. 1520: Pt transferred to [facility] room 13. Report given bedside to [facility].

## 2023-02-28 NOTE — OP NOTE
Operative Note      Patient: Cam Buenrostro  YOB: 1980  MRN: [id number]    Date of Procedure: 2/28/2023    Pre-Op Diagnosis: Complex tear of medial meniscus of left knee as current injury, initial encounter [S83.232A]  Chondromalacia of left knee [M94.262]    Post-Op Diagnosis: Same       Procedure(s):  LEFT KNEE ARTHROSCOPY WITH PARTIAL MEDIAL MENISCECTOMY    Surgeon(s):  Ac Chamberlain DO    Assistant:   * No surgical staff found *    Anesthesia: General    Estimated Blood Loss (mL): less than 50     Complications: None    Specimens:   * No specimens in log *    Implants:  * No implants in log *      Drains: * No LDAs found *    Findings: Please see dictated op report    Detailed Description of Procedure:   DATE OF PROCEDURE: 2-    PREOPERATIVE DIAGNOSIS: Left knee medial meniscus tear. POSTOPERATIVE DIAGNOSES:  1. Left knee medial meniscus tear   2. Tricompartmental osteoarthritis    PROCEDURES:  1. Diagnostic and operative arthroscopy of left knee with partial medial meniscectomy and chondroplasty    ATTENDING SURGEON:  Ac Chamberlain DO    ANESTHESIA:  General    ESTIMATED BLOOD LOSS:  5 mL. TOTAL TOURNIQUET TIME: None    INDICATIONS FOR PROCEDURE:  The patient is a 39year-old female who has had progressive left knee pain over the last 9 months. She does not remember a specific injury but is had progressive medial sided knee pain with mechanical symptoms. She was seen in our office in had a cortisone injection performed by LAW Martinez with no relief of her symptoms. MRI was subsequently obtained, which showed evidence of medial meniscus tear as well as osteoarthritis. Given the persistent symptoms including mechanical symptoms despite conservative treatment and with positive MRI findings, I did discuss with the patient surgical treatment.   However, I did discuss with the patient continuing conservative measures such as weight loss and formal therapy as well as gel injections due to her body habitus and obvious arthritis on the x-rays and MRI but they did not want to pursue these because of the significant pain and mechanical symptoms. I also discussed the possible need for a total knee replacement due to the osteoarthritic changes in the patient wanted to proceed with a knee arthroscopy in hopes of prolonging any type of total knee replacement. I had a very long conversation with the patient regarding conservative measures as well as knee arthroscopy and a total knee replacement. I explained that she does have findings on physical exam that correlate to the positive MRI findings of the medial meniscus being problematic but I explained that I did believe a significant portion of her symptoms are from the arthritis but I would be willing to proceed with a knee arthroscopy if she understood that she may not improve and could actually be made worse by the knee arthroscopy and she voiced understanding and wanted to proceed. I explained the risks, benefits and possible complications of the procedure to the patientand after answering all of their questions, they consented to undergo the above procedure. REPORT OF PROCEDURE:  The patient was seen and evaluated in the preoperative holding area where the left lower extremity was signed in their presence. At this point, care of the patient was turned over to anesthesia team, and they were transported back to the operative suite. They were placed supine on the operating table with the left lower extremity in leg chung. General anesthesia was applied and once adequate anesthesia was obtained, the left lower extremity was prepped and draped in usual sterile fashion. Preoperative antibiotics were administered. At this point, a time-out was performed and all in attendance were in agreement.     I exsanguinated the left lower extremity using Esmarch and did tied this at the top of the thigh but did not place any type of tourniquet due to the patient's body habitus.  I then made a standard anterolateral portal incision using a 15 blade scalpel and inserted arthroscope into the knee joint through the anterolateral portal.  At this point, I performed a standard diagnostic arthroscopy evaluating the patellofemoral compartment  and the medial gutter.  No loose bodies were seen.  I then placed the arthroscope in the medial compartment and using a spinal needle, determined the placement of my anteromedial portal  in a standard fashion above the medial meniscus.  The skin was incised using a  15 blade scalpel and portal was obtained.      Within the medial compartment, I found the medial meniscus to have undersurface, near full-thickness tear at the posterior horn that was horizontal in nature, approximately 5 mm in width but was stable upon probing.  I then used the arthroscopic shaver to debride the undersurface of the meniscus at the tear site as well as the arthroscopic wand to help debride this area and shrink down the opening at the tear site.  I then used the arthroscopic shaver to remove  all loose meniscus debris as well as cartilage debris.  I then used a combination of the arthroscopic shaver as well as the the Arthrocare wand to perform a chondroplasty to the femoral side of the knee which demonstrated grade 3, 4 chondromalacia.  The tibial side demonstrated diffuse grade 1 and 2 changes that did not warrant any type of intervention.  There were several areas on the femoral side of the lesion that demonstrated loose chondral flaps that I debrided to smooth, stable edges.  Upon probing, chondral edges as well as the the medial meniscus was stable after the meniscal and chondral debridement was performed.     I then examined the femoral notch.  The ligamentum mucosum was taken down using the arthroscopic shaver.  No loose bodies were seen.  The ACL and PCL were found to be intact with probing.     I then turned my attention to  the lateral  compartment of the knee. Within the lateral compartment, overall, I found the cartilage on the femoral condyle and tibial plateau to be intact. Upon examination of the cartilage, there was no significant cartilage lesion but there was sporadic areas of grade 1 cartilage changes about the tibial side but overall the cartilage was intact. The lateral meniscus was probed and found to be Intact with the exception of some degenerative fraying of the body and posterior horn of the lateral meniscus. Upon probing, the lateral meniscus was stable throughout and therefore no intervention on the meniscus was undertaken. I then examined the lateral gutter and found no loose bodies. I then returned to the patellofemoral compartment. Within the patellofemoral compartment, I found the cartilage on the patella and the femoral trochlea to have significant grade IV and V chondromalacia. I then performed a chondroplasty to the trochlea as well as a small section of the correlating patella removing all loose cartilage tissue and creating stable borders of the cartilage lesion. The trochlear lesion was the majority of the centralized portion at the articulation of the patella. Final images were obtained inside the knee. I then reevaluated the medial and lateral gutters of the knee and found there to be no additional pathology present. I also return to the suprapatellar pouch and found no loose body or pathology. Arthroscopic instrumentation was then removed from the knee. Excess fluid was then drained from the knee. Skin incisions were then closed using 3-0 nylon suture. The Esmarch was removed from the leg and adequate hemostasis was maintained. I then applied a sterile soft dressing to the left lower extremity. The patient was then awakened from anesthesia and transported to PACU in stable condition. The patient appeared to have tolerated the procedure well.       PROGNOSIS:   At this point, the patient will be discharged to home with  mg daily, Flexeril, Percocet, as well as Zofran. They are to maintain the brace at all times locked in full extension unless performing hygiene or working with physical therapy and can be weightbearing as tolerated but should avoid any exercise, excessive ambulation or weightbearing, or deep knee bending. I will see them back in the office in two weeks for suture removal and we will continue to monitor their progress in the outpatient setting for resolution of their symptoms. We will discuss outpatient therapy as warranted after the 2-week postop visit    Use ice 20 to 30 minutes at a time every few hours as needed. You may remove your dressings and Ace wrap 2 days after surgery. You can replace Band-Aids and gauze daily as needed. Rewrapped the Ace wrap for support as needed. It is okay to allow the stitches to get wet in the shower and gently clean the leg with soap and water. Use the pain medication as prescribed. Once your pain level is more manageable you can transition to over-the-counter pain medications.       Kal Allen DO     Electronically signed by Kal Allen DO on 2/28/2023 at 2:08 PM

## 2023-02-28 NOTE — DISCHARGE INSTRUCTIONS
[facility]  [phone]    Do not drive, work around [address] or use equipment. Do not drink any alcoholic beverages. Do not smoke while alone. Avoid making important decisions. Plan to spend a quiet, relaxed evening @ home. Resume normal activities as you begin to feel better. Eat lightly for your first meal, then gradually increase your diet to what is normal for you. In case of nausea, avoid food and drink only clear liquids. Resume food as nausea ceases. Notify your surgeon if you experience fever, chills, large amount of bleeding, difficulty breathing, persistent nausea and vomiting or any other disturbing problem. Call for a follow-up appointment with your surgeon.

## 2023-03-08 ENCOUNTER — TELEPHONE (OUTPATIENT)
Dept: ORTHOPEDIC SURGERY | Age: 43
End: 2023-03-08

## 2023-03-08 DIAGNOSIS — Z09 POSTOP CHECK: Primary | ICD-10-CM

## 2023-03-08 RX ORDER — HYDROCODONE BITARTRATE AND ACETAMINOPHEN 5; 325 MG/1; MG/1
1 TABLET ORAL EVERY 6 HOURS PRN
Qty: 28 TABLET | Refills: 0 | Status: SHIPPED | OUTPATIENT
Start: 2023-03-08 | End: 2023-03-15

## 2023-03-08 NOTE — TELEPHONE ENCOUNTER
Pt called stating that today she will be out of pain medication. She states that she is still having issues sleeping at night. She would like to request medication to sleep at night and attempt to switch to OTC during the day. She has not needed the muscle relaxer but is taking the ASA as prescribed. [employer].

## 2023-03-15 ENCOUNTER — OFFICE VISIT (OUTPATIENT)
Dept: ORTHOPEDIC SURGERY | Age: 43
End: 2023-03-15

## 2023-03-15 VITALS
WEIGHT: 293 LBS | HEIGHT: 67 IN | TEMPERATURE: 97.4 F | BODY MASS INDEX: 45.99 KG/M2 | OXYGEN SATURATION: 98 % | HEART RATE: 79 BPM

## 2023-03-15 DIAGNOSIS — Z09 POSTOP CHECK: Primary | ICD-10-CM

## 2023-03-15 NOTE — PROGRESS NOTES
Date of surgery: 2-     Procedure:  Diagnostic and operative arthroscopy of left knee with partial medial meniscectomy and chondroplasty      History:  Patient is here in follow up regarding their 2-week follow-up appointment for left knee scope as described above    Patient is doing well. They have 3/10 pain. They deny chest pain, SOB, calf pain,fever,wound drainage. No other issues. Patient denies any constitutional symptoms. Patient states she had issues sleeping at night due to pain. She has not been using ice. Overall doing okay and states she feels about the same compared to preoperatively. Patient states they have been compliant with restrictions. Patient has been taking  mg daily for DVT prophylaxis     Physical:   Patient demonstrates appropriate mood and affect. Left lower extremity exam:   The incisions are healing well and are clean, dry, intact, and nontender with no erythema. They have not edema, the Leg compartments are soft . There are No cords or calf tenderness. No significant calf/ankle edema. They are neurovascularly intact distally.      Sutures removed without issue    Range of motion of the left knee demonstrates: Full, active range of motion without significant pain    No areas of tenderness to palpation    Negative Simeon's    Imaging:   No new orthopedic imaging     Impression: Status post above, doing well        Plan:   -Intraoperative arthroscopic imaging reviewed with patient  -Patient to do gentle range of motion exercises to work on range of motion and strengthening.  -continue the PT protocol   -Patient is weight-bear as tolerated, range of motion as tolerated  -Patient should avoid any soaking of the wound or deep knee bending while weightbearing  -rest/elevation as needed  -DVT prophylaxis:  mg daily  -I once again had a long conversation the patient regarding her arthritic changes of her knee and explained that she would likely be a total knee replacement candidate in the future but at this time we will focus on her healing from this current surgery and seeing how well her pain is controlled when she is through the postoperative period and she voices her understanding.   I will be happy to provide her with a referral to a orthopedic surgeon who be willing to do her surgery once we reached that point and patient voices her understanding.  -No refills needed   -f/u in 4 week(s)  -f/u sooner prn any issues

## 2023-03-15 NOTE — PROGRESS NOTES
Patient returns to office today to follow up for L Knee   DOS: 2/28/2023  Patient reports 3/10 pain  Silverio Moshedouglasclarice on knee 3/6/2023  After walking too long knee swelled on Sunday   Having trouble sleeping, increased pain at night     Suture removal performed in office today without incident  Incision site is well approximated, clean, dry, normal colored healing skin and without discharge/drainage

## 2023-03-20 ENCOUNTER — TELEPHONE (OUTPATIENT)
Dept: ORTHOPEDIC SURGERY | Age: 43
End: 2023-03-20

## 2023-03-20 DIAGNOSIS — Z09 POSTOP CHECK: Primary | ICD-10-CM

## 2023-03-20 RX ORDER — OXYCODONE HYDROCHLORIDE AND ACETAMINOPHEN 5; 325 MG/1; MG/1
1 TABLET ORAL EVERY 6 HOURS PRN
Qty: 28 TABLET | Refills: 0 | Status: SHIPPED | OUTPATIENT
Start: 2023-03-20 | End: 2023-03-27

## 2023-03-20 NOTE — TELEPHONE ENCOUNTER
I tried several times to reach the patient at the provided number but the phone number did not ring in the mailbox stated that it was full. I will provide the patient with a short-term prescription of Percocet. She needs to make sure she continues to ice and rest but also to stay active. There is any questions I be happy to discuss this with the patient.

## 2023-03-20 NOTE — TELEPHONE ENCOUNTER
Patient stated she is still in a lot of pain. She had a fall on march 7, and is concerned that she injured something. She also stated she is no able to sleep at night due to the pain, and the medication is not helping. She would like someone to call her back after 3 pm today. She is requesting a call back at [phone].

## 2023-03-21 NOTE — TELEPHONE ENCOUNTER
MICHAEL to notify her that a prescription was sent to her pharmacy and that Dr. Carlo Dasilva tried to call her but was unable to reach her. If she still has concerns to call the office.

## 2023-04-14 PROBLEM — Z09 POSTOP CHECK: Status: RESOLVED | Noted: 2023-03-15 | Resolved: 2023-04-14

## 2023-05-04 LAB — MAMMOGRAPHY, EXTERNAL: NORMAL

## 2023-05-05 ENCOUNTER — TELEPHONE (OUTPATIENT)
Dept: ORTHOPEDIC SURGERY | Age: 43
End: 2023-05-05

## 2023-05-05 DIAGNOSIS — Z09 POSTOP CHECK: Primary | ICD-10-CM

## 2023-05-05 RX ORDER — CYCLOBENZAPRINE HCL 5 MG
5 TABLET ORAL 2 TIMES DAILY PRN
Qty: 30 TABLET | Refills: 0 | Status: SHIPPED | OUTPATIENT
Start: 2023-05-05 | End: 2023-05-15

## 2023-05-26 ENCOUNTER — TELEPHONE (OUTPATIENT)
Dept: ORTHOPEDIC SURGERY | Age: 43
End: 2023-05-26

## 2023-05-26 ENCOUNTER — OFFICE VISIT (OUTPATIENT)
Dept: ORTHOPEDIC SURGERY | Age: 43
End: 2023-05-26

## 2023-05-26 VITALS — SYSTOLIC BLOOD PRESSURE: 132 MMHG | DIASTOLIC BLOOD PRESSURE: 88 MMHG | HEART RATE: 70 BPM | OXYGEN SATURATION: 96 %

## 2023-05-26 DIAGNOSIS — Z09 POSTOP CHECK: Primary | ICD-10-CM

## 2023-05-26 RX ORDER — FLUCONAZOLE 150 MG/1
1 TABLET ORAL DAILY
COMMUNITY
Start: 2023-05-19

## 2023-05-26 NOTE — PROGRESS NOTES
Date of surgery: 2-     Procedure:  Diagnostic and operative arthroscopy of left knee with partial medial meniscectomy and chondroplasty      History:  Patient is here in follow up regarding their 12-week follow-up appointment for left knee scope as described above    Patient is doing well. They have 5/10 pain, which is slightly increased from her last visit although she states that she does feel that she is slowly and minimally improved compared to then. They deny chest pain, SOB, calf pain,fever,wound drainage. Issues with cramping have improved but she continues have difficulty sleeping as well as climbing stairs and prolonged walking. Patient denies any constitutional symptoms. No new injury since last being seen. Patient states they have been compliant with restrictions. No changes in her health history. Patient is somewhat more emotional today and we did have a very thorough and long conversation about her personal life which has been somewhat disturbing and worrisome. Patient does promise that she is in a much safer living situation at this time and is working on transitioning out of her current living situation due to safety concerns. Physical:   Patient demonstrates appropriate mood and affect. Left lower extremity exam:   The incisions are well-healed and are clean, dry, intact, and nontender with no erythema. They have not edema, the Leg compartments are soft . There are No cords or calf tenderness. No significant calf/ankle edema. They are neurovascularly intact distally. Range of motion of the left knee demonstrates: Full, active range of motion without significant pain    Essentially unchanged pain with palpation of medial joint line    Negative Simeon's    Imaging:   No new orthopedic imaging    Previous imaging:  3 views of a left knee in a skeletally mature patient demonstrates no acute osseous abnormalities.   Patient demonstrates sclerosis with bone-on-bone

## 2023-05-26 NOTE — PROGRESS NOTES
Patient is here for 12 week post op check on left knee arthroscopy with partial medial meniscectomy. Still having trouble with sleeping, affected by pain. Pain rated 5/10 today. No new injuries. Still has trouble climbing stairs and walking.

## 2023-06-27 DIAGNOSIS — S83.232A COMPLEX TEAR OF MEDIAL MENISCUS OF LEFT KNEE AS CURRENT INJURY, INITIAL ENCOUNTER: ICD-10-CM

## 2023-06-27 DIAGNOSIS — M17.12 PRIMARY OSTEOARTHRITIS OF LEFT KNEE: Primary | ICD-10-CM

## 2023-06-27 DIAGNOSIS — M25.562 LEFT KNEE PAIN, UNSPECIFIED CHRONICITY: ICD-10-CM

## 2023-06-27 PROBLEM — Z09 POSTOP CHECK: Status: RESOLVED | Noted: 2023-03-15 | Resolved: 2023-06-27

## 2023-07-11 ENCOUNTER — HOSPITAL ENCOUNTER (OUTPATIENT)
Dept: PHYSICAL THERAPY | Age: 43
Setting detail: THERAPIES SERIES
Discharge: HOME | End: 2023-07-11
Payer: COMMERCIAL

## 2023-07-11 PROCEDURE — 97110 THERAPEUTIC EXERCISES: CPT

## 2023-07-11 PROCEDURE — 97162 PT EVAL MOD COMPLEX 30 MIN: CPT

## 2023-07-11 ASSESSMENT — PAIN SCALES - GENERAL: PAINLEVEL_OUTOF10: 6

## 2023-07-11 NOTE — PROGRESS NOTES
Responsibilities: Yes  Ambulation Assistance: Independent  Transfer Assistance: Independent  Active : Yes    OBJECTIVE EXAMINATION   Restrictions:             Review of Systems:  Vision: Within Functional Limits  Hearing: Within functional limits  Overall Orientation Status: Within Normal Limits    VBI Screening / Lumbar Screening:        Regional Screen:         Observations:       Palpation:   Left Knee Palpation: severe tender quad tendon, prox patellar tendon, medial and lateral joint lines. Ambulation/Gait (if applicable):       Balance Screen:       Neuro Screen:  Left Myotomes  Right Myotomes                  Left AROM  Right AROM         AROM LLE (degrees)  L Knee Flexion (0-145): 95 deg  L Knee Extension (0): lacking 8 deg. AROM RLE (degrees)  R Knee Flexion (0-145): 120  R Knee Extension (0): lack 2     Left PROM  Right PROM       Lacking 3 ext,  100flexion             Left Strength  Right Strength          Myotomes intact. Pain with MMT knee extension and flexion but 5/5. Hip abd 4/5 with hip pain. Joint Mobility (if applicable):        Special Tests:   Special Tests for Knee  Special Tests:  (neg SLR (some HS tightness), neg CHRISTEN, FADIR, neg lig instability at knee.)    Balance/Gait Assessment(s) Performed: Additional Finding(s) (if applicable):           ASSESSMENT     Impression: Assessment: Pt presents with complaints of L knee pain onset years ago and has progressively worsened since. She had arthroscopic surgery, partial medial menisectomy2/28/2023. She has continued pain and limited standing, walking, transfers, squatting. She states she is ready for a TKA at this time, but needs to have therapy and injections first.  She has pain limited ROM in flexion and extension of her L knee, tightness at her hips, tenderness to palpation at her L knee joint line, quad and patellar tendons.          Statement of Medical Necessity: Physical Therapy is both indicated and

## 2023-07-11 NOTE — FLOWSHEET NOTE
Outpatient Physical Therapy  [place]           [x] Phone: [phone]   Fax: [phone]  [facility]           [] Phone: [phone]   Fax: [phone]        Physical Therapy Daily Treatment Note  Date:  2023    Patient Name:  Rabia Dial    :  1980  MRN: [id number]  Restrictions/Precautions: No data recorded      Diagnosis:   Primary osteoarthritis of left knee [M17.12]  Left knee pain, unspecified chronicity [M25.562]  Complex tear of medial meniscus of left knee as current injury, initial encounter [R74.298O] Diagnosis: L knee OA, medial meniscus tear  Date of Injury/Surgery:   Treatment Diagnosis:  L knee pain, OA. Insurance/Certification information: [facility]  Referring Physician:  DO Dr. Reji Almeida   PCP: No primary care provider on file. Next Doctor Visit:    Plan of care signed (Y/N):  n  Outcome Measure: Koos 16  Visit# / total visits:  1 /  Pain level: 6/10   Goals:     Patient goals: decrease knee pain, have knee replacement. Long Term Goals  Time Frame for Long Term Goals : 6 weeks  Long Term Goal 1: I in home program.  Long Term Goal 2: up/down steps with minimal pain. Long Term Goal 3: stand/walk work shift with minimal pain. Long Term Goal 4: transfer sit/stand with minimal pain. Summary of Evaluation:  Assessment: Pt presents with complaints of L knee pain onset years ago and has progressively worsened since. She had arthroscopic surgery, partial medial menisectomy2023. She has continued pain and limited standing, walking, transfers, squatting. She states she is ready for a TKA at this time, but needs to have therapy and injections first.  She has pain limited ROM in flexion and extension of her L knee, tightness at her hips, tenderness to palpation at her L knee joint line, quad and patellar tendons. Subjective:  See eval         Any changes in Ambulatory Summary Sheet?   None        Objective:  See eval           Exercises:

## 2023-07-21 ENCOUNTER — HOSPITAL ENCOUNTER (OUTPATIENT)
Dept: PHYSICAL THERAPY | Age: 43
Setting detail: THERAPIES SERIES
Discharge: HOME | End: 2023-07-21
Payer: COMMERCIAL

## 2023-07-21 PROCEDURE — 97110 THERAPEUTIC EXERCISES: CPT

## 2023-07-21 PROCEDURE — 97530 THERAPEUTIC ACTIVITIES: CPT

## 2023-07-21 NOTE — FLOWSHEET NOTE
of Care Interventions:  [x] Therapeutic Exercise  [] Modalities:  [x] Therapeutic Activity     [] Ultrasound  [] Estim  [] Gait Training      [] Cervical Traction [] Lumbar Traction  [x] Neuromuscular Re-education    [] Cold/hotpack [] Iontophoresis   [x] Instruction in HEP      [] Vasopneumatic   [] Dry Needling    [x] Manual Therapy               [] Aquatic Therapy              Electronically signed by:  Oliva Schofield PTA, 7/21/2023, 3:28 PM

## 2023-07-24 ENCOUNTER — HOSPITAL ENCOUNTER (OUTPATIENT)
Dept: PHYSICAL THERAPY | Age: 43
Setting detail: THERAPIES SERIES
Discharge: HOME | End: 2023-07-24
Payer: COMMERCIAL

## 2023-07-24 PROCEDURE — 97110 THERAPEUTIC EXERCISES: CPT

## 2023-07-24 NOTE — FLOWSHEET NOTE
35' / 35'   2 TE      Next Progress Note due:        Plan of Care Interventions:  [x] Therapeutic Exercise  [] Modalities:  [x] Therapeutic Activity     [] Ultrasound  [] Estim  [] Gait Training      [] Cervical Traction [] Lumbar Traction  [x] Neuromuscular Re-education    [] Cold/hotpack [] Iontophoresis   [x] Instruction in HEP      [] Vasopneumatic   [] Dry Needling    [x] Manual Therapy               [] Aquatic Therapy              Electronically signed by:  Fabiola Arizmendi PTA, 7/24/2023, 4:02 PM

## 2023-07-27 ENCOUNTER — HOSPITAL ENCOUNTER (OUTPATIENT)
Dept: PHYSICAL THERAPY | Age: 43
Discharge: HOME | End: 2023-07-27

## 2023-07-27 NOTE — FLOWSHEET NOTE
Physical Therapy  Cancellation/No-show Note  Patient Name:  Conrado Farnsworth  :  1980   Date:  2023  Cancelled visits to date: 1  No-shows to date: 0    For today's appointment patient:  [x]  Cancelled  []  Rescheduled appointment  []  No-show     Reason given by patient:  []  Patient ill  []  Conflicting appointment  []  No transportation    []  Conflict with work  [x]  No reason given  []  Other:     Comments:  \"Patient called to reschedule. No reason given. \"    Electronically signed by:  Shravan Brandt PTA,

## 2023-07-31 ENCOUNTER — HOSPITAL ENCOUNTER (OUTPATIENT)
Dept: PHYSICAL THERAPY | Age: 43
Setting detail: THERAPIES SERIES
Discharge: HOME | End: 2023-07-31
Payer: COMMERCIAL

## 2023-07-31 PROCEDURE — 97140 MANUAL THERAPY 1/> REGIONS: CPT

## 2023-07-31 PROCEDURE — 97530 THERAPEUTIC ACTIVITIES: CPT

## 2023-07-31 PROCEDURE — 97110 THERAPEUTIC EXERCISES: CPT

## 2023-07-31 NOTE — FLOWSHEET NOTE
Other:    1 1           Total for episode of care   7              Timed Code/Total Treatment Minutes:    39'/39'    1 TE    1 TA    1 Man      Next Progress Note due:        Plan of Care Interventions:  [x] Therapeutic Exercise  [] Modalities:  [x] Therapeutic Activity     [] Ultrasound  [] Estim  [] Gait Training      [] Cervical Traction [] Lumbar Traction  [x] Neuromuscular Re-education    [] Cold/hotpack [] Iontophoresis   [x] Instruction in HEP      [] Vasopneumatic   [] Dry Needling    [x] Manual Therapy               [] Aquatic Therapy              Electronically signed by:  Chilango Trinidad PTA, 7/31/2023, 3:47 PM

## 2023-08-03 ENCOUNTER — HOSPITAL ENCOUNTER (OUTPATIENT)
Dept: PHYSICAL THERAPY | Age: 43
Setting detail: THERAPIES SERIES
Discharge: HOME | End: 2023-08-03
Payer: COMMERCIAL

## 2023-08-03 PROCEDURE — 97140 MANUAL THERAPY 1/> REGIONS: CPT

## 2023-08-03 PROCEDURE — 97110 THERAPEUTIC EXERCISES: CPT

## 2023-08-03 NOTE — FLOWSHEET NOTE
Outpatient Physical Therapy  [place]           [x] Phone: [phone]   Fax: [phone]  [facility]           [] Phone: [phone]   Fax: [phone]        Physical Therapy Daily Treatment Note  Date:  8/3/2023    Patient Name:  Palomo Poon    :  1980  MRN: [id number]  Restrictions/Precautions: No data recorded      Diagnosis:   Primary osteoarthritis of left knee [M17.12]  Left knee pain, unspecified chronicity [M25.562]  Complex tear of medial meniscus of left knee as current injury, initial encounter [H77.196K]    Date of Injury/Surgery:   Treatment Diagnosis:     Insurance/Certification information:    Referring Physician:  Brigette Burks DO     PCP: No primary care provider on file. Next Doctor Visit:    Plan of care signed (Y/N):  n  Outcome Measure: Koos 16  Visit# / total visits:  5  Pain level: 0/10   Goals:      Patient goals: decrease knee pain, have knee replacement. Long Term Goals  Time Frame for Long Term Goals : 6 weeks  Long Term Goal 1: I in home program.  Long Term Goal 2: up/down steps with minimal pain. 8/3/23 moderate  Long Term Goal 3: stand/walk work shift with minimal pain. 8/3/23  Mod most days  Long Term Goal 4: transfer sit/stand with minimal pain. 8/3/23 Min/mod        Summary of Evaluation:  Assessment: Pt presents with complaints of L knee pain onset years ago and has progressively worsened since. She had arthroscopic surgery, partial medial menisectomy2023. She has continued pain and limited standing, walking, transfers, squatting. She states she is ready for a TKA at this time, but needs to have therapy and injections first.  She has pain limited ROM in flexion and extension of her L knee, tightness at her hips, tenderness to palpation at her L knee joint line, quad and patellar tendons. Subjective:   Pt arrives to tx session reporting 0/10 pain. Does feel better at times. Any changes in Ambulatory Summary Sheet?

## 2023-08-09 ENCOUNTER — HOSPITAL ENCOUNTER (OUTPATIENT)
Dept: PHYSICAL THERAPY | Age: 43
Discharge: HOME | End: 2023-08-09

## 2023-08-09 NOTE — FLOWSHEET NOTE
Physical Therapy  Cancellation/No-show Note  Patient Name:  Nasir Arthur  :  1980   Date:  2023  Cancelled visits to date: 2  No-shows to date: 0    For today's appointment patient:  [x]  Cancelled  []  Rescheduled appointment  []  No-show     Reason given by patient:  []  Patient ill  []  Conflicting appointment  []  No transportation    []  Conflict with work  [x]  No reason given  []  Other:     Comments:  Patient appeared to be a no show for today's appt. When contacted patient, she stated she had called earlier, cancelled her appt and rescheduled for tomorrow morning. No appt was found on schedule. Transferred patient to front office to get back on the schedule.     Electronically signed by:  Jhonny Fraser PTA    4:18 PM  2023

## 2023-08-10 ENCOUNTER — HOSPITAL ENCOUNTER (OUTPATIENT)
Dept: PHYSICAL THERAPY | Age: 43
Setting detail: THERAPIES SERIES
Discharge: HOME | End: 2023-08-10
Payer: COMMERCIAL

## 2023-08-10 PROCEDURE — 97110 THERAPEUTIC EXERCISES: CPT

## 2023-08-10 NOTE — FLOWSHEET NOTE
Outpatient Physical Therapy  [place]           [x] Phone: [phone]   Fax: [phone]  [facility]           [] Phone: [phone]   Fax: [phone]        Physical Therapy Daily Treatment Note  Date:  8/10/2023    Patient Name:  Adelita Shukla    :  1980  MRN: [id number]  Restrictions/Precautions: No data recorded      Diagnosis:   Primary osteoarthritis of left knee [M17.12]  Left knee pain, unspecified chronicity [M25.562]  Complex tear of medial meniscus of left knee as current injury, initial encounter [Z02.606Q]    Date of Injury/Surgery:   Treatment Diagnosis:     Insurance/Certification information:    Referring Physician:  Jeffrey Maldonado DO     PCP: No primary care provider on file. Next Doctor Visit:    Plan of care signed (Y/N):  n  Outcome Measure: Koos 16  Visit# / total visits:  6  Pain level: 1-2/10   Goals:      Patient goals: decrease knee pain, have knee replacement. Long Term Goals  Time Frame for Long Term Goals : 6 weeks  Long Term Goal 1: I in home program.  Long Term Goal 2: up/down steps with minimal pain. 8/3/23 moderate  Long Term Goal 3: stand/walk work shift with minimal pain. 8/3/23  Mod most days  Long Term Goal 4: transfer sit/stand with minimal pain. 8/3/23 Min/mod        Summary of Evaluation:  Assessment: Pt presents with complaints of L knee pain onset years ago and has progressively worsened since. She had arthroscopic surgery, partial medial menisectomy2023. She has continued pain and limited standing, walking, transfers, squatting. She states she is ready for a TKA at this time, but needs to have therapy and injections first.  She has pain limited ROM in flexion and extension of her L knee, tightness at her hips, tenderness to palpation at her L knee joint line, quad and patellar tendons. Subjective:   Pt arrives to tx session reporting 1-2/10 pain. Any changes in Ambulatory Summary Sheet?   None      Objective:

## 2023-11-01 ENCOUNTER — OFFICE VISIT (OUTPATIENT)
Dept: ORTHOPEDIC SURGERY | Age: 43
End: 2023-11-01
Payer: COMMERCIAL

## 2023-11-01 VITALS — HEART RATE: 70 BPM | OXYGEN SATURATION: 98 % | DIASTOLIC BLOOD PRESSURE: 94 MMHG | SYSTOLIC BLOOD PRESSURE: 130 MMHG

## 2023-11-01 DIAGNOSIS — M17.12 PRIMARY OSTEOARTHRITIS OF LEFT KNEE: Primary | ICD-10-CM

## 2023-11-01 PROCEDURE — G8484 FLU IMMUNIZE NO ADMIN: HCPCS | Performed by: STUDENT IN AN ORGANIZED HEALTH CARE EDUCATION/TRAINING PROGRAM

## 2023-11-01 PROCEDURE — G8417 CALC BMI ABV UP PARAM F/U: HCPCS | Performed by: STUDENT IN AN ORGANIZED HEALTH CARE EDUCATION/TRAINING PROGRAM

## 2023-11-01 PROCEDURE — 1036F TOBACCO NON-USER: CPT | Performed by: STUDENT IN AN ORGANIZED HEALTH CARE EDUCATION/TRAINING PROGRAM

## 2023-11-01 PROCEDURE — 99213 OFFICE O/P EST LOW 20 MIN: CPT | Performed by: STUDENT IN AN ORGANIZED HEALTH CARE EDUCATION/TRAINING PROGRAM

## 2023-11-01 PROCEDURE — G8427 DOCREV CUR MEDS BY ELIG CLIN: HCPCS | Performed by: STUDENT IN AN ORGANIZED HEALTH CARE EDUCATION/TRAINING PROGRAM

## 2023-11-01 NOTE — PROGRESS NOTES
No improvement to pain in left knee. Stiffness at the end of a workday. She is 8 months post op left knee arthroscopy with partial medial meniscectomy. Last visit we tried to have gel injections approved. Denied for conservative measures. PT completed since then. We could resubmit gel approval request.     Pain persistent regardless of weightbearing. Worsens with long periods of standing. Pain rated 7/10 overall. No numbness or tingling in leg.
Allergen Reactions    Fentanyl Itching     Made nose itch    Propofol Itching     \"Made my face itch\"         Review of Systems   Constitutional:  Positive for activity change. Negative for fatigue and fever. HENT:  Negative for sneezing, sore throat and voice change. Respiratory:  Negative for cough, shortness of breath and wheezing. Cardiovascular:  Negative for leg swelling. Gastrointestinal:  Negative for nausea and vomiting. Musculoskeletal:  Positive for arthralgias and myalgias. Negative for back pain, gait problem, joint swelling, neck pain and neck stiffness. Skin:  Negative for color change, rash and wound. Neurological:  Positive for weakness. Negative for numbness. Psychiatric/Behavioral:  Negative for behavioral problems, confusion and self-injury. Examination:  General Exam:  Vitals: BP (!) 130/94 (Site: Right Wrist, Position: Sitting)   Pulse 70   LMP 07/09/2018 (Approximate)   SpO2 98%    Physical Exam  Constitutional:       General: She is not in acute distress. Appearance: Normal appearance. She is obese. HENT:      Head: Normocephalic and atraumatic. Eyes:      General:         Right eye: No discharge. Left eye: No discharge. Extraocular Movements: Extraocular movements intact. Cardiovascular:      Pulses: Normal pulses. Pulmonary:      Effort: Pulmonary effort is normal.      Breath sounds: Normal breath sounds. Musculoskeletal:         General: Tenderness present. No swelling, deformity or signs of injury. Cervical back: Normal range of motion. Right hip: Normal.      Left hip: Normal.      Right upper leg: Normal.      Left upper leg: Normal.      Left knee: Bony tenderness and crepitus present. No swelling, deformity, effusion, erythema, ecchymosis or lacerations. Normal range of motion. Tenderness present over the medial joint line and lateral joint line. No MCL or LCL tenderness.  No

## 2023-11-02 PROBLEM — M17.12 PRIMARY OSTEOARTHRITIS OF LEFT KNEE: Status: ACTIVE | Noted: 2023-11-02

## 2023-11-02 ASSESSMENT — ENCOUNTER SYMPTOMS
WHEEZING: 0
COLOR CHANGE: 0
BACK PAIN: 0
SHORTNESS OF BREATH: 0
COUGH: 0
NAUSEA: 0
VOMITING: 0
SORE THROAT: 0
VOICE CHANGE: 0

## 2023-11-28 ENCOUNTER — TELEPHONE (OUTPATIENT)
Dept: ORTHOPEDIC SURGERY | Age: 43
End: 2023-11-28

## 2023-11-28 NOTE — TELEPHONE ENCOUNTER
Ricardo Chavis: Patient called inquiring about gel injection approval status. Patient is also having an increase in pain from the knee into the lower leg. Denies SOB, chest pain, fevers/chills, and redness/tenderness in the lower leg. Patient educated to use RICE and OTC for pain and swelling. Dr. Paty Isaacs please advise if there are any other interventions.

## 2023-12-07 ENCOUNTER — NURSE ONLY (OUTPATIENT)
Dept: ORTHOPEDIC SURGERY | Age: 43
End: 2023-12-07
Payer: COMMERCIAL

## 2023-12-07 VITALS
OXYGEN SATURATION: 99 % | WEIGHT: 293 LBS | HEART RATE: 69 BPM | RESPIRATION RATE: 16 BRPM | HEIGHT: 67 IN | BODY MASS INDEX: 45.99 KG/M2

## 2023-12-07 DIAGNOSIS — M17.12 PRIMARY OSTEOARTHRITIS OF LEFT KNEE: Primary | ICD-10-CM

## 2023-12-07 PROCEDURE — 99999 PR OFFICE/OUTPT VISIT,PROCEDURE ONLY: CPT

## 2023-12-07 PROCEDURE — 20610 DRAIN/INJ JOINT/BURSA W/O US: CPT

## 2023-12-07 NOTE — PATIENT INSTRUCTIONS
Gelsyn 3  # 1  Rest both knees for 24-48 hours  Work on ROM and strengthening of knees and legs   May take NSAIDS or anti-inflammatories as needed  Weightbearing as tolerated  Follow up in one week for the 2nd injection     We are committed to providing you the best care possible. If you receive a survey after visiting one of our offices, please take time to share your experience concerning your physician office visit. These surveys are confidential and no health information about you is shared. We are eager to improve for you and we are counting on your feedback to help make that happen. Abdomen soft, non-tender, no guarding.

## 2023-12-14 ENCOUNTER — PROCEDURE VISIT (OUTPATIENT)
Dept: ORTHOPEDIC SURGERY | Age: 43
End: 2023-12-14
Payer: COMMERCIAL

## 2023-12-14 VITALS
OXYGEN SATURATION: 98 % | HEIGHT: 67 IN | WEIGHT: 293 LBS | BODY MASS INDEX: 45.99 KG/M2 | HEART RATE: 68 BPM | RESPIRATION RATE: 16 BRPM

## 2023-12-14 DIAGNOSIS — M17.12 PRIMARY OSTEOARTHRITIS OF LEFT KNEE: Primary | ICD-10-CM

## 2023-12-14 PROCEDURE — 20610 DRAIN/INJ JOINT/BURSA W/O US: CPT

## 2023-12-14 PROCEDURE — 99999 PR OFFICE/OUTPT VISIT,PROCEDURE ONLY: CPT

## 2023-12-14 NOTE — PATIENT INSTRUCTIONS
Libby 3 # 2  Rest both knees for 24-48 hours  Work on ROM and strengthening of knees and legs   May take NSAIDS or anti-inflammatories as needed  Weightbearing as tolerated  Follow up gloria one weeks    We are committed to providing you the best care possible. If you receive a survey after visiting one of our offices, please take time to share your experience concerning your physician office visit. These surveys are confidential and no health information about you is shared. We are eager to improve for you and we are counting on your feedback to help make that happen.

## 2024-01-08 ENCOUNTER — OFFICE VISIT (OUTPATIENT)
Dept: ORTHOPEDIC SURGERY | Age: 44
End: 2024-01-08
Payer: COMMERCIAL

## 2024-01-08 VITALS — HEART RATE: 80 BPM | HEIGHT: 67 IN | OXYGEN SATURATION: 99 % | BODY MASS INDEX: 45.99 KG/M2 | WEIGHT: 293 LBS

## 2024-01-08 DIAGNOSIS — S14.3XXA INJURY OF BRACHIAL PLEXUS, INITIAL ENCOUNTER: Primary | ICD-10-CM

## 2024-01-08 PROCEDURE — 1036F TOBACCO NON-USER: CPT | Performed by: STUDENT IN AN ORGANIZED HEALTH CARE EDUCATION/TRAINING PROGRAM

## 2024-01-08 PROCEDURE — G8484 FLU IMMUNIZE NO ADMIN: HCPCS | Performed by: STUDENT IN AN ORGANIZED HEALTH CARE EDUCATION/TRAINING PROGRAM

## 2024-01-08 PROCEDURE — 99213 OFFICE O/P EST LOW 20 MIN: CPT | Performed by: STUDENT IN AN ORGANIZED HEALTH CARE EDUCATION/TRAINING PROGRAM

## 2024-01-08 PROCEDURE — G8427 DOCREV CUR MEDS BY ELIG CLIN: HCPCS | Performed by: STUDENT IN AN ORGANIZED HEALTH CARE EDUCATION/TRAINING PROGRAM

## 2024-01-08 PROCEDURE — G8417 CALC BMI ABV UP PARAM F/U: HCPCS | Performed by: STUDENT IN AN ORGANIZED HEALTH CARE EDUCATION/TRAINING PROGRAM

## 2024-01-08 RX ORDER — ACETAMINOPHEN 500 MG
500 TABLET ORAL EVERY 6 HOURS PRN
COMMUNITY

## 2024-01-08 RX ORDER — METHYLPREDNISOLONE 4 MG/1
TABLET ORAL
Qty: 1 KIT | Refills: 0 | Status: SHIPPED | OUTPATIENT
Start: 2024-01-08 | End: 2024-01-14

## 2024-01-08 ASSESSMENT — ENCOUNTER SYMPTOMS
COUGH: 0
NAUSEA: 0
ABDOMINAL PAIN: 0
FACIAL SWELLING: 0
VOMITING: 0
STRIDOR: 0
PHOTOPHOBIA: 0
SHORTNESS OF BREATH: 0
COLOR CHANGE: 0
EYE REDNESS: 0
EYE PAIN: 0
BACK PAIN: 0
WHEEZING: 0

## 2024-01-08 NOTE — PATIENT INSTRUCTIONS
Medrol dose Nba prescription sent to pharmacy.  EMG ordered.  Follow up in our office for results of EMG

## 2024-01-08 NOTE — PROGRESS NOTES
Patient is a 43 y.o. year old female. Patient is in the office today with right shoulder pain. Patient states that she injured themselves by falling at work and hitting her shoulder on a counter. Patient states that the injury happened 10/18/23. Pain scale  7/10. Her pain is located along the posterior portion of her shoulder and moves down into her upper arm. She has been taking acetaminophen to help with the pain. She denies a prior hx of injury to her right shoulder. She also state that she is still having left knee pain since the gel injections.   Occupation: [employer]   Dominant Hand: Right      
shoulder   Right shoulder    SCAPTION   5/5    5/5     IR    5/5    5/5    ER    5/5    5/5    Deltoid   5/5    5/5    Biceps    5/5    5/5    Triceps   5/5    5/5          SIGNS:  RUE     Jobes/Empty Can: Neg    NEER: neg    PULLIAM: neg     O’KENNEY’S: neg      SPEED’S: neg   Yergason's: neg    DROP ARM: neg  BELLY PRESS: neg     X-Body ADD: neg       Crank: neg       Biceps stretch test: neg      EXTREMITY NEURO-VASCULAR EXAM:     Sensation grossly intact to light touch all dermatomal regions.     DTR 2+ Biceps, Triceps, BR and Negative Altosha’s sign    Grossly intact motor function at Elbow, Wrist and Hand    Distal pulses radial and ulnar 2+, brisk cap refill, symmetric.       NECK:  Painless FROM and spinous processes non-tender. Negative Spurling’s sign.        Diagnostic testing:  X-ray images were reviewed by myself and discussed with the patient:  3 views of the right shoulder in a skeletally mature patient demonstrates no acute osseous abnormalities.  No significant osteoarthritic changes seen at the glenohumeral joint and mild osteoarthritic changes seen at the AC joint.  Appropriate acromiohumeral distance.  No bony lesions.  No sign of any soft tissue calcification or avulsion type injury.  Appropriate acromiohumeral distance.  Type I acromion.      Office Procedures:  No orders of the defined types were placed in this encounter.      Assessment and Plan    A: Concern for brachial plexus injury    P:   I had a thorough conversation with the patient regarding her current his exam finding treatment course.  Explained in she does not appear to have any type of cervical radiculopathy but I am concerned for brachial plexus injury due to her location of pain which is radicular and tight nature and how she injured the arm falling with the arm outstretched.  At this time we will proceed with a EMG and provide her with a Medrol Dosepak for pain control.  She will follow-up after EMG is completed to discuss

## 2024-02-07 SDOH — HEALTH STABILITY: PHYSICAL HEALTH
ON AVERAGE, HOW MANY DAYS PER WEEK DO YOU ENGAGE IN MODERATE TO STRENUOUS EXERCISE (LIKE A BRISK WALK)?: PATIENT DECLINED

## 2024-02-08 ENCOUNTER — OFFICE VISIT (OUTPATIENT)
Dept: ORTHOPEDIC SURGERY | Age: 44
End: 2024-02-08
Payer: COMMERCIAL

## 2024-02-08 VITALS
HEART RATE: 67 BPM | OXYGEN SATURATION: 99 % | HEIGHT: 67 IN | RESPIRATION RATE: 12 BRPM | WEIGHT: 293 LBS | BODY MASS INDEX: 45.99 KG/M2

## 2024-02-08 DIAGNOSIS — E66.01 OBESITY, MORBID, BMI 50 OR HIGHER (HCC): ICD-10-CM

## 2024-02-08 DIAGNOSIS — M17.12 PRIMARY OSTEOARTHRITIS OF LEFT KNEE: Primary | ICD-10-CM

## 2024-02-08 PROCEDURE — 1036F TOBACCO NON-USER: CPT | Performed by: ORTHOPAEDIC SURGERY

## 2024-02-08 PROCEDURE — 99214 OFFICE O/P EST MOD 30 MIN: CPT | Performed by: ORTHOPAEDIC SURGERY

## 2024-02-08 PROCEDURE — G8417 CALC BMI ABV UP PARAM F/U: HCPCS | Performed by: ORTHOPAEDIC SURGERY

## 2024-02-08 PROCEDURE — G8484 FLU IMMUNIZE NO ADMIN: HCPCS | Performed by: ORTHOPAEDIC SURGERY

## 2024-02-08 PROCEDURE — G8427 DOCREV CUR MEDS BY ELIG CLIN: HCPCS | Performed by: ORTHOPAEDIC SURGERY

## 2024-02-08 ASSESSMENT — ENCOUNTER SYMPTOMS
VOMITING: 0
CHEST TIGHTNESS: 0
SHORTNESS OF BREATH: 0
EYE REDNESS: 0
COLOR CHANGE: 0
WHEEZING: 0
EYE PAIN: 0

## 2024-02-08 NOTE — PROGRESS NOTES
Patient is here today for her left knee pain. 2/20/23 Dr Crawford completed a knee scope. She has tried cortisone injections and gel injections completed in the past with little relief. Pain level 5/10 but pain increases when standing at work all day. She has tried HEP, weight loss, ice, heat, topical creams, and bracing with little relief.   
bony tenderness. Normal range of motion. No medial joint line or lateral joint line tenderness. No LCL laxity or MCL laxity. Normal alignment and normal patellar mobility.      Comments: Left Lower Extremity:    There is moderate to severe tenderness to palpation diffusely throughout the knee, most significant along the medial joint line.  There is a moderate knee joint effusion present and mild global swelling anteriorly.  Moderately restricted range of motion at the knee with approximately 5 degrees lack of full extension and knee flexion up to 120 degrees with pain at the extremes of motion.  There is mild crepitation during active range of motion.  There is mild varus knee alignment.  There is 5 out of 5 strength with knee flexion and extension.  There is no instability to varus or valgus stress testing and anterior and posterior drawer testing.  Sensation is intact to light touch throughout the lower extremity.  There is positive medial Nika's test with tenderness to palpation and pain along the medial joint line.  Skin is intact. Pulses are intact    No pain with active range of motion of the hip.  Strength and range of motion of the hip are intact.  No tenderness to palpation at the hip.       Skin:     General: Skin is warm and dry.   Neurological:      Mental Status: She is alert and oriented to person, place, and time.   Psychiatric:         Mood and Affect: Mood normal.         Behavior: Behavior normal.            Diagnostic testing:  X-ray images were reviewed by myself and discussed with the patient:  X-ray images of the left knee from 4/14/2023 demonstrate moderate to high-grade joint space narrowing with evidence of subchondral sclerosis and small osteophyte formation primarily along the medial compartment with mild varus alignment.  No evidence of fracture or acute abnormality.    Office Procedures:  Orders Placed This Encounter   Procedures    External Referral To Orthopedic Surgery

## 2024-02-13 ENCOUNTER — PROCEDURE VISIT (OUTPATIENT)
Dept: PHYSICAL MEDICINE AND REHAB | Age: 44
End: 2024-02-13
Payer: COMMERCIAL

## 2024-02-13 DIAGNOSIS — M79.601 PARESTHESIA AND PAIN OF BOTH UPPER EXTREMITIES: ICD-10-CM

## 2024-02-13 DIAGNOSIS — M79.602 PARESTHESIA AND PAIN OF BOTH UPPER EXTREMITIES: ICD-10-CM

## 2024-02-13 DIAGNOSIS — G56.01 CARPAL TUNNEL SYNDROME OF RIGHT WRIST: ICD-10-CM

## 2024-02-13 DIAGNOSIS — M54.12 C6 RADICULOPATHY: Primary | ICD-10-CM

## 2024-02-13 DIAGNOSIS — R20.2 PARESTHESIA AND PAIN OF BOTH UPPER EXTREMITIES: ICD-10-CM

## 2024-02-13 PROCEDURE — 95911 NRV CNDJ TEST 9-10 STUDIES: CPT | Performed by: PHYSICAL MEDICINE & REHABILITATION

## 2024-02-13 PROCEDURE — 95886 MUSC TEST DONE W/N TEST COMP: CPT | Performed by: PHYSICAL MEDICINE & REHABILITATION

## 2024-02-13 NOTE — PROGRESS NOTES
EMG REPORT     CHIEF COMPLAINT: Pain between her right shoulder and elbow.    HISTORY OF PRESENT ILLNESS: 44 y.o. right hand dominant female with abrupt onset of pain between her right elbow and shoulder along the medial aspect of the upper arm after a fall at work.  She described falling and catching her arm on something.  Now she has a sense of weakness of the right arm and pain when she tries to raise the arm at the shoulder.  Turning her head gives her a sharp pain near her right shoulder blade.  She denied having clumsiness in the hand but she can get tingling in the right index finger.  She rated the pain severity as 7/10.  Her symptoms disturb her sleep some but she does not routinely drop things from her .  She did not report any limb discoloration or rash.  She has no history of diabetes or any thyroid disorder.      PHYSICAL EXAMINATION: Alert.  About 70 degrees of active cervical spine rotation bilaterally.  Spurling's maneuver was uncomfortable and reproduced some upper arm pain and periscapular pain to the right.  Perception of touch was maintained but the patient had 4+/5 strength with right forearm pronation external rotation at the right shoulder and with elbow flexion.  There was no atrophy, tremor or clonus noted.  Tinel sign was negative.      NERVE CONDUCTION STUDIES:     MOTOR         LATENCY NORMAL AMPLITUDE DISTANCE COND. CHRISTIANO.   RIGHT  MEDIAN 4.5 < 4.2 msec 3.8 8 cm 52   LEFT  MEDIAN 4.2 < 4.2 msec 7.3 8 cm >50   RIGHT  ULNAR 3.0 < 4.2 msec 6.2 8 cm >50   LEFT  ULNAR 3.0 < 4.2 msec 7.2 8 cm >40      SENSORY  ORTHODROMIC        LATENCY NORMAL AMPLITUDE DISTANCE   RIGHT MEDIAN 2.8 <2.3 msec 18 10 cm   LEFT  MEDIAN 2.5 < 2.3 msec 14 10 cm   RIGHT  ULNAR 2.3 < 2.3 msec 8 10 cm   LEFT  ULNAR 2.5 < 2.3 msec 8 10 cm       Right dorsal ulnar sensory: dL 2.5 msec, amp 8 microV.        NEEDLE EMG:      RIGHT   LEFT     Insertional Activity Spontaneous  Activity Volutional  MUAP's Insertional

## 2024-02-21 ENCOUNTER — OFFICE VISIT (OUTPATIENT)
Dept: ORTHOPEDIC SURGERY | Age: 44
End: 2024-02-21
Payer: COMMERCIAL

## 2024-02-21 VITALS — HEART RATE: 62 BPM | BODY MASS INDEX: 51.56 KG/M2 | OXYGEN SATURATION: 98 % | WEIGHT: 293 LBS

## 2024-02-21 DIAGNOSIS — M54.12 CERVICAL RADICULOPATHY: Primary | ICD-10-CM

## 2024-02-21 PROCEDURE — 99213 OFFICE O/P EST LOW 20 MIN: CPT | Performed by: STUDENT IN AN ORGANIZED HEALTH CARE EDUCATION/TRAINING PROGRAM

## 2024-02-21 PROCEDURE — 1036F TOBACCO NON-USER: CPT | Performed by: STUDENT IN AN ORGANIZED HEALTH CARE EDUCATION/TRAINING PROGRAM

## 2024-02-21 PROCEDURE — G8427 DOCREV CUR MEDS BY ELIG CLIN: HCPCS | Performed by: STUDENT IN AN ORGANIZED HEALTH CARE EDUCATION/TRAINING PROGRAM

## 2024-02-21 PROCEDURE — G8417 CALC BMI ABV UP PARAM F/U: HCPCS | Performed by: STUDENT IN AN ORGANIZED HEALTH CARE EDUCATION/TRAINING PROGRAM

## 2024-02-21 PROCEDURE — G8484 FLU IMMUNIZE NO ADMIN: HCPCS | Performed by: STUDENT IN AN ORGANIZED HEALTH CARE EDUCATION/TRAINING PROGRAM

## 2024-02-21 RX ORDER — METHYLPREDNISOLONE 4 MG/1
TABLET ORAL
Qty: 1 KIT | Refills: 0 | Status: SHIPPED | OUTPATIENT
Start: 2024-02-21 | End: 2024-02-27

## 2024-02-21 ASSESSMENT — ENCOUNTER SYMPTOMS
STRIDOR: 0
WHEEZING: 0
ABDOMINAL PAIN: 0
EYE PAIN: 0
COUGH: 0
BACK PAIN: 0
VOMITING: 0
PHOTOPHOBIA: 0
NAUSEA: 0
EYE REDNESS: 0
COLOR CHANGE: 0
SHORTNESS OF BREATH: 0
FACIAL SWELLING: 0

## 2024-02-21 NOTE — PROGRESS NOTES
Pt comes in today for RT upper extremity EMG results. She rates her pain at 5/10 today. She was last seen in our office on 1/8/24. She denies any new falls or injuries.     IMPRESSION:                  1.  Abnormal EMG.  There were subtle electrical findings today suggesting a right C6 spinal nerve root injury (right C6 radiculopathy, electrically minimal).                 2.  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                 3.  No evidence of a concurrent cervical plexopathy, generalized neuropathy or primary muscle disease

## 2024-02-21 NOTE — PROGRESS NOTES
2/21/2024   Chief Complaint   Patient presents with    Follow-up     EMG results      Updated HPI: Patient returns today for reevaluation of her right upper extremity.  She states that she is not having numbness and tingling but continues to have radicular type pain down the arm that is intermittent.  She completed the EMG and is here for review as well.  No new injuries or complaints.    Previous HPI (1/8/2024):                             Joanie De La Garza is a 44 y.o. female Right handed patient referred by self for evaluation and treatment Right shoulder pain. This is evaluated as a personal injury. The pain is described as aching, sharp and stabbing.  Patient was initially injured 3 months ago when she fell at work hitting her shoulder on a counter and hyper abducting her arm.  She states that since then she has had radicular type pain in the posterior shoulder that moves down into the lower arm but nothing into the hand.  She does have some occasional numbness and tingling associated.  No additional issues including any issues with range of motion or strength.  Patient continues to work as a  at [employer].    The pain occurs every day and when active. Location of pain is posterior aspect of the upper arm. No history of dislocation. Symptoms are aggravated by ADL's, repetitive use, work at or above shoulder height, difficulty sleeping on affected side. Symptoms are diminished by rest, avoiding the painful activities.     Limited activities include: lifting, repetitive use, work at or above shoulder height, difficulty sleeping, difficulty with ADLs. No stiffness is reported.     Patient admits to numbness, tingling, altered sensation in the extremity.    Related history: negative for prior surgery, additional trauma, arthritis or disorders.     Is affecting ADLs.  Pain is 8/10 at it's worst.    Outside reports reviewed: none.     Patient's medications, allergies, past medical, surgical, social and

## 2024-03-04 ENCOUNTER — HOSPITAL ENCOUNTER (OUTPATIENT)
Dept: PHYSICAL THERAPY | Age: 44
Setting detail: THERAPIES SERIES
Discharge: HOME | End: 2024-03-04
Payer: COMMERCIAL

## 2024-03-04 PROCEDURE — 97161 PT EVAL LOW COMPLEX 20 MIN: CPT

## 2024-03-04 ASSESSMENT — PAIN DESCRIPTION - PAIN TYPE: TYPE: CHRONIC PAIN

## 2024-03-04 ASSESSMENT — PAIN DESCRIPTION - LOCATION: LOCATION: SHOULDER;NECK

## 2024-03-04 ASSESSMENT — PAIN DESCRIPTION - ORIENTATION: ORIENTATION: RIGHT

## 2024-03-04 NOTE — FLOWSHEET NOTE
Outpatient Physical Therapy  [place]           [x] Phone: [phone]   Fax: [phone]  [place]           [] Phone: [phone]   Fax: [phone]        Physical Therapy Daily Treatment Note  Date:  3/4/2024    Patient Name:  Joanie De La Garza    :  1980  MRN: [id number]  Restrictions/Precautions: No data recorded      Diagnosis:   Cervical radiculopathy [M54.12] Diagnosis: neck and shoulder pain  Date of Injury/Surgery:   Treatment Diagnosis:  cervical nerve root radiculopathy R  Insurance/Certification information: [facility] ( APPROVED 64 UNITS OF EACH [id number]  APPROVED 16 UNITS OF [id number]     24-24  Referring Physician:  Jhonny Crawford DO     PCP: No primary care provider on file.  Next Doctor Visit:    Plan of care signed (Y/N):  no  Outcome Measure:  NDI 17pts  Visit# / total visits: 1  / 10-15  Pain level: 3-5 /10   Goals:     Patient goals: less R shoudlernad neck pain and tingling with mopping and becki  Short term goals  Time Frame for Short term goals: 4 weeks  1.  ind with HEP for cervical and shoulder paoture  2. demo good posture in the clinic  3. reports 50% less R upper arm pain and tingling with becki up to 2 hours  4. 50% less TTP inthe R suboccipitals , PVM  and levator area     Long Term Goals  Time Frame for Long Term Goals: 6-8 weeks  1.   repots 75% less shoulder symptoms at work mopping and at home becki  2. cervcal  ROM  ext  50 with no increased  R shoulder tingling  3. NDI improved to 10 or less points  4. R shoudler 4/5 elevation and ER  MMT         Summary of Evaluation:  Assessment: Pt appears with symptoms of R shoulder and neck pain and tingling , she has a MRI indicting C6  R nerve root impingement and her physician has requested traction. She does present with positive compression and distraction tests and demonstrates hypersensitivity in much of the cervical and scap musculature.  Pt would benefit from skilled therapy

## 2024-03-04 NOTE — PROGRESS NOTES
Outpatient Physical Therapy           [place]           [x] Phone: [phone]   Fax: [phone]  [place]           [] Phone: [phone]   Fax: [phone]     To: Jhonny Crawford DO     From: Gian Torres, PT, DPT     Patient: Joanie De La Garza       : 1980  Diagnosis: Cervical radiculopathy [M54.12] Diagnosis: neck and shoulder pain  Treatment Diagnosis: cervical nerve root radiculopathy R  Date: 3/4/2024    Physical Therapy Certification/Re-Certification Form  Dear Jhonny Arnold DO  The following patient has been evaluated for physical therapy services and for therapy to continue, insurance requires physician review of the treatment plan initially and every 90 days. Please review the attached evaluation and/or summary of the patient's plan of care, and verify that you agree therapy should continue by signing the attached document and sending it back to our office.    Assessment:    Assessment: Pt appears with symptoms of R shoulder and neck pain and tingling , she has a MRI indicting C6  R nerve root impingement and her physician has requested traction. She does present with positive compression and distraction tests and demonstrates hypersensitivity in much of the cervical and scap musculature.  Pt would benefit from skilled therapy interventions as needed to address listed impairments, progress toward goal completion and improve ADL/IADL status.  PT also warranted to reduce risk for further injury or decline.    Plan of Care/Treatment to date:  [x] Therapeutic Exercise  [x] Modalities: prn  [x] Therapeutic Activity     [] Ultrasound  [] Electrical Stimulation  [] Gait Training      [] Cervical Traction [] Lumbar Traction  [x] Neuromuscular Re-education    [] Cold/hotpack [] Iontophoresis   [x] Instruction in HEP      [] Vasopneumatic    [] Dry Needling  [x] Manual Therapy               [] Aquatic Therapy       Other:          Frequency/Duration:  # Days per week: [] 1 
4 weeks Goal Status   1.  ind with HEP for cervical and shoulder paoture     2. demo good posture in the clinic     3. reports 50% less R upper arm pain and tingling with becki up to 2 hours     4. 50% less TTP inthe R suboccipitals , PVM  and levator area                                             Long Term Goals Completed by 6-8 weeks Goal Status   1.   repots 75% less shoulder symptoms at work mopping and at home becki     2. cervcal  ROM  ext  50 with no increased  R shoulder tingling     3. NDI improved to 10 or less points     4. R shoudler 4/5 elevation and ER  MMT                                              TREATMENT PLAN   PT Equipment Recommendations  Equipment Needed: No   Requires PT Follow-Up: Yes  Treatment Initiated : started on HEP for posture and flexibility  Specific Instructions for Next Treatment: trial of traction manual then mechanical, postureal TE, manual    Pt. actively involved in establishing Plan of Care and Goals: Yes  Patient/ Caregiver education and instruction: Goals, PT Role, Plan of Care, Evaluative findings, Insurance, Home Exercise Program    traction        Treatment may include any combination of the following: Current Treatment Recommendations: Strengthening, Manual, Neuromuscular re-education, Home exercise program     Frequency / Duration:  Patient to be seen 2 per week for 4-8 weks weeks      Eval Complexity: Overall Evaluation : Low  Decision Making: Low Complexity  History: Personal Factors and/or Comorbidities Impacting POC: Low  Examination of body system(s) including body structures and functions, activity limitations, and/or participation restrictions: Low  Clinical Presentation: Low    PT Treatment Completed:  Started on HEP for posture and flexibility    Therapy Time  Individual Time In:       945  Individual Time Out:   1025  Minutes:   1 eval ( 40)        Therapist Signature: Gian Torres PT    Date: 3/4/2024     I certify that the above Therapy Services

## 2024-03-07 ENCOUNTER — HOSPITAL ENCOUNTER (OUTPATIENT)
Dept: PHYSICAL THERAPY | Age: 44
Setting detail: THERAPIES SERIES
Discharge: HOME | End: 2024-03-07
Payer: COMMERCIAL

## 2024-03-07 PROCEDURE — 97140 MANUAL THERAPY 1/> REGIONS: CPT

## 2024-03-07 PROCEDURE — 97110 THERAPEUTIC EXERCISES: CPT

## 2024-03-07 NOTE — FLOWSHEET NOTE
Outpatient Physical Therapy  [facility]           [x] Phone: [phone]   Fax: [phone]  [place]           [] Phone: [phone]   Fax: [phone]        Physical Therapy Daily Treatment Note  Date:  3/7/2024  Patient Name:  Joanie De La Garza                   :  1980                     MRN: [id number]  Restrictions/Precautions: No data recorded   Diagnosis:   Cervical radiculopathy [M54.12] Diagnosis: neck and shoulder pain  Date of Injury/Surgery:   Treatment Diagnosis:  cervical nerve root radiculopathy R  Insurance/Certification information: [facility] ( APPROVED 64 UNITS OF EACH [id number]  APPROVED 16 UNITS OF 94699     24-24  Referring Physician:  Jhonny Crawford DO     PCP: No primary care provider on file.  Next Doctor Visit:    Plan of care signed (Y/N):  no  Outcome Measure:  NDI 17pts  Visit# / total visits: 2 / 10-15  Pain level:      3-5 /10   Goals:     Patient goals: less R shoudlernad neck pain and tingling with mopping and becki  Short term goals  Time Frame for Short term goals: 4 weeks  1.  ind with HEP for cervical and shoulder paoture  2. demo good posture in the clinic  3. reports 50% less R upper arm pain and tingling with becki up to 2 hours  4. 50% less TTP inthe R suboccipitals , PVM  and levator area  Long Term Goals  Time Frame for Long Term Goals: 6-8 weeks  1.   repots 75% less shoulder symptoms at work mopping and at home becki  2. cervcal  ROM  ext  50 with no increased  R shoulder tingling  3. NDI improved to 10 or less points  4. R shoudler 4/5 elevation and ER  MMT        Summary of Evaluation:  Assessment: Pt appears with symptoms of R shoulder and neck pain and tingling , she has a MRI indicting C6  R nerve root impingement and her physician has requested traction. She does present with positive compression and distraction tests and demonstrates hypersensitivity in much of the cervical and scap musculature.  Pt would benefit

## 2024-03-11 ENCOUNTER — HOSPITAL ENCOUNTER (OUTPATIENT)
Dept: PHYSICAL THERAPY | Age: 44
Setting detail: THERAPIES SERIES
Discharge: HOME | End: 2024-03-11
Payer: COMMERCIAL

## 2024-03-11 PROCEDURE — 97140 MANUAL THERAPY 1/> REGIONS: CPT

## 2024-03-11 PROCEDURE — 97110 THERAPEUTIC EXERCISES: CPT

## 2024-03-11 NOTE — FLOWSHEET NOTE
Outpatient Physical Therapy  [facility]           [x] Phone: [phone]   Fax: [phone]  [place]           [] Phone: [phone]   Fax: [phone]        Physical Therapy Daily Treatment Note  Date:  3/11/2024  Patient Name:  Joanie De La Garza                   :  1980                     MRN: [id number]  Restrictions/Precautions: No data recorded   Diagnosis:   Cervical radiculopathy [M54.12] Diagnosis: neck and shoulder pain  Date of Injury/Surgery:   Treatment Diagnosis:  cervical nerve root radiculopathy R  Insurance/Certification information: [facility] ( APPROVED 64 UNITS OF EACH [id number]  APPROVED 16 UNITS OF 23469     24-24  Referring Physician:  Jhonny Crawford DO     PCP: No primary care provider on file.  Next Doctor Visit:    Plan of care signed (Y/N):  no  Outcome Measure:  NDI 17pts  Visit# / total visits: 3 / 10-15  Pain level:       3-5 /10   Goals:     Patient goals: less R shoudlernad neck pain and tingling with mopping and becki  Short term goals  Time Frame for Short term goals: 4 weeks  1.  ind with HEP for cervical and shoulder paoture  2. demo good posture in the clinic  3. reports 50% less R upper arm pain and tingling with becki up to 2 hours  4. 50% less TTP inthe R suboccipitals , PVM  and levator area  Long Term Goals  Time Frame for Long Term Goals: 6-8 weeks  1.   repots 75% less shoulder symptoms at work mopping and at home becki  2. cervcal  ROM  ext  50 with no increased  R shoulder tingling  3. NDI improved to 10 or less points  4. R shoudler 4/5 elevation and ER  MMT        Summary of Evaluation:  Assessment: Pt appears with symptoms of R shoulder and neck pain and tingling , she has a MRI indicting C6  R nerve root impingement and her physician has requested traction. She does present with positive compression and distraction tests and demonstrates hypersensitivity in much of the cervical and scap musculature.  Pt would

## 2024-03-18 ENCOUNTER — HOSPITAL ENCOUNTER (OUTPATIENT)
Dept: PHYSICAL THERAPY | Age: 44
Setting detail: THERAPIES SERIES
Discharge: HOME | End: 2024-03-18
Payer: COMMERCIAL

## 2024-03-18 PROCEDURE — 97110 THERAPEUTIC EXERCISES: CPT

## 2024-03-18 PROCEDURE — 97140 MANUAL THERAPY 1/> REGIONS: CPT

## 2024-03-18 NOTE — FLOWSHEET NOTE
from skilled therapy interventions as needed to address listed impairments, progress toward goal completion and improve ADL/IADL status.  PT also warranted to reduce risk for further injury or decline.      Subjective:  Pt arrives to tx session reporting 4/10 pain in Cervical. States that she has no interest in trying mechanical traction      Any changes in Ambulatory Summary Sheet?  None      Objective:       ( 3/18/24 continues with poor rounded posture despite education. )  ( 3/11/24  continued poor posture, fwd head and rounded shoulders, continued cervical tension and tenderness)     Exercises: (No more than 4 columns)  cervical radiculopathy R C6   Exercise/Equipment 1# 3/4/24 2# 3/7/24 3# 3/11/24 4# 3/18/24             WARM UP          UBE  2/2 4 min 2/2          TABLE       UT stretch 2*30 sec Manual R Man  B Manual    Levator stretch  Manual R Man B Manual                            STANDING       Scap pinch *10   *10 x10   Pec stretch 2*30 sec      Chin tuck *10  At wall with towel *10 At wall with towel *10   rows  2x10 RTB 2*10 red 2x10 RTB   ext  2x10 RTB 2*10 red 2x10 RTB   Taffy pull     2x10 RTB   Hor ABD    2x10 RTB               PROPRIOCEPTION                                          MODALITIES                         Other Therapeutic Activities/Education:  reviewed posture and HEP.       Home Exercise Program:      Access Code: [email or web address]  URL: [email or web address]  Date: 03/04/2024  Prepared by: Gian Torres    Exercises  - Standing Cervical Retraction  - 3 x daily - 7 x weekly - 1 sets - 10 reps  - Seated Scapular Retraction  - 3 x daily - 7 x weekly - 1 sets - 10 reps  - Doorway Pec Stretch at 90 Degrees Abduction  - 2 x daily - 7 x weekly - 1 sets - 3 reps - 20 sec hold  - Gentle Levator Scapulae Stretch  - 1 x daily - 7 x weekly - 1 sets - 3 reps - 20 sec hold      Manual Treatments:  cervical manual distraction / traction, STM,and manual stretching and mobes to lower cervical

## 2024-03-22 ENCOUNTER — HOSPITAL ENCOUNTER (OUTPATIENT)
Dept: PHYSICAL THERAPY | Age: 44
Setting detail: THERAPIES SERIES
Discharge: HOME | End: 2024-03-22
Payer: COMMERCIAL

## 2024-03-22 PROCEDURE — 97110 THERAPEUTIC EXERCISES: CPT

## 2024-03-22 PROCEDURE — 97140 MANUAL THERAPY 1/> REGIONS: CPT

## 2024-03-22 NOTE — FLOWSHEET NOTE
Outpatient Physical Therapy  [place]           [x] Phone: [phone]   Fax: [phone]  [place]           [] Phone: [phone]   Fax: [phone]        Physical Therapy Daily Treatment Note  Date:  3/22/2024  Patient Name:  Joaine De La Garza                   :  1980                     MRN: [id number]  Restrictions/Precautions: No data recorded   Diagnosis:   Cervical radiculopathy [M54.12] Diagnosis: neck and shoulder pain  Date of Injury/Surgery:   Treatment Diagnosis:  cervical nerve root radiculopathy R  Insurance/Certification information: [facility] ( APPROVED 64 UNITS OF EACH [id number]  APPROVED 16 UNITS OF 66464     24-24  Referring Physician:  Jhonny Crawford DO     PCP: No primary care provider on file.  Next Doctor Visit:    Plan of care signed (Y/N):  no  Outcome Measure:  NDI 17pts  Visit# / total visits: 5 / 10-15  Pain level:     4 /10   Goals:     Patient goals: less R shoudlernad neck pain and tingling with mopping and becki  Short term goals  Time Frame for Short term goals: 4 weeks  1.  ind with HEP for cervical and shoulder paoture  2. demo good posture in the clinic  3. reports 50% less R upper arm pain and tingling with becki up to 2 hours  4. 50% less TTP inthe R suboccipitals , PVM  and levator area  Long Term Goals  Time Frame for Long Term Goals: 6-8 weeks  1.   repots 75% less shoulder symptoms at work mopping and at home becki  2. cervcal  ROM  ext  50 with no increased  R shoulder tingling  3. NDI improved to 10 or less points  4. R shoudler 4/5 elevation and ER  MMT        Summary of Evaluation:  Assessment: Pt appears with symptoms of R shoulder and neck pain and tingling , she has a MRI indicting C6  R nerve root impingement and her physician has requested traction. She does present with positive compression and distraction tests and demonstrates hypersensitivity in much of the cervical and scap musculature.  Pt would benefit

## 2024-03-25 ENCOUNTER — HOSPITAL ENCOUNTER (OUTPATIENT)
Dept: PHYSICAL THERAPY | Age: 44
Setting detail: THERAPIES SERIES
Discharge: HOME | End: 2024-03-25
Payer: COMMERCIAL

## 2024-03-25 PROCEDURE — 97140 MANUAL THERAPY 1/> REGIONS: CPT

## 2024-03-25 NOTE — FLOWSHEET NOTE
Instruction in HEP      [] Vasopneumatic   [] Dry Needling    [x] Manual Therapy               [] Aquatic Therapy            Electronically signed by:  NIHARIKA Vazquez    3/25/2024, 11:19 AM

## 2024-04-01 ENCOUNTER — HOSPITAL ENCOUNTER (OUTPATIENT)
Dept: PHYSICAL THERAPY | Age: 44
Setting detail: THERAPIES SERIES
Discharge: HOME | End: 2024-04-01

## 2024-04-01 NOTE — FLOWSHEET NOTE
much of the cervical and scap musculature.  Pt would benefit from skilled therapy interventions as needed to address listed impairments, progress toward goal completion and improve ADL/IADL status.  PT also warranted to reduce risk for further injury or decline.      Subjective: Pt states she is doing better in the cervical are and she is ready to d/c as she is ind with the HEP, she is able to play video games  for  3+ hours before she gets the tingling in the base of the neck ,she is not having any symptoms radiating to the arm or hand any longer.she is much more limited by her knee and she I having surgery from the 4/11/24. She states she is ready to d/c today. She reports feeling 50% better      Any changes in Ambulatory Summary Sheet?  None      Objective:   ( 4/1/24  ind with cervical HEP,  continued rounded shoulder and fwd cervical posture, R UE radiating symptoms 50% less after prolonged becki,   50% less TTP in the suboccipitals, UT and levator,  on the R,  cervical  ROM Ext 30 with increased tingling in the R UE  , MMT R shoulder  flex 4/5 ER 4/5 ,NDI 8 pts)    ( 3/25/24 continued poor posture, rounded shoulders with fwd head, heavily focused on knee pain this date. )  ( 3/18/24 continues with poor rounded posture despite education. )  ( 3/11/24  continued poor posture, fwd head and rounded shoulders, continued cervical tension and tenderness)     Exercises: (No more than 4 columns)  cervical radiculopathy R C6   Exercise/Equipment 4# 3/18/24  3/22/24  #5 3/25/24 #6 7# 4/1/24             WARM UP          UBE 2/2 2'/2' 2/2 4 min          TABLE       UT stretch Manual   Manual man   Levator stretch Manual   Manual  man          Manual therapy  See below See below  below             STANDING       Scap pinch x10      Pec stretch       Chin tuck At wall with towel *10   *10   rows 2x10 RTB   Gtb 2*10   ext 2x10 RTB      Taffy pull  2x10 RTB   Gtb 2*10   Hor ABD 2x10 RTB   2*10 red               PROPRIOCEPTION

## 2024-04-01 NOTE — DISCHARGE SUMMARY
Outpatient Physical Therapy           [place]           [x] Phone: [phone]   Fax: [phone]  [place]           [] Phone: [phone]   Fax: [phone]      To: Jhonny Crawford DO     From: Gian Torres, PT, DPT     Patient: Joanie De La Garza                    : 1980  Diagnosis:  Cervical radiculopathy [M54.12]        Treatment Diagnosis:     cervical nerve root radiculopathy R   Date: 2024  []  Progress Note                [x]  Discharge Note    Evaluation Date:   3/4/24  Total Visits to date:   7 Cancels/No-shows to date:  0    Subjective:  Pt states she is doing better in the cervical are and she is ready to d/c as she is ind with the HEP, she is able to play video games for 3+ hours before she gets the tingling in the base of the neck ,she is not having any symptoms radiating to the arm or hand any longer.she is much more limited by her knee and she I having surgery from the 24. She states she is ready to d/c today. She reports feeling 50% better       Plan of Care/Treatment to date:  [x] Therapeutic Exercise    [x] Modalities: pt deferred   [x] Therapeutic Activity     [] Ultrasound  [] Electrical Stimulation  [] Gait Training      [x] Cervical Traction   [] Lumbar Traction  [x] Neuromuscular Re-education  [] Cold/hotpack [] Iontophoresis  [x] Instruction in HEP      Other:  [x] Manual Therapy       []  Vasopneumatic  [] Aquatic Therapy       []   Dry Needle Therapy                      Objective/Significant Findings At Last Visit/Comments:   ind with cervical HEP,  continued rounded shoulder and fwd cervical posture, R UE radiating symptoms 50% less after prolonged becki,   50% less TTP in the suboccipitals, UT and levator,  on the R,  cervical  ROM Ext 30 with increased tingling in the R UE  , MMT R shoulder  flex 4/5 ER 4/5 ,NDI 8 pts       Assessment:   Pt tolerates tx session well without adverse reactions or complications.she remains with rounded shoulder and

## 2024-04-22 ENCOUNTER — OFFICE VISIT (OUTPATIENT)
Dept: ORTHOPEDIC SURGERY | Age: 44
End: 2024-04-22
Payer: COMMERCIAL

## 2024-04-22 ENCOUNTER — TELEPHONE (OUTPATIENT)
Dept: ORTHOPEDIC SURGERY | Age: 44
End: 2024-04-22

## 2024-04-22 VITALS — RESPIRATION RATE: 14 BRPM | OXYGEN SATURATION: 97 % | HEART RATE: 71 BPM

## 2024-04-22 DIAGNOSIS — M54.12 CERVICAL RADICULOPATHY: Primary | ICD-10-CM

## 2024-04-22 PROCEDURE — 1036F TOBACCO NON-USER: CPT | Performed by: STUDENT IN AN ORGANIZED HEALTH CARE EDUCATION/TRAINING PROGRAM

## 2024-04-22 PROCEDURE — G8417 CALC BMI ABV UP PARAM F/U: HCPCS | Performed by: STUDENT IN AN ORGANIZED HEALTH CARE EDUCATION/TRAINING PROGRAM

## 2024-04-22 PROCEDURE — G8427 DOCREV CUR MEDS BY ELIG CLIN: HCPCS | Performed by: STUDENT IN AN ORGANIZED HEALTH CARE EDUCATION/TRAINING PROGRAM

## 2024-04-22 PROCEDURE — 99213 OFFICE O/P EST LOW 20 MIN: CPT | Performed by: STUDENT IN AN ORGANIZED HEALTH CARE EDUCATION/TRAINING PROGRAM

## 2024-04-22 ASSESSMENT — ENCOUNTER SYMPTOMS
FACIAL SWELLING: 0
NAUSEA: 0
PHOTOPHOBIA: 0
EYE PAIN: 0
COUGH: 0
BACK PAIN: 0
VOMITING: 0
EYE REDNESS: 0
WHEEZING: 0
ABDOMINAL PAIN: 0
SHORTNESS OF BREATH: 0
COLOR CHANGE: 0
STRIDOR: 0

## 2024-04-22 NOTE — PROGRESS NOTES
4/22/2024   No chief complaint on file.       Updated HPI: Patient returns for reevaluation of her right upper extremity.  She states that her numbness and tingling have resolved and she did really well with therapy.  Her issues continued to be at the knee.  She was seen at [facility] and discussed partial knee replacement with one of the physicians there but she was told that her BMI needed to be at 40 or less which would be approximately 75 pound weight loss.  She continues to have no desire to work with any type of specialist in regards to weight loss to meet her goal.  She states that she has too much pain to do any type of exercise.  No new complaints or injuries.    Previous HPI (2/21/2024): Patient returns today for reevaluation of her right upper extremity.  She states that she is not having numbness and tingling but continues to have radicular type pain down the arm that is intermittent.  She completed the EMG and is here for review as well.  No new injuries or complaints.    Previous HPI (1/8/2024):                             Joanie De La Garza is a 44 y.o. female Right handed patient referred by self for evaluation and treatment Right shoulder pain. This is evaluated as a personal injury. The pain is described as aching, sharp and stabbing.  Patient was initially injured 3 months ago when she fell at work hitting her shoulder on a counter and hyper abducting her arm.  She states that since then she has had radicular type pain in the posterior shoulder that moves down into the lower arm but nothing into the hand.  She does have some occasional numbness and tingling associated.  No additional issues including any issues with range of motion or strength.  Patient continues to work as a  at [employer].    The pain occurs every day and when active. Location of pain is posterior aspect of the upper arm. No history of dislocation. Symptoms are aggravated by ADL's, repetitive use, work at or

## 2024-04-22 NOTE — PATIENT INSTRUCTIONS
Continue to work on weight loss.  Weight bearing as tolerated.  Wear brace to assist with ambulation.  Iovera discussed.  Follow up in office for Iovera once approved.

## 2024-05-09 ENCOUNTER — HOSPITAL ENCOUNTER (OUTPATIENT)
Dept: NON INVASIVE DIAGNOSTICS | Age: 44
Discharge: HOME | End: 2024-05-11
Payer: COMMERCIAL

## 2024-05-09 VITALS
BODY MASS INDEX: 45.99 KG/M2 | SYSTOLIC BLOOD PRESSURE: 130 MMHG | HEIGHT: 67 IN | WEIGHT: 293 LBS | DIASTOLIC BLOOD PRESSURE: 94 MMHG

## 2024-05-09 DIAGNOSIS — Z01.818 PRE-OPERATIVE CLEARANCE: ICD-10-CM

## 2024-05-09 DIAGNOSIS — R01.1 MURMUR: ICD-10-CM

## 2024-05-09 PROCEDURE — 93306 TTE W/DOPPLER COMPLETE: CPT

## 2024-05-10 LAB
ECHO AO ROOT INDEX: 1.32 CM/M2
ECHO AV AREA PEAK VELOCITY: 2.2 CM2
ECHO AV AREA VTI: 2.5 CM2
ECHO AV AREA/BSA PEAK VELOCITY: 0.9 CM2/M2
ECHO AV AREA/BSA VTI: 1 CM2/M2
ECHO AV MEAN GRADIENT: 9 MMHG
ECHO AV MEAN VELOCITY: 1.5 M/S
ECHO AV PEAK GRADIENT: 17 MMHG
ECHO AV PEAK VELOCITY: 2.1 M/S
ECHO AV VELOCITY RATIO: 0.57
ECHO AV VTI: 42.7 CM
ECHO BSA: 2.66 M2
ECHO EST RA PRESSURE: 3 MMHG
ECHO IVC PROX: 1.7 CM
ECHO LA AREA 4C: 19.9 CM2
ECHO LA DIAMETER INDEX: 1.12 CM/M2
ECHO LA DIAMETER: 2.8 CM
ECHO LA MAJOR AXIS: 6.5 CM
ECHO LA TO AORTIC ROOT RATIO: 0.85
ECHO LA VOL MOD A4C: 49 ML (ref 22–52)
ECHO LA VOLUME INDEX MOD A4C: 20 ML/M2 (ref 16–34)
ECHO LV E' LATERAL VELOCITY: 10 CM/S
ECHO LV E' SEPTAL VELOCITY: 9 CM/S
ECHO LV EDV A4C: 146 ML
ECHO LV EDV INDEX A4C: 58 ML/M2
ECHO LV EJECTION FRACTION A4C: 66 %
ECHO LV ESV A4C: 50 ML
ECHO LV ESV INDEX A4C: 20 ML/M2
ECHO LV FRACTIONAL SHORTENING: 28 % (ref 28–44)
ECHO LV INTERNAL DIMENSION DIASTOLE INDEX: 2 CM/M2
ECHO LV INTERNAL DIMENSION DIASTOLIC: 5 CM (ref 3.9–5.3)
ECHO LV INTERNAL DIMENSION SYSTOLIC INDEX: 1.44 CM/M2
ECHO LV INTERNAL DIMENSION SYSTOLIC: 3.6 CM
ECHO LV IVSD: 0.8 CM (ref 0.6–0.9)
ECHO LV MASS 2D: 169.9 G (ref 67–162)
ECHO LV MASS INDEX 2D: 68 G/M2 (ref 43–95)
ECHO LV POSTERIOR WALL DIASTOLIC: 1.1 CM (ref 0.6–0.9)
ECHO LV RELATIVE WALL THICKNESS RATIO: 0.44
ECHO LVOT AREA: 3.8 CM2
ECHO LVOT AV VTI INDEX: 0.65
ECHO LVOT DIAM: 2.2 CM
ECHO LVOT MEAN GRADIENT: 3 MMHG
ECHO LVOT PEAK GRADIENT: 6 MMHG
ECHO LVOT PEAK VELOCITY: 1.2 M/S
ECHO LVOT STROKE VOLUME INDEX: 42.1 ML/M2
ECHO LVOT SV: 105.2 ML
ECHO LVOT VTI: 27.7 CM
ECHO MV A VELOCITY: 0.97 M/S
ECHO MV E VELOCITY: 1.1 M/S
ECHO MV E/A RATIO: 1.13
ECHO MV E/E' LATERAL: 11
ECHO MV E/E' RATIO (AVERAGED): 11.61
ECHO MV E/E' SEPTAL: 12.22
ECHO RIGHT VENTRICULAR SYSTOLIC PRESSURE (RVSP): 31 MMHG
ECHO RV MID DIMENSION: 3.1 CM
ECHO TV REGURGITANT MAX VELOCITY: 2.66 M/S
ECHO TV REGURGITANT PEAK GRADIENT: 28 MMHG

## 2025-06-16 SDOH — HEALTH STABILITY: PHYSICAL HEALTH: ON AVERAGE, HOW MANY MINUTES DO YOU ENGAGE IN EXERCISE AT THIS LEVEL?: 0 MIN

## 2025-06-19 ENCOUNTER — OFFICE VISIT (OUTPATIENT)
Dept: FAMILY MEDICINE CLINIC | Age: 45
End: 2025-06-19

## 2025-06-19 VITALS
BODY MASS INDEX: 45.99 KG/M2 | SYSTOLIC BLOOD PRESSURE: 138 MMHG | DIASTOLIC BLOOD PRESSURE: 82 MMHG | WEIGHT: 293 LBS | HEIGHT: 67 IN

## 2025-06-19 DIAGNOSIS — F41.1 GENERALIZED ANXIETY DISORDER: ICD-10-CM

## 2025-06-19 DIAGNOSIS — I10 PRIMARY HYPERTENSION: Primary | ICD-10-CM

## 2025-06-19 DIAGNOSIS — F33.1 MODERATE EPISODE OF RECURRENT MAJOR DEPRESSIVE DISORDER (HCC): ICD-10-CM

## 2025-06-19 DIAGNOSIS — G89.29 CHRONIC PAIN OF LEFT KNEE: ICD-10-CM

## 2025-06-19 DIAGNOSIS — M25.562 CHRONIC PAIN OF LEFT KNEE: ICD-10-CM

## 2025-06-19 DIAGNOSIS — E01.0 THYROMEGALY: ICD-10-CM

## 2025-06-19 DIAGNOSIS — E66.01 OBESITY, MORBID, BMI 50 OR HIGHER (HCC): ICD-10-CM

## 2025-06-19 DIAGNOSIS — N95.1 PERIMENOPAUSAL SYMPTOMS: ICD-10-CM

## 2025-06-19 DIAGNOSIS — R22.1 MASS OF LEFT SIDE OF NECK: ICD-10-CM

## 2025-06-19 RX ORDER — BLOOD PRESSURE TEST KIT
1 KIT MISCELLANEOUS DAILY
Qty: 1 KIT | Refills: 0 | Status: SHIPPED | OUTPATIENT
Start: 2025-06-19

## 2025-06-19 RX ORDER — PHENTERMINE HYDROCHLORIDE 37.5 MG/1
37.5 TABLET ORAL
Qty: 30 TABLET | Refills: 0 | Status: SHIPPED | OUTPATIENT
Start: 2025-06-19 | End: 2025-07-19

## 2025-06-19 RX ORDER — LOSARTAN POTASSIUM 50 MG/1
50 TABLET ORAL DAILY
Qty: 30 TABLET | Refills: 1 | Status: SHIPPED | OUTPATIENT
Start: 2025-06-19

## 2025-06-19 RX ORDER — PAROXETINE 10 MG/5ML
20 SUSPENSION ORAL EVERY MORNING
COMMUNITY
End: 2025-06-22

## 2025-06-19 RX ORDER — BUSPIRONE HYDROCHLORIDE 10 MG/1
10 TABLET ORAL 2 TIMES DAILY
Qty: 60 TABLET | Refills: 0 | Status: SHIPPED | OUTPATIENT
Start: 2025-06-19 | End: 2025-07-19

## 2025-06-19 SDOH — ECONOMIC STABILITY: FOOD INSECURITY: WITHIN THE PAST 12 MONTHS, THE FOOD YOU BOUGHT JUST DIDN'T LAST AND YOU DIDN'T HAVE MONEY TO GET MORE.: NEVER TRUE

## 2025-06-19 SDOH — ECONOMIC STABILITY: FOOD INSECURITY: WITHIN THE PAST 12 MONTHS, YOU WORRIED THAT YOUR FOOD WOULD RUN OUT BEFORE YOU GOT MONEY TO BUY MORE.: NEVER TRUE

## 2025-06-19 ASSESSMENT — PATIENT HEALTH QUESTIONNAIRE - PHQ9
2. FEELING DOWN, DEPRESSED OR HOPELESS: MORE THAN HALF THE DAYS
3. TROUBLE FALLING OR STAYING ASLEEP: SEVERAL DAYS
4. FEELING TIRED OR HAVING LITTLE ENERGY: MORE THAN HALF THE DAYS
10. IF YOU CHECKED OFF ANY PROBLEMS, HOW DIFFICULT HAVE THESE PROBLEMS MADE IT FOR YOU TO DO YOUR WORK, TAKE CARE OF THINGS AT HOME, OR GET ALONG WITH OTHER PEOPLE: NOT DIFFICULT AT ALL
SUM OF ALL RESPONSES TO PHQ QUESTIONS 1-9: 9
7. TROUBLE CONCENTRATING ON THINGS, SUCH AS READING THE NEWSPAPER OR WATCHING TELEVISION: NOT AT ALL
SUM OF ALL RESPONSES TO PHQ QUESTIONS 1-9: 9
SUM OF ALL RESPONSES TO PHQ QUESTIONS 1-9: 8
8. MOVING OR SPEAKING SO SLOWLY THAT OTHER PEOPLE COULD HAVE NOTICED. OR THE OPPOSITE, BEING SO FIGETY OR RESTLESS THAT YOU HAVE BEEN MOVING AROUND A LOT MORE THAN USUAL: NOT AT ALL
1. LITTLE INTEREST OR PLEASURE IN DOING THINGS: MORE THAN HALF THE DAYS
6. FEELING BAD ABOUT YOURSELF - OR THAT YOU ARE A FAILURE OR HAVE LET YOURSELF OR YOUR FAMILY DOWN: SEVERAL DAYS
SUM OF ALL RESPONSES TO PHQ QUESTIONS 1-9: 9
5. POOR APPETITE OR OVEREATING: NOT AT ALL
9. THOUGHTS THAT YOU WOULD BE BETTER OFF DEAD, OR OF HURTING YOURSELF: SEVERAL DAYS

## 2025-06-19 ASSESSMENT — ANXIETY QUESTIONNAIRES
4. TROUBLE RELAXING: SEVERAL DAYS
7. FEELING AFRAID AS IF SOMETHING AWFUL MIGHT HAPPEN: SEVERAL DAYS
6. BECOMING EASILY ANNOYED OR IRRITABLE: SEVERAL DAYS
IF YOU CHECKED OFF ANY PROBLEMS ON THIS QUESTIONNAIRE, HOW DIFFICULT HAVE THESE PROBLEMS MADE IT FOR YOU TO DO YOUR WORK, TAKE CARE OF THINGS AT HOME, OR GET ALONG WITH OTHER PEOPLE: NOT DIFFICULT AT ALL
3. WORRYING TOO MUCH ABOUT DIFFERENT THINGS: NEARLY EVERY DAY
5. BEING SO RESTLESS THAT IT IS HARD TO SIT STILL: NOT AT ALL
2. NOT BEING ABLE TO STOP OR CONTROL WORRYING: SEVERAL DAYS
1. FEELING NERVOUS, ANXIOUS, OR ON EDGE: MORE THAN HALF THE DAYS
GAD7 TOTAL SCORE: 9

## 2025-06-19 ASSESSMENT — COLUMBIA-SUICIDE SEVERITY RATING SCALE - C-SSRS
2. HAVE YOU ACTUALLY HAD ANY THOUGHTS OF KILLING YOURSELF?: NO
6. HAVE YOU EVER DONE ANYTHING, STARTED TO DO ANYTHING, OR PREPARED TO DO ANYTHING TO END YOUR LIFE?: NO
1. WITHIN THE PAST MONTH, HAVE YOU WISHED YOU WERE DEAD OR WISHED YOU COULD GO TO SLEEP AND NOT WAKE UP?: YES

## 2025-06-19 NOTE — PROGRESS NOTES
Joanie De La Garza   YOB: 1980  Date of Visit:  6/19/2025  Assessment/Plan   1. Primary hypertension  Assessment & Plan:  Start Losartan 50 mg daily  Lifestyle changes: discussed calorie tracking, activity as tolerated  Recheck BP in a week  Orders:  -     losartan (COZAAR) 50 MG tablet; Take 1 tablet by mouth daily, Disp-30 tablet, R-1Normal  2. Moderate episode of recurrent major depressive disorder (HCC)  Assessment & Plan:  Reviewed, and indicates moderate depression.  -No active suicidal ideation; strong support system in place. Lives with parents and her son  - Increase Paxil to 20 mg daily. F/up in a month  3. Generalized anxiety disorder  -     busPIRone (BUSPAR) 10 MG tablet; Take 1 tablet by mouth 2 times daily, Disp-60 tablet, R-0Normal  4. Obesity, morbid, BMI 50 or higher (HCC)  Assessment & Plan:  - BMI is over 30, meets criteria for Adipex  - No absolute contraindications (glaucoma, drug abuse, CAD, uncontrolled HTN, arrhythmias, stroke, CHF, hyperthyroidism, MAOI use, pregnant or breastfeeding) to being on medication.  - Patient is aware that in order to be successful, must combine Adipex with appropriate diet and exercise plan.   - Patient aware must continue to meet monthly in person while on this medication. F/u in one month.  -Checked PDMP. Any concerns: NONE   Orders:  -     CBC with Auto Differential; Future  -     Comprehensive Metabolic Panel; Future  -     TSH reflex to FT4; Future  -     Lipid, Fasting; Future  -     Hemoglobin A1C; Future  -     phentermine (ADIPEX-P) 37.5 MG tablet; Take 1 tablet by mouth every morning (before breakfast) for 30 days. Max Daily Amount: 37.5 mg, Disp-30 tablet, R-0Normal  5. Thyromegaly  -     US HEAD NECK SOFT TISSUE; Future  6. Perimenopausal symptoms  Comments:  Likely contributing to mood changes, follows with GYN  7. Chronic pain of left knee  Assessment & Plan:  Pain likely mechanical, worsened by occupational strain and

## 2025-06-20 ENCOUNTER — HOSPITAL ENCOUNTER (OUTPATIENT)
Age: 45
Discharge: HOME | End: 2025-06-20
Payer: COMMERCIAL

## 2025-06-20 DIAGNOSIS — E66.01 OBESITY, MORBID, BMI 50 OR HIGHER (HCC): ICD-10-CM

## 2025-06-20 LAB
ALBUMIN SERPL-MCNC: 3.9 G/DL (ref 3.4–5)
ALBUMIN/GLOB SERPL: 1.5 {RATIO} (ref 1.1–2.2)
ALP SERPL-CCNC: 80 U/L (ref 40–129)
ALT SERPL-CCNC: 15 U/L (ref 10–40)
ANION GAP SERPL CALCULATED.3IONS-SCNC: 9 MMOL/L (ref 9–17)
AST SERPL-CCNC: 14 U/L (ref 15–37)
BASOPHILS # BLD: 0.07 K/UL
BASOPHILS NFR BLD: 1 % (ref 0–1)
BILIRUB SERPL-MCNC: 0.5 MG/DL (ref 0–1)
BUN SERPL-MCNC: 9 MG/DL (ref 7–20)
CALCIUM SERPL-MCNC: 9 MG/DL (ref 8.3–10.6)
CHLORIDE SERPL-SCNC: 103 MMOL/L (ref 99–110)
CHOLEST SERPL-MCNC: 165 MG/DL (ref 125–199)
CO2 SERPL-SCNC: 26 MMOL/L (ref 21–32)
CREAT SERPL-MCNC: 0.6 MG/DL (ref 0.6–1.1)
EOSINOPHIL # BLD: 0.09 K/UL
EOSINOPHILS RELATIVE PERCENT: 1 % (ref 0–3)
ERYTHROCYTE [DISTWIDTH] IN BLOOD BY AUTOMATED COUNT: 12.7 % (ref 11.7–14.9)
EST. AVERAGE GLUCOSE BLD GHB EST-MCNC: 109 MG/DL
GFR, ESTIMATED: >90 ML/MIN/1.73M2
GLUCOSE SERPL-MCNC: 91 MG/DL (ref 74–99)
HBA1C MFR BLD: 5.4 % (ref 4.2–6.3)
HCT VFR BLD AUTO: 41.4 % (ref 37–47)
HDLC SERPL-MCNC: 52 MG/DL
HGB BLD-MCNC: 12.9 G/DL (ref 12.5–16)
IMM GRANULOCYTES # BLD AUTO: 0.03 K/UL
IMM GRANULOCYTES NFR BLD: 0 %
LDLC SERPL CALC-MCNC: 87 MG/DL
LYMPHOCYTES NFR BLD: 1.9 K/UL
LYMPHOCYTES RELATIVE PERCENT: 25 % (ref 24–44)
MCH RBC QN AUTO: 27.7 PG (ref 27–31)
MCHC RBC AUTO-ENTMCNC: 31.2 G/DL (ref 32–36)
MCV RBC AUTO: 89 FL (ref 78–100)
MONOCYTES NFR BLD: 0.53 K/UL
MONOCYTES NFR BLD: 7 % (ref 0–5)
NEUTROPHILS NFR BLD: 66 % (ref 36–66)
NEUTS SEG NFR BLD: 5.12 K/UL
PLATELET # BLD AUTO: 234 K/UL (ref 140–440)
PMV BLD AUTO: 9.5 FL (ref 7.5–11.1)
POTASSIUM SERPL-SCNC: 4.2 MMOL/L (ref 3.5–5.1)
PROT SERPL-MCNC: 6.6 G/DL (ref 6.4–8.2)
RBC # BLD AUTO: 4.65 M/UL (ref 4.2–5.4)
SODIUM SERPL-SCNC: 138 MMOL/L (ref 136–145)
TRIGL SERPL-MCNC: 129 MG/DL
TSH SERPL DL<=0.05 MIU/L-ACNC: 2.09 UIU/ML (ref 0.27–4.2)
WBC OTHER # BLD: 7.7 K/UL (ref 4–10.5)

## 2025-06-20 PROCEDURE — 80053 COMPREHEN METABOLIC PANEL: CPT

## 2025-06-20 PROCEDURE — 84443 ASSAY THYROID STIM HORMONE: CPT

## 2025-06-20 PROCEDURE — 80061 LIPID PANEL: CPT

## 2025-06-20 PROCEDURE — 83036 HEMOGLOBIN GLYCOSYLATED A1C: CPT

## 2025-06-20 PROCEDURE — 85025 COMPLETE CBC W/AUTO DIFF WBC: CPT

## 2025-06-22 ENCOUNTER — RESULTS FOLLOW-UP (OUTPATIENT)
Dept: FAMILY MEDICINE CLINIC | Age: 45
End: 2025-06-22

## 2025-06-22 PROBLEM — R10.2 CHRONIC PELVIC PAIN IN FEMALE: Status: ACTIVE | Noted: 2025-06-22

## 2025-06-22 PROBLEM — F33.1 MODERATE EPISODE OF RECURRENT MAJOR DEPRESSIVE DISORDER (HCC): Status: ACTIVE | Noted: 2025-06-22

## 2025-06-22 PROBLEM — F41.1 GENERALIZED ANXIETY DISORDER: Status: ACTIVE | Noted: 2025-06-22

## 2025-06-22 PROBLEM — K80.10 CALCULUS OF GALLBLADDER WITH CHRONIC CHOLECYSTITIS WITHOUT OBSTRUCTION: Status: RESOLVED | Noted: 2022-07-18 | Resolved: 2025-06-22

## 2025-06-22 PROBLEM — G89.29 CHRONIC PELVIC PAIN IN FEMALE: Status: ACTIVE | Noted: 2025-06-22

## 2025-06-22 PROBLEM — I10 PRIMARY HYPERTENSION: Status: ACTIVE | Noted: 2025-06-22

## 2025-06-22 PROBLEM — Z96.652 PRESENCE OF LEFT ARTIFICIAL KNEE JOINT: Status: ACTIVE | Noted: 2024-06-28

## 2025-06-22 RX ORDER — PAROXETINE 10 MG/1
20 TABLET, FILM COATED ORAL DAILY
COMMUNITY
Start: 2025-06-05

## 2025-06-24 ENCOUNTER — TELEPHONE (OUTPATIENT)
Dept: FAMILY MEDICINE CLINIC | Age: 45
End: 2025-06-24

## 2025-06-24 NOTE — TELEPHONE ENCOUNTER
LM for patient to return call, patient letter is ready to be picked up, she has an nurse appointment on 6/25/2025 she is more than welcome to  her letter than.

## 2025-06-25 ENCOUNTER — CLINICAL SUPPORT (OUTPATIENT)
Dept: FAMILY MEDICINE CLINIC | Age: 45
End: 2025-06-25

## 2025-06-25 ENCOUNTER — HOSPITAL ENCOUNTER (OUTPATIENT)
Dept: ULTRASOUND IMAGING | Age: 45
Discharge: HOME | End: 2025-06-25
Attending: STUDENT IN AN ORGANIZED HEALTH CARE EDUCATION/TRAINING PROGRAM
Payer: COMMERCIAL

## 2025-06-25 VITALS — DIASTOLIC BLOOD PRESSURE: 72 MMHG | SYSTOLIC BLOOD PRESSURE: 132 MMHG

## 2025-06-25 DIAGNOSIS — E01.0 THYROMEGALY: ICD-10-CM

## 2025-06-25 DIAGNOSIS — E04.1 RIGHT THYROID NODULE: Primary | ICD-10-CM

## 2025-06-25 PROCEDURE — 76536 US EXAM OF HEAD AND NECK: CPT

## 2025-07-03 NOTE — PROGRESS NOTES
7/3/25 - LM with rescheduled procedure - 7/11/25 at 0900, arrival 0800. Please call with any questions.  7/1/25 - LM with my call-back #, times and instructions:    IR Procedure at [facility]:  7/9/25 @ 1100, arrival 1000        Follow your directions as prescribed by the doctor for your procedure and medications.  3.   Consult your provider as to when to stop blood thinner - no blood thinners on medication list  4.   Do not take any pain medication within 6 hours of your procedure  5.   Do not drink any alcoholic beverages or use any street drugs 24 hours before procedure.  6.   Please wear simple, loose fitting clothing to the hospital.  Do not bring valuables (money,             credit cards, checkbooks, etc.)     7.   If you  have a Living Will and Durable Power of  for Healthcare, please bring in a copy.  8.   Please bring picture ID,  insurance card, paperwork from the doctors office            (H & P, Consent,  & card for implantable devices).  9.   Report to the information desk on the ground floor.  10. Take a shower the night before or morning of your procedure, do not apply any lotion, oil or powder.  11.

## 2025-07-11 ENCOUNTER — HOSPITAL ENCOUNTER (OUTPATIENT)
Dept: INTERVENTIONAL RADIOLOGY/VASCULAR | Age: 45
Discharge: HOME | End: 2025-07-11
Payer: COMMERCIAL

## 2025-07-11 VITALS — OXYGEN SATURATION: 98 % | DIASTOLIC BLOOD PRESSURE: 99 MMHG | SYSTOLIC BLOOD PRESSURE: 149 MMHG | HEART RATE: 75 BPM

## 2025-07-11 DIAGNOSIS — E04.1 RIGHT THYROID NODULE: ICD-10-CM

## 2025-07-11 PROCEDURE — 6360000002 HC RX W HCPCS: Performed by: RADIOLOGY

## 2025-07-11 PROCEDURE — 2709999900 IR BIOPSY THYROID PERC CORE NEEDLE

## 2025-07-11 PROCEDURE — 10005 FNA BX W/US GDN 1ST LES: CPT

## 2025-07-11 RX ORDER — LIDOCAINE HYDROCHLORIDE 10 MG/ML
INJECTION, SOLUTION EPIDURAL; INFILTRATION; INTRACAUDAL; PERINEURAL PRN
Status: COMPLETED | OUTPATIENT
Start: 2025-07-11 | End: 2025-07-11

## 2025-07-11 RX ADMIN — LIDOCAINE HYDROCHLORIDE 6 ML: 10 INJECTION, SOLUTION EPIDURAL; INFILTRATION; INTRACAUDAL; PERINEURAL at 08:33

## 2025-07-11 NOTE — PROGRESS NOTES
Pt returned after procedure. VSS. Discharge instruction given to patient and mother. All questions answered at this time. Pt ambulated to exit.

## 2025-07-11 NOTE — BRIEF OP NOTE
Department of Interventional Radiology Post-Procedure Note      Date: 7/11/2025     Interventional Radiologist: Alexander    Procedure Performed:  right thyroid FNA    H&P Status: H&P was reviewed, the patient was examined and no change has occurred in patient's condition since H&P was completed.    Pre-procedure Diagnosis:  right thyroid nodule    Post-procedure Diagnosis:  SAme    Sedation:  none    Contrast used:  none    Estimated blood loss:  Minimal    Preliminary Findings:  Successful    Complications:  none    Full Report to Follow.    Electronically signed by Fifi Munoz MD on 7/11/2025 at 11:33 AM

## 2025-07-11 NOTE — PROGRESS NOTES
TRANSFER - OUT REPORT:    Verbal report given to liza(name) on Joanie De La Garza being transferred to ir holding(unit) for routine post-op       Report consisted of patient's Situation, Background, Assessment and   Recommendations(SBAR).     Information from the following report(s) Nurse Handoff Report was reviewed with the receiving nurse.    Opportunity for questions and clarification was provided.      Patient transported with:   Registered Nurse

## 2025-07-11 NOTE — CONSULTS
Consult Interventional Radiology    Date:2025  Name:Joanie De La Garza   :1980   MR#:[id number]    SEX:female     Planned procedure:  right thyroid FNA  Indication: right thyroid nodule    H&P Status: H&P was reviewed, the patient was examined and no change has occurred in patient's condition since H&P was completed.    Past Medical History:  Past Medical History:   Diagnosis Date    Kidney stones     last stone:    Obesity     Primary hypertension 2025    Primary osteoarthritis of left knee 2023    UTI (urinary tract infection)         Past Surgical History:  Past Surgical History:   Procedure Laterality Date    APPENDECTOMY  06/10/2020    BLADDER SURGERY N/A 08/15/2021    CYSTOSCOPY RETROGRADE PYELOGRAM STENT INSERTION performed by Josefina Mann MD at [facility]    BLADDER SURGERY Right 2021    RIGHT CYSTOSCOPY STENT REMOVAL performed by Josefina Mann MD at [facility]     SECTION      CHOLECYSTECTOMY      CT URETERAL STENT PLACEMENT THRU EXIST TRACT      CYSTOSCOPY Right 10/01/2021    RIGHT CYSTOSCOPY, RIGHT URETEROSCOPY STENT EXCHANGE, RETROGRADE PYELOGRAMS, LITHOTRIPSY, STONE EXTRACTION performed by Josefina Mann MD at [facility]    HYSTERECTOMY (CERVIX STATUS UNKNOWN)  06/10/2020    KNEE ARTHROSCOPY Left 2023    LEFT KNEE ARTHROSCOPY WITH PARTIAL MEDIAL MENISCECTOMY performed by Jhonny Crawford DO at [facility]       Social History:  Social History     Socioeconomic History    Marital status: Life Partner     Spouse name: Not on file    Number of children: Not on file    Years of education: Not on file    Highest education level: Not on file   Occupational History    Not on file   Tobacco Use    Smoking status: Never    Smokeless tobacco: Never   Vaping Use    Vaping status: Never Used   Substance and Sexual Activity    Alcohol use: No    Drug use: No    Sexual activity: Yes     Partners: Male     Comment: \"I am but we don't\"   Other Topics Concern

## 2025-07-13 DIAGNOSIS — F41.1 GENERALIZED ANXIETY DISORDER: ICD-10-CM

## 2025-07-14 RX ORDER — BUSPIRONE HYDROCHLORIDE 10 MG/1
10 TABLET ORAL 2 TIMES DAILY
Qty: 180 TABLET | Refills: 1 | Status: SHIPPED | OUTPATIENT
Start: 2025-07-14 | End: 2025-07-15 | Stop reason: SDUPTHER

## 2025-07-15 ENCOUNTER — OFFICE VISIT (OUTPATIENT)
Dept: FAMILY MEDICINE CLINIC | Age: 45
End: 2025-07-15
Payer: COMMERCIAL

## 2025-07-15 VITALS
HEIGHT: 67 IN | OXYGEN SATURATION: 98 % | WEIGHT: 293 LBS | SYSTOLIC BLOOD PRESSURE: 124 MMHG | BODY MASS INDEX: 45.99 KG/M2 | HEART RATE: 89 BPM | DIASTOLIC BLOOD PRESSURE: 80 MMHG

## 2025-07-15 DIAGNOSIS — F33.1 MODERATE EPISODE OF RECURRENT MAJOR DEPRESSIVE DISORDER (HCC): Primary | ICD-10-CM

## 2025-07-15 DIAGNOSIS — F41.1 GENERALIZED ANXIETY DISORDER: ICD-10-CM

## 2025-07-15 DIAGNOSIS — E66.01 OBESITY, MORBID, BMI 50 OR HIGHER (HCC): ICD-10-CM

## 2025-07-15 DIAGNOSIS — I10 PRIMARY HYPERTENSION: ICD-10-CM

## 2025-07-15 LAB — NON-GYN CYTOLOGY REPORT: NORMAL

## 2025-07-15 PROCEDURE — G8427 DOCREV CUR MEDS BY ELIG CLIN: HCPCS | Performed by: STUDENT IN AN ORGANIZED HEALTH CARE EDUCATION/TRAINING PROGRAM

## 2025-07-15 PROCEDURE — G8417 CALC BMI ABV UP PARAM F/U: HCPCS | Performed by: STUDENT IN AN ORGANIZED HEALTH CARE EDUCATION/TRAINING PROGRAM

## 2025-07-15 PROCEDURE — 99214 OFFICE O/P EST MOD 30 MIN: CPT | Performed by: STUDENT IN AN ORGANIZED HEALTH CARE EDUCATION/TRAINING PROGRAM

## 2025-07-15 PROCEDURE — 3074F SYST BP LT 130 MM HG: CPT | Performed by: STUDENT IN AN ORGANIZED HEALTH CARE EDUCATION/TRAINING PROGRAM

## 2025-07-15 PROCEDURE — 3079F DIAST BP 80-89 MM HG: CPT | Performed by: STUDENT IN AN ORGANIZED HEALTH CARE EDUCATION/TRAINING PROGRAM

## 2025-07-15 PROCEDURE — 1036F TOBACCO NON-USER: CPT | Performed by: STUDENT IN AN ORGANIZED HEALTH CARE EDUCATION/TRAINING PROGRAM

## 2025-07-15 RX ORDER — BUSPIRONE HYDROCHLORIDE 10 MG/1
10 TABLET ORAL 2 TIMES DAILY
Qty: 180 TABLET | Refills: 1 | Status: SHIPPED | OUTPATIENT
Start: 2025-07-15

## 2025-07-15 NOTE — ASSESSMENT & PLAN NOTE
Unfortunately due to cost barriers, cannot continue on Adipex  Calorie counting and exercise as tolerated

## 2025-07-15 NOTE — PROGRESS NOTES
Joanie De La Garza   YOB: 1980  Date of Visit:  7/15/2025  Assessment/Plan   1. Moderate episode of recurrent major depressive disorder (HCC)  Assessment & Plan:  Improving, continue Paxil   2. Generalized anxiety disorder  Assessment & Plan:   Chronic, at goal (stable), continue current treatment plan and medication adherence emphasized  Orders:  -     busPIRone (BUSPAR) 10 MG tablet; Take 1 tablet by mouth 2 times daily, Disp-180 tablet, R-1Normal  3. Obesity, morbid, BMI 50 or higher (HCC)  Assessment & Plan:  Unfortunately due to cost barriers, cannot continue on Adipex  Calorie counting and exercise as tolerated   4. Primary hypertension  Assessment & Plan:   Chronic, at goal (stable), continue current treatment plan, medication adherence emphasized, and lifestyle modifications recommended    Medications reviewed with the patient.  Appropriate prescriptions are addressed. After visit summery provided.  Follow up as directed, sooner if needed.  Questions answered and patient verbalizes understanding. Call for any problems, questions, or concerns.     Return in about 3 months (around 10/15/2025) for HTN, depression(needs PHQ9 and REMEDIOS).      SUBJECTIVE:    Following up today for weight management, anxiety and depression.  At her last visit, she was started on Adipex which she tolerated for a month, had 3 pounds weight loss but unfortunately not covered by insurance and currently cannot afford to pay out of pocket at the moment.    Depression/Anxiety: meds are working well and tolerating them. She reports no side effects today and I will refill buspar.    HTN: her BP is at goal on losartan 50 mg  She is s/p thyroid biopsy for Right thyroid nodule, awaiting pathology report.  She's concerned about the pain around her left knee where she had partial replacement, I have asked she follows with her orthopedic surgeon for further evaluation    Review of Systems : A 12 point review of symptoms was

## 2025-07-28 ENCOUNTER — TELEPHONE (OUTPATIENT)
Dept: FAMILY MEDICINE CLINIC | Age: 45
End: 2025-07-28

## 2025-07-28 DIAGNOSIS — F33.1 MODERATE EPISODE OF RECURRENT MAJOR DEPRESSIVE DISORDER (HCC): ICD-10-CM

## 2025-07-28 RX ORDER — PAROXETINE 10 MG/1
20 TABLET, FILM COATED ORAL DAILY
Qty: 180 TABLET | Refills: 1 | Status: SHIPPED | OUTPATIENT
Start: 2025-07-28

## 2025-07-28 NOTE — TELEPHONE ENCOUNTER
Patient called stating she is needing a refill of her Paroxetine 10 mg. She stated she take it 2 times daily. There are no refills at her pharmacy. Please advise   Thank you

## 2025-07-29 ENCOUNTER — OFFICE VISIT (OUTPATIENT)
Dept: FAMILY MEDICINE CLINIC | Age: 45
End: 2025-07-29
Payer: COMMERCIAL

## 2025-07-29 VITALS
OXYGEN SATURATION: 96 % | SYSTOLIC BLOOD PRESSURE: 124 MMHG | HEART RATE: 84 BPM | BODY MASS INDEX: 55.91 KG/M2 | DIASTOLIC BLOOD PRESSURE: 88 MMHG | WEIGHT: 293 LBS

## 2025-07-29 DIAGNOSIS — T24.232A PARTIAL THICKNESS BURN OF LEFT LOWER LEG, INITIAL ENCOUNTER: Primary | ICD-10-CM

## 2025-07-29 PROCEDURE — 3074F SYST BP LT 130 MM HG: CPT | Performed by: STUDENT IN AN ORGANIZED HEALTH CARE EDUCATION/TRAINING PROGRAM

## 2025-07-29 PROCEDURE — G8417 CALC BMI ABV UP PARAM F/U: HCPCS | Performed by: STUDENT IN AN ORGANIZED HEALTH CARE EDUCATION/TRAINING PROGRAM

## 2025-07-29 PROCEDURE — 1036F TOBACCO NON-USER: CPT | Performed by: STUDENT IN AN ORGANIZED HEALTH CARE EDUCATION/TRAINING PROGRAM

## 2025-07-29 PROCEDURE — 3079F DIAST BP 80-89 MM HG: CPT | Performed by: STUDENT IN AN ORGANIZED HEALTH CARE EDUCATION/TRAINING PROGRAM

## 2025-07-29 PROCEDURE — 99213 OFFICE O/P EST LOW 20 MIN: CPT | Performed by: STUDENT IN AN ORGANIZED HEALTH CARE EDUCATION/TRAINING PROGRAM

## 2025-07-29 PROCEDURE — G8427 DOCREV CUR MEDS BY ELIG CLIN: HCPCS | Performed by: STUDENT IN AN ORGANIZED HEALTH CARE EDUCATION/TRAINING PROGRAM

## 2025-07-29 RX ORDER — SILVER SULFADIAZINE 10 MG/G
CREAM TOPICAL
Qty: 85 G | Refills: 1 | Status: SHIPPED | OUTPATIENT
Start: 2025-07-29

## 2025-07-29 RX ORDER — LIDOCAINE 50 MG/G
OINTMENT TOPICAL
Qty: 50 G | Refills: 0 | Status: SHIPPED | OUTPATIENT
Start: 2025-07-29 | End: 2025-07-29

## 2025-07-29 RX ORDER — LIDOCAINE 50 MG/G
5 OINTMENT TOPICAL 2 TIMES DAILY
Qty: 50 G | Refills: 0 | Status: SHIPPED | OUTPATIENT
Start: 2025-07-29

## 2025-07-29 RX ORDER — MUPIROCIN 2 %
OINTMENT (GRAM) TOPICAL
Qty: 22 G | Refills: 1 | Status: SHIPPED | OUTPATIENT
Start: 2025-07-29 | End: 2025-08-05
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
## 2025-08-06 DIAGNOSIS — I10 PRIMARY HYPERTENSION: ICD-10-CM

## 2025-08-06 RX ORDER — LOSARTAN POTASSIUM 50 MG/1
50 TABLET ORAL DAILY
Qty: 90 TABLET | Refills: 1 | Status: SHIPPED | OUTPATIENT
Start: 2025-08-06

## 2025-08-27 DIAGNOSIS — I10 PRIMARY HYPERTENSION: Primary | ICD-10-CM

## 2025-08-27 RX ORDER — BLOOD PRESSURE TEST KIT
1 KIT MISCELLANEOUS DAILY
Qty: 1 KIT | Refills: 0 | Status: SHIPPED | OUTPATIENT
Start: 2025-08-27

## 2025-08-28 ENCOUNTER — RESULTS FOLLOW-UP (OUTPATIENT)
Age: 45
End: 2025-08-28

## (undated) DEVICE — KNEE POSITIONING KIT: Brand: DEVON

## (undated) DEVICE — SPONGE LAP W18XL18IN WHT COT 4 PLY FLD STRUNG RADPQ DISP ST

## (undated) DEVICE — GLOVE SURG SZ 65 CRM LTX FREE POLYISOPRENE POLYMER BEAD ANTI

## (undated) DEVICE — TUBING, SUCTION, 9/32" X 20', STRAIGHT: Brand: MEDLINE INDUSTRIES, INC.

## (undated) DEVICE — GUIDEWIRE ENDOSCP L150CM DIA0.035IN TIP 3CM PTFE NIT

## (undated) DEVICE — TOWEL,OR,DSP,ST,BLUE,STD,6/PK,12PK/CS: Brand: MEDLINE

## (undated) DEVICE — SYRINGE 20ML LL S/C 50

## (undated) DEVICE — WEREWOLF FLOW 50 COBLATION WAND: Brand: COBLATION

## (undated) DEVICE — SHEET,DRAPE,53X77,STERILE: Brand: MEDLINE

## (undated) DEVICE — GLOVE SURG SZ 7 CRM LTX FREE POLYISOPRENE POLYMER BEAD ANTI

## (undated) DEVICE — TUBING PMP L16FT MAIN DISP FOR AR-6400 AR-6475

## (undated) DEVICE — SINGLE PORT MANIFOLD: Brand: NEPTUNE 2

## (undated) DEVICE — MARKER SURG SKIN UTIL REGULAR/FINE 2 TIP W/ RUL AND 9 LBL

## (undated) DEVICE — GLOVE SURG SZ 65 THK91MIL LTX FREE SYN POLYISOPRENE

## (undated) DEVICE — TUBING, SUCTION, 9/32" X 10', STRAIGHT: Brand: MEDLINE

## (undated) DEVICE — GOWN,SIRUS,POLYRNF,BRTHSLV,XLN/XL,20/CS: Brand: MEDLINE

## (undated) DEVICE — ARTHROSCOPY PACK: Brand: MEDLINE INDUSTRIES, INC.

## (undated) DEVICE — SUTURE ETHLN SZ 3-0 L18IN NONABSORBABLE BLK FS-1 L24MM 3/8 663H

## (undated) DEVICE — COUNTER NDL 30 COUNT FOAM STRP SGL MAG

## (undated) DEVICE — [AGGRESSIVE PLUS CUTTER, ARTHROSCOPIC SHAVER BLADE,  DO NOT RESTERILIZE,  DO NOT USE IF PACKAGE IS DAMAGED,  KEEP DRY,  KEEP AWAY FROM SUNLIGHT]: Brand: FORMULA

## (undated) DEVICE — OPEN-END FLEXI-TIP URETERAL CATHETER: Brand: FLEXI-TIP

## (undated) DEVICE — BANDAGE,ELASTIC,ESMARK,STERILE,6"X9',LF: Brand: MEDLINE

## (undated) DEVICE — SYRINGE MED 30ML STD CLR PLAS LUERLOCK TIP N CTRL DISP

## (undated) DEVICE — GLOVE SURG SZ 8 CRM LTX FREE POLYISOPRENE POLYMER BEAD ANTI

## (undated) DEVICE — SYRINGE MED 20ML STD CLR PLAS LUERLOCK TIP N CTRL DISP

## (undated) DEVICE — TRAY PREP DRY W/ PREM GLV 2 APPL 6 SPNG 2 UNDPD 1 OVERWRAP

## (undated) DEVICE — SOLUTION IRRIG 3000ML STRL H2O USP UROMATIC PLAS CONT

## (undated) DEVICE — GUIDEWIRE UROLOGICAL STR 3 CM 0.038 INX150 CM NIT SENSOR

## (undated) DEVICE — LEGGINGS, PAIR, CLEAR, STERILE: Brand: MEDLINE

## (undated) DEVICE — UROLOGIC DRAIN BAG: Brand: UNBRANDED

## (undated) DEVICE — PADDING CAST W6INXL4YD COT COHESIVE HND TEARABLE SPEC 100

## (undated) DEVICE — SYRINGE MED 10ML LUERLOCK TIP W/O SFTY DISP

## (undated) DEVICE — GLOVE ORANGE PI 8   MSG9080

## (undated) DEVICE — 3M™ STERI-DRAPE™ U-DRAPE 1015: Brand: STERI-DRAPE™

## (undated) DEVICE — YANKAUER,FLEXIBLE HANDLE,REGLR CAPACITY: Brand: MEDLINE INDUSTRIES, INC.

## (undated) DEVICE — GLOVE SURG SZ 75 L12IN FNGR THK79MIL GRN LTX FREE

## (undated) DEVICE — GOWN,ECLIPSE,POLYRNF,BRTHSLV,L,30/CS: Brand: MEDLINE

## (undated) DEVICE — JELLY,LUBE,STERILE,FLIP TOP,TUBE,2-OZ: Brand: MEDLINE

## (undated) DEVICE — CATHETER URET 5FR L70CM POLYUR CONE FLX TIP KINK RESIST W/

## (undated) DEVICE — GLOVE SURG SZ 7 L12IN FNGR THK79MIL GRN LTX FREE

## (undated) DEVICE — SET IRRIG L94IN ID0.281IN W/ 4.5IN DST FLX CONN 2 LD ON OFF

## (undated) DEVICE — CATHETER URET 5FR L70CM TIP 8FR OPN END CONE TIP INJ HUB

## (undated) DEVICE — PENCIL ES CRD L10FT HND SWCHING ROCK SWCH W/ EDGE COAT BLDE

## (undated) DEVICE — GLOVE ORANGE PI 7 1/2   MSG9075

## (undated) DEVICE — POSITIONER ARTHSCP KNEE HLDR WHT W/O CVR

## (undated) DEVICE — COVER,MAYO STAND,STERILE: Brand: MEDLINE

## (undated) DEVICE — DRAINBAG,ANTI-REFLUX TOWER,L/F,2000ML,LL: Brand: MEDLINE

## (undated) DEVICE — MAT FLOOR ULTRA ABS 28X48IN

## (undated) DEVICE — DBD-PACK,CYSTOSCOPY,PK VI,AURORA: Brand: MEDLINE

## (undated) DEVICE — GLOVE SURG SZ 65 L12IN FNGR THK79MIL GRN LTX FREE

## (undated) DEVICE — CONTAINER,SPECIMEN,OR STERILE,4OZ: Brand: MEDLINE

## (undated) DEVICE — GOWN,ECLIPSE,POLYRNF,BRTHSLV,XL,30/CS: Brand: MEDLINE

## (undated) DEVICE — APPLICATOR MEDICATED 26 CC SOLUTION HI LT ORNG CHLORAPREP

## (undated) DEVICE — GLOVE SURG SZ 6 THK91MIL LTX FREE SYN POLYISOPRENE ANTI

## (undated) DEVICE — NITINOL STONE RETRIEVAL BASKET: Brand: ZERO TIP

## (undated) DEVICE — GLOVE SURG SZ 8 L12IN THK75MIL DK GRN LTX FREE